# Patient Record
Sex: FEMALE | Race: WHITE | NOT HISPANIC OR LATINO | Employment: OTHER | URBAN - NONMETROPOLITAN AREA
[De-identification: names, ages, dates, MRNs, and addresses within clinical notes are randomized per-mention and may not be internally consistent; named-entity substitution may affect disease eponyms.]

---

## 2017-02-07 PROCEDURE — 87070 CULTURE OTHR SPECIMN AEROBIC: CPT | Performed by: FAMILY MEDICINE

## 2017-12-22 ENCOUNTER — APPOINTMENT (OUTPATIENT)
Dept: GENERAL RADIOLOGY | Facility: HOSPITAL | Age: 32
End: 2017-12-22

## 2017-12-22 PROCEDURE — 71020 HC CHEST PA AND LATERAL: CPT

## 2018-01-18 ENCOUNTER — APPOINTMENT (OUTPATIENT)
Dept: GENERAL RADIOLOGY | Facility: HOSPITAL | Age: 33
End: 2018-01-18

## 2018-03-12 ENCOUNTER — APPOINTMENT (OUTPATIENT)
Dept: LAB | Facility: HOSPITAL | Age: 33
End: 2018-03-12
Attending: OBSTETRICS & GYNECOLOGY

## 2018-03-12 ENCOUNTER — TRANSCRIBE ORDERS (OUTPATIENT)
Dept: ADMINISTRATIVE | Facility: HOSPITAL | Age: 33
End: 2018-03-12

## 2018-03-12 DIAGNOSIS — E03.9 ACQUIRED HYPOTHYROIDISM: ICD-10-CM

## 2018-03-12 DIAGNOSIS — Z13.1 SCREENING FOR DIABETES MELLITUS: ICD-10-CM

## 2018-03-12 DIAGNOSIS — Z13.220 SCREENING FOR LIPOID DISORDERS: ICD-10-CM

## 2018-03-12 DIAGNOSIS — Z13.0 SCREENING FOR IRON DEFICIENCY ANEMIA: Primary | ICD-10-CM

## 2018-03-12 LAB
ALBUMIN SERPL-MCNC: 4.4 G/DL (ref 3.5–5)
ALBUMIN/GLOB SERPL: 1.3 G/DL (ref 1.1–2.5)
ALP SERPL-CCNC: 56 U/L (ref 24–120)
ALT SERPL W P-5'-P-CCNC: 26 U/L (ref 0–54)
ANION GAP SERPL CALCULATED.3IONS-SCNC: 12 MMOL/L (ref 4–13)
ARTICHOKE IGE QN: 88 MG/DL (ref 0–99)
AST SERPL-CCNC: 24 U/L (ref 7–45)
BILIRUB SERPL-MCNC: 0.3 MG/DL (ref 0.1–1)
BUN BLD-MCNC: 8 MG/DL (ref 5–21)
BUN/CREAT SERPL: 11 (ref 7–25)
CALCIUM SPEC-SCNC: 9.6 MG/DL (ref 8.4–10.4)
CHLORIDE SERPL-SCNC: 100 MMOL/L (ref 98–110)
CHOLEST SERPL-MCNC: 188 MG/DL (ref 130–200)
CO2 SERPL-SCNC: 31 MMOL/L (ref 24–31)
CREAT BLD-MCNC: 0.73 MG/DL (ref 0.5–1.4)
GFR SERPL CREATININE-BSD FRML MDRD: 91 ML/MIN/1.73
GLOBULIN UR ELPH-MCNC: 3.4 GM/DL
GLUCOSE BLD-MCNC: 99 MG/DL (ref 70–100)
HDLC SERPL-MCNC: 73 MG/DL
HGB BLD-MCNC: 13.7 G/DL (ref 12–16)
LDLC/HDLC SERPL: 1.33 {RATIO}
POTASSIUM BLD-SCNC: 4.3 MMOL/L (ref 3.5–5.3)
PROT SERPL-MCNC: 7.8 G/DL (ref 6.3–8.7)
SODIUM BLD-SCNC: 143 MMOL/L (ref 135–145)
T4 FREE SERPL-MCNC: 1.07 NG/DL (ref 0.78–2.19)
TRIGL SERPL-MCNC: 90 MG/DL (ref 0–149)
TSH SERPL DL<=0.05 MIU/L-ACNC: 2.11 MIU/ML (ref 0.47–4.68)

## 2018-03-12 PROCEDURE — 85018 HEMOGLOBIN: CPT | Performed by: OBSTETRICS & GYNECOLOGY

## 2018-03-12 PROCEDURE — 80061 LIPID PANEL: CPT | Performed by: OBSTETRICS & GYNECOLOGY

## 2018-03-12 PROCEDURE — 36415 COLL VENOUS BLD VENIPUNCTURE: CPT

## 2018-03-12 PROCEDURE — 84439 ASSAY OF FREE THYROXINE: CPT | Performed by: OBSTETRICS & GYNECOLOGY

## 2018-03-12 PROCEDURE — 80053 COMPREHEN METABOLIC PANEL: CPT | Performed by: OBSTETRICS & GYNECOLOGY

## 2018-03-12 PROCEDURE — 84443 ASSAY THYROID STIM HORMONE: CPT | Performed by: OBSTETRICS & GYNECOLOGY

## 2018-04-06 ENCOUNTER — OFFICE VISIT (OUTPATIENT)
Dept: INTERNAL MEDICINE | Facility: CLINIC | Age: 33
End: 2018-04-06

## 2018-04-06 VITALS
HEIGHT: 60 IN | BODY MASS INDEX: 30.9 KG/M2 | DIASTOLIC BLOOD PRESSURE: 72 MMHG | OXYGEN SATURATION: 98 % | HEART RATE: 67 BPM | SYSTOLIC BLOOD PRESSURE: 111 MMHG | RESPIRATION RATE: 16 BRPM | WEIGHT: 157.4 LBS

## 2018-04-06 DIAGNOSIS — J45.20 MILD INTERMITTENT ASTHMA WITHOUT COMPLICATION: ICD-10-CM

## 2018-04-06 DIAGNOSIS — J45.909 ASTHMA DUE TO SEASONAL ALLERGIES: ICD-10-CM

## 2018-04-06 DIAGNOSIS — Z00.00 ENCOUNTER FOR PREVENTIVE HEALTH EXAMINATION: Primary | ICD-10-CM

## 2018-04-06 DIAGNOSIS — E66.09 CLASS 1 OBESITY DUE TO EXCESS CALORIES WITHOUT SERIOUS COMORBIDITY WITH BODY MASS INDEX (BMI) OF 30.0 TO 30.9 IN ADULT: ICD-10-CM

## 2018-04-06 PROBLEM — E66.811 CLASS 1 OBESITY DUE TO EXCESS CALORIES WITHOUT SERIOUS COMORBIDITY WITH BODY MASS INDEX (BMI) OF 30.0 TO 30.9 IN ADULT: Status: ACTIVE | Noted: 2018-04-06

## 2018-04-06 PROCEDURE — 99385 PREV VISIT NEW AGE 18-39: CPT | Performed by: INTERNAL MEDICINE

## 2018-04-06 RX ORDER — FLUTICASONE PROPIONATE 50 MCG
2 SPRAY, SUSPENSION (ML) NASAL DAILY
Qty: 1 BOTTLE | Refills: 3 | Status: SHIPPED | OUTPATIENT
Start: 2018-04-06 | End: 2019-07-13

## 2018-04-06 NOTE — PROGRESS NOTES
CC: Establish care for preventive health    History:  Kristen Christie is a 33 y.o. female who presents today for evaluation of the above problems.  She notes she has been doing well without any acute illness.  She has had increased allergies since returning from living in a [facility].  She has tried to watch her weight and is making lifestyle modifications to this end.  She uses albuterol as needed for asthma.    ROS:  Review of Systems   Constitutional: Negative for chills and fever.   HENT: Positive for congestion. Negative for sore throat.    Eyes: Negative for visual disturbance.   Respiratory: Negative for cough and shortness of breath.    Cardiovascular: Negative for chest pain and palpitations.   Gastrointestinal: Negative for abdominal pain, constipation and nausea.   Endocrine: Negative for cold intolerance and heat intolerance.   Genitourinary: Negative for difficulty urinating and frequency.   Musculoskeletal: Negative for arthralgias and back pain.   Skin: Negative for rash.   Neurological: Negative for dizziness and headaches.   Psychiatric/Behavioral: Negative for dysphoric mood. The patient is not nervous/anxious.        No Known Allergies  Past Medical History:   Diagnosis Date   • Arthritis    • Asthma      Past Surgical History:   Procedure Laterality Date   • LEEP  2015   • TONSILLECTOMY  2004     Family History   Problem Relation Age of Onset   • Diabetes Father    • Hypertension Father    • Diabetes Paternal Grandmother    • Hypertension Paternal Grandmother    • Stroke Paternal Grandmother    • Stroke Mother    • Hypertension Sister    • Cancer Maternal Grandmother    • Stroke Maternal Grandmother    • Stroke Maternal Grandfather       reports that she quit smoking about 6 years ago. She quit after 10.00 years of use. She has never used smokeless tobacco. She reports that she drinks alcohol. She reports that she does not use drugs.      Current Outpatient Prescriptions:   •  albuterol (PROVENTIL  "HFA;VENTOLIN HFA) 108 (90 Base) MCG/ACT inhaler, Inhale 2 puffs Every 4 (Four) Hours As Needed for Wheezing., Disp: 1 inhaler, Rfl: 0  •  fluticasone (FLONASE) 50 MCG/ACT nasal spray, 2 sprays into each nostril Daily., Disp: 1 bottle, Rfl: 3    OBJECTIVE:  /72 (BP Location: Left arm, Patient Position: Sitting, Cuff Size: Adult)   Pulse 67   Resp 16   Ht 152.4 cm (60\")   Wt 71.4 kg (157 lb 6.4 oz)   SpO2 98%   BMI 30.74 kg/m²    Physical Exam   Constitutional: She is oriented to person, place, and time. She appears well-developed and well-nourished. No distress.   HENT:   Head: Normocephalic and atraumatic.   Right Ear: External ear normal.   Left Ear: External ear normal.   Nose: Nose normal.   Mouth/Throat: Oropharynx is clear and moist. No oropharyngeal exudate.   Eyes: EOM are normal. No scleral icterus.   Neck: Normal range of motion. Neck supple.   Cardiovascular: Normal rate, regular rhythm and normal heart sounds.    No murmur heard.  Pulmonary/Chest: Effort normal and breath sounds normal. No accessory muscle usage. No respiratory distress. She has no wheezes.   Abdominal: Soft. Bowel sounds are normal. She exhibits no distension. There is no tenderness.   Musculoskeletal: Normal range of motion.   Lymphadenopathy:     She has no cervical adenopathy.   Neurological: She is alert and oriented to person, place, and time. Coordination and gait normal.   Skin: Skin is warm and dry. No cyanosis. Nails show no clubbing.   No jaundice   Psychiatric: She has a normal mood and affect. Her mood appears not anxious. She does not exhibit a depressed mood.       Assessment/Plan    Diagnoses and all orders for this visit:    Encounter for preventive health examination  Immunizations:      - Tetanus: Unknown or >10 years ago. Recommend to have at pharmacy or on injury.      - Influenza: Due, but refused.      - Pneumovax: Once after age 65      - Prevnar: Once after age 65      - Zostavax: Once after age " 60  Colonoscopy: Due at 48  Mammogram: Due at 48.  PAP: was done on approximately 2018 and the result was: normal PAP. Seek records.  DEXA: DEXA scan at 63  Blood pressure well controlled.  Weight as below.    Mild intermittent asthma without complication  Asthma due to seasonal allergies  -     fluticasone (FLONASE) 50 MCG/ACT nasal spray; 2 sprays into each nostril Daily.  Stable on albuterol for her mild intermittent disease.    Class 1 obesity due to excess calories without serious comorbidity with BMI of 30.0-30.9  Recommended attention to portion control and being careful about the types and timing of meals for the purpose of weight management.    An After Visit Summary was printed and given to the patient at discharge.  Return in about 1 year (around 4/6/2019) for Annual physical; cancel 4/20.         Dany Jones D.O. 4/6/2018

## 2018-12-25 ENCOUNTER — NURSE TRIAGE (OUTPATIENT)
Dept: CALL CENTER | Facility: HOSPITAL | Age: 33
End: 2018-12-25

## 2018-12-25 NOTE — TELEPHONE ENCOUNTER
Mother is caller. Daughter has been on antibiotics for sinus infection since 12/18 Clarithromycin 500 mg every 12 hours.  She was evaluated at the  clinic and was given a steriod shot while there. Caller is wanting to know if there is anything that can be done at home for the sinus pain.  Any nasal spray or anything like that. Mother said right cheek is swollen, just on her cheek, no redness, pain is worse when cheek is pressed. Greenish nasal discharge.  She does not seem any better, in fact she is worse. Care advice per guideline, caller says she will not go to the ER, she will wait until the am and return to the  clinic.  Dr. Jones is listed as PCP but caller says she goes to the  clinic. She has not seen Dr. Jones in quite some time.  Advised compresses to cheek and OTC pain medications.  She has been taking Tylenol every 4 hours for the pain.    Reason for Disposition  • [1] Redness or swelling on the cheek, forehead or around the eye AND [2] new since starting antibiotics    Additional Information  • Negative: Severe difficulty breathing (e.g., struggling for each breath, speaks in single words)  • Negative: Sounds like a life-threatening emergency to the triager  • [1] Sinus infection AND [2] taking an antibiotic AND [3] symptoms continue  • Negative: Severe difficulty breathing (e.g., struggling for each breath, speaks in single words)  • Negative: Sounds like a life-threatening emergency to the triager  • Negative: [1] Difficulty breathing AND [2] not from stuffy nose (e.g., not relieved by cleaning out the nose)  • Negative: [1] SEVERE headache AND [2] fever  • Negative: [1] Taking antibiotic > 24 hours AND [2] fever > 103 F (39.4 C)  • Negative: [1] Redness or swelling on the cheek, forehead or around the eye AND [2] fever  • Negative: Patient sounds very sick or weak to the triager  • Negative: [1] SEVERE sinus pain AND [2] not improved 2 hours after pain medicine    Answer Assessment - Initial  "Assessment Questions  1. LOCATION: \"Where does it hurt?\"       Under eyes on cheek bone right side  2. ONSET: \"When did the sinus pain start?\"  (e.g., hours, days)       1 week    Worse yesterday on right side  3. SEVERITY: \"How bad is the pain?\"   (Scale 1-10; mild, moderate or severe)    - MILD (1-3): doesn't interfere with normal activities     - MODERATE (4-7): interferes with normal activities (e.g., work or school) or awakens from sleep    - SEVERE (8-10): excruciating pain and patient unable to do any normal activities         Moderate   4. RECURRENT SYMPTOM: \"Have you ever had sinus problems before?\" If so, ask: \"When was the last time?\" and \"What happened that time?\"       On antibiotics  5. NASAL CONGESTION: \"Is the nose blocked?\" If so, ask, \"Can you open it or must you breathe through the mouth?\"      yes  6. NASAL DISCHARGE: \"Do you have discharge from your nose?\" If so ask, \"What color?\"      Greenish drainage  7. FEVER: \"Do you have a fever?\" If so, ask: \"What is it, how was it measured, and when did it start?\"       No fever Taking Tylenol  8. OTHER SYMPTOMS: \"Do you have any other symptoms?\" (e.g., sore throat, cough, earache, difficulty breathing)      Pain back to right ear, cough  9. PREGNANCY: \"Is there any chance you are pregnant?\" \"When was your last menstrual period?\"      no    Answer Assessment - Initial Assessment Questions  1. ANTIBIOTIC: \"What antibiotic are you receiving?\" \"How many times per day?\"      Doesn't know  2. ONSET: \"When was the antibiotic started?\"      About 1 week ago  3. PAIN: \"How bad is the sinus pain?\"   (Scale 1-10; mild, moderate or severe)    - MILD (1-3): doesn't interfere with normal activities     - MODERATE (4-7): interferes with normal activities (e.g., work or school) or awakens from sleep    - SEVERE (8-10): excruciating pain and patient unable to do any normal activities         *No Answer*  4. FEVER: \"Do you have a fever?\" If so, ask: \"What is it, how was " "it measured, and when did it start?\"       No fever but taking Tylenol  5. SYMPTOMS: \"Are there any other symptoms you're concerned about?\" If so, ask: \"When did it start?\"      Coughing and right ear pain  6. PREGNANCY: \"Is there any chance you are pregnant?\" \"When was your last menstrual period?\"      no    Protocols used: SINUS INFECTION ON ANTIBIOTIC FOLLOW-UP CALL-ADULT-, SINUS PAIN OR CONGESTION-ADULT-      "

## 2019-02-22 ENCOUNTER — TELEPHONE (OUTPATIENT)
Dept: URGENT CARE | Facility: CLINIC | Age: 34
End: 2019-02-22

## 2019-02-22 DIAGNOSIS — Z20.828 EXPOSURE TO THE FLU: Primary | ICD-10-CM

## 2019-02-22 RX ORDER — OSELTAMIVIR PHOSPHATE 75 MG/1
75 CAPSULE ORAL DAILY
Qty: 10 CAPSULE | Refills: 0 | Status: SHIPPED | OUTPATIENT
Start: 2019-02-22 | End: 2019-07-13

## 2019-07-13 PROCEDURE — 87147 CULTURE TYPE IMMUNOLOGIC: CPT | Performed by: FAMILY MEDICINE

## 2019-07-13 PROCEDURE — 87185 SC STD ENZYME DETCJ PER NZM: CPT | Performed by: FAMILY MEDICINE

## 2019-07-13 PROCEDURE — 87186 SC STD MICRODIL/AGAR DIL: CPT | Performed by: FAMILY MEDICINE

## 2019-07-13 PROCEDURE — 87205 SMEAR GRAM STAIN: CPT | Performed by: FAMILY MEDICINE

## 2019-07-13 PROCEDURE — 87070 CULTURE OTHR SPECIMN AEROBIC: CPT | Performed by: FAMILY MEDICINE

## 2019-09-17 ENCOUNTER — TELEPHONE (OUTPATIENT)
Dept: OBSTETRICS AND GYNECOLOGY | Facility: CLINIC | Age: 34
End: 2019-09-17

## 2019-09-17 NOTE — TELEPHONE ENCOUNTER
Pt calls stating she had pap done 8/21 at [facility]. She will have it faxed to office.  Has office appt 9/30

## 2019-09-30 ENCOUNTER — OFFICE VISIT (OUTPATIENT)
Dept: OBSTETRICS AND GYNECOLOGY | Facility: CLINIC | Age: 34
End: 2019-09-30

## 2019-09-30 ENCOUNTER — TELEPHONE (OUTPATIENT)
Dept: OBSTETRICS AND GYNECOLOGY | Facility: CLINIC | Age: 34
End: 2019-09-30

## 2019-09-30 ENCOUNTER — PROCEDURE VISIT (OUTPATIENT)
Dept: OBSTETRICS AND GYNECOLOGY | Facility: CLINIC | Age: 34
End: 2019-09-30

## 2019-09-30 VITALS
SYSTOLIC BLOOD PRESSURE: 116 MMHG | BODY MASS INDEX: 31.41 KG/M2 | DIASTOLIC BLOOD PRESSURE: 68 MMHG | WEIGHT: 160 LBS | HEIGHT: 60 IN

## 2019-09-30 DIAGNOSIS — Z80.3 FAMILY HISTORY OF BREAST CANCER: ICD-10-CM

## 2019-09-30 DIAGNOSIS — Z12.31 ENCOUNTER FOR SCREENING MAMMOGRAM FOR BREAST CANCER: ICD-10-CM

## 2019-09-30 DIAGNOSIS — Z13.21 ENCOUNTER FOR VITAMIN DEFICIENCY SCREENING: ICD-10-CM

## 2019-09-30 DIAGNOSIS — N92.1 MENORRHAGIA WITH IRREGULAR CYCLE: ICD-10-CM

## 2019-09-30 DIAGNOSIS — R87.612 LGSIL ON PAP SMEAR OF CERVIX: ICD-10-CM

## 2019-09-30 DIAGNOSIS — R10.2 PELVIC PAIN: Primary | ICD-10-CM

## 2019-09-30 DIAGNOSIS — E28.2 PCOS (POLYCYSTIC OVARIAN SYNDROME): ICD-10-CM

## 2019-09-30 PROCEDURE — 76830 TRANSVAGINAL US NON-OB: CPT | Performed by: OBSTETRICS & GYNECOLOGY

## 2019-09-30 PROCEDURE — 99213 OFFICE O/P EST LOW 20 MIN: CPT | Performed by: NURSE PRACTITIONER

## 2019-09-30 NOTE — PATIENT INSTRUCTIONS

## 2019-09-30 NOTE — PROGRESS NOTES
"Subjective     Kristen Christie is a 34 y.o. female    Patient is here today as a new patient.  She has previous history of abnormal Pap smears and has had a LEEP.  She had a Pap smear at Dr. Ayers's office in August that was low-grade RAUL.      Menstrual Problem   This is a new problem. The current episode started more than 1 month ago. The problem occurs intermittently. The problem has been waxing and waning. Pertinent negatives include no abdominal pain, anorexia, arthralgias, change in bowel habit, chest pain, chills, congestion, coughing, diaphoresis, fatigue, fever, headaches, joint swelling, myalgias, nausea, neck pain, numbness, rash, sore throat, swollen glands, urinary symptoms, vertigo, visual change, vomiting or weakness. Nothing aggravates the symptoms. She has tried nothing for the symptoms.         /68   Ht 152.4 cm (60\")   Wt 72.6 kg (160 lb)   LMP 09/29/2019 (Exact Date)   Breastfeeding? No   BMI 31.25 kg/m²     Outpatient Encounter Medications as of 9/30/2019   Medication Sig Dispense Refill   • phentermine 15 MG capsule Take 15 mg by mouth Every Morning.       No facility-administered encounter medications on file as of 9/30/2019.        Surgical History  Past Surgical History:   Procedure Laterality Date   • LEEP  2015   • TONSILLECTOMY  2004       Family History  Family History   Problem Relation Age of Onset   • Diabetes Father    • Hypertension Father    • Diabetes Paternal Grandmother    • Hypertension Paternal Grandmother    • Stroke Paternal Grandmother    • Stroke Mother    • Hypertension Sister    • Stroke Maternal Grandmother    • Breast cancer Maternal Grandmother 60   • Stroke Maternal Grandfather    • Hypertension Brother    • Ovarian cancer Maternal Great-Grandmother         age unknown   • Uterine cancer Neg Hx    • Colon cancer Neg Hx    • Melanoma Neg Hx    • Prostate cancer Neg Hx        The following portions of the patient's history were reviewed and updated as " appropriate: allergies, current medications, past family history, past medical history, past social history, past surgical history and problem list.    Review of Systems   Constitutional: Negative for activity change, appetite change, chills, diaphoresis, fatigue, fever, unexpected weight gain and unexpected weight loss.   HENT: Negative for congestion, dental problem, drooling, ear discharge, ear pain, facial swelling, hearing loss, mouth sores, nosebleeds, postnasal drip, rhinorrhea, sinus pressure, sneezing, sore throat, swollen glands, tinnitus, trouble swallowing and voice change.    Eyes: Negative for blurred vision, double vision, photophobia, pain, discharge, redness, itching and visual disturbance.   Respiratory: Negative for apnea, cough, choking, chest tightness, shortness of breath, wheezing and stridor.    Cardiovascular: Negative for chest pain, palpitations and leg swelling.   Gastrointestinal: Negative for abdominal distention, abdominal pain, anal bleeding, anorexia, blood in stool, change in bowel habit, constipation, diarrhea, nausea, rectal pain, vomiting, GERD and indigestion.   Endocrine: Negative for cold intolerance, heat intolerance, polydipsia, polyphagia and polyuria.   Genitourinary: Positive for menstrual problem. Negative for amenorrhea, breast discharge, breast lump, breast pain, decreased libido, decreased urine volume, difficulty urinating, dyspareunia, dysuria, flank pain, frequency, genital sores, hematuria, pelvic pain, pelvic pressure, urgency, urinary incontinence, vaginal bleeding, vaginal discharge and vaginal pain.   Musculoskeletal: Negative for arthralgias, back pain, gait problem, joint swelling, myalgias, neck pain, neck stiffness and bursitis.   Skin: Negative for color change, dry skin and rash.   Allergic/Immunologic: Negative for environmental allergies, food allergies and immunocompromised state.   Neurological: Negative for dizziness, vertigo, tremors, seizures,  syncope, facial asymmetry, speech difficulty, weakness, light-headedness, numbness, headache, memory problem and confusion.   Hematological: Negative for adenopathy. Does not bruise/bleed easily.   Psychiatric/Behavioral: Negative for agitation, behavioral problems, decreased concentration, dysphoric mood, hallucinations, self-injury, sleep disturbance, suicidal ideas, negative for hyperactivity, depressed mood and stress. The patient is not nervous/anxious.        Objective   Physical Exam   Constitutional: She is oriented to person, place, and time. She appears well-developed and well-nourished.   HENT:   Head: Normocephalic and atraumatic.   Eyes: Conjunctivae are normal. Right eye exhibits no discharge. Left eye exhibits no discharge.   Neck: Normal range of motion. Neck supple. No thyromegaly present.   Cardiovascular: Normal rate, regular rhythm and normal heart sounds.   Pulmonary/Chest: Effort normal and breath sounds normal.   Neurological: She is alert and oriented to person, place, and time.   Skin: Skin is warm and dry.   Psychiatric: She has a normal mood and affect. Her behavior is normal. Judgment and thought content normal.   Nursing note and vitals reviewed.      Assessment/Plan   Kristen was seen today for pelvic pain.    Diagnoses and all orders for this visit:    LGSIL on Pap smear of cervix  Comments:  Patient had a Pap with low-grade RAUL at Dr. Colon's office in August.  She has had previous LEEP.  She will return for a colpo with MD.    Encounter for screening mammogram for breast cancer  Comments:  Will have baseline mammogram at [facility].    Family history of breast cancer  Pt has a family history of breast cancer. We discussed Genetic screening that can be done to see if she carries the Gene for Breast, Ovarian, Colon, Melanoma, Uterine and Pancreatic Cancers. We discussed if positive she will have free Genetic counseling through [facility]. We also discussed if she does have  the positive gene she can have more testing yearly, and even surgery if desired.]    Menorrhagia with irregular cycle  Comments:  Patient is having heavy periods with clotting and cramping.  Pelvic ultrasound done today was essentially normal.  We will draw lab work.  Orders:  -     CBC & Differential  -     Comprehensive Metabolic Panel  -     Lipid Panel With LDL / HDL Ratio  -     TSH  -     T3, Uptake  -     T4, Free  -     Hemoglobin A1c  -     UA / M With / Rflx Culture([facility] ONLY) - Urine, Clean Catch    Encounter for vitamin deficiency screening  Comments:  Will have lab work drawn to check for vitamin deficiency.  Orders:  -     Vitamin D 25 Hydroxy    PCOS (polycystic ovarian syndrome)  Comments:  Patient was told in the past she had PCOS and would like to have those labs drawn again.  Orders:  -     Insulin, Total  -     Follicle Stimulating Hormone  -     Luteinizing Hormone  -     Estradiol  -     Progesterone  -     Testosterone  -     DHEA-Sulfate  -     Cortisol         Patient's Body mass index is 31.25 kg/m². BMI is above normal parameters. Recommendations include: educational material, exercise counseling and nutrition counseling.      Gloria Freeman, APRN  9/30/2019

## 2019-09-30 NOTE — TELEPHONE ENCOUNTER
Pt calls stating she has appt today however she just started her period and needs to reschedule.  Transferred to scheduling.

## 2019-10-02 LAB
25(OH)D3+25(OH)D2 SERPL-MCNC: 37.7 NG/ML (ref 30–100)
ALBUMIN SERPL-MCNC: 4.3 G/DL (ref 3.5–5.2)
ALBUMIN/GLOB SERPL: 1.7 G/DL
ALP SERPL-CCNC: 47 U/L (ref 39–117)
ALT SERPL-CCNC: 11 U/L (ref 1–33)
AST SERPL-CCNC: 16 U/L (ref 1–32)
BASOPHILS # BLD AUTO: 0.04 10*3/MM3 (ref 0–0.2)
BASOPHILS NFR BLD AUTO: 0.9 % (ref 0–1.5)
BILIRUB SERPL-MCNC: 0.4 MG/DL (ref 0.2–1.2)
BUN SERPL-MCNC: 9 MG/DL (ref 6–20)
BUN/CREAT SERPL: 11.4 (ref 7–25)
CALCIUM SERPL-MCNC: 9 MG/DL (ref 8.6–10.5)
CHLORIDE SERPL-SCNC: 102 MMOL/L (ref 98–107)
CHOLEST SERPL-MCNC: 176 MG/DL (ref 0–200)
CO2 SERPL-SCNC: 24.1 MMOL/L (ref 22–29)
CORTIS SERPL-MCNC: 9 UG/DL
CREAT SERPL-MCNC: 0.79 MG/DL (ref 0.57–1)
DHEA-S SERPL-MCNC: 40 UG/DL (ref 57.3–279.2)
EOSINOPHIL # BLD AUTO: 0.09 10*3/MM3 (ref 0–0.4)
EOSINOPHIL NFR BLD AUTO: 2 % (ref 0.3–6.2)
ERYTHROCYTE [DISTWIDTH] IN BLOOD BY AUTOMATED COUNT: 12.8 % (ref 12.3–15.4)
ESTRADIOL SERPL-MCNC: 67.3 PG/ML
FSH SERPL-ACNC: 7.7 MIU/ML
GLOBULIN SER CALC-MCNC: 2.5 GM/DL
GLUCOSE SERPL-MCNC: 91 MG/DL (ref 65–99)
HBA1C MFR BLD: 5 % (ref 4.8–5.6)
HCT VFR BLD AUTO: 38.2 % (ref 34–46.6)
HDLC SERPL-MCNC: 75 MG/DL (ref 40–60)
HGB BLD-MCNC: 12.6 G/DL (ref 12–15.9)
IMM GRANULOCYTES # BLD AUTO: 0.02 10*3/MM3 (ref 0–0.05)
IMM GRANULOCYTES NFR BLD AUTO: 0.4 % (ref 0–0.5)
INSULIN SERPL-ACNC: 9.3 UIU/ML (ref 2.6–24.9)
LDLC SERPL CALC-MCNC: 81 MG/DL (ref 0–100)
LDLC/HDLC SERPL: 1.07 {RATIO}
LH SERPL-ACNC: 8.6 MIU/ML
LYMPHOCYTES # BLD AUTO: 1.14 10*3/MM3 (ref 0.7–3.1)
LYMPHOCYTES NFR BLD AUTO: 24.7 % (ref 19.6–45.3)
MCH RBC QN AUTO: 30 PG (ref 26.6–33)
MCHC RBC AUTO-ENTMCNC: 33 G/DL (ref 31.5–35.7)
MCV RBC AUTO: 91 FL (ref 79–97)
MONOCYTES # BLD AUTO: 0.43 10*3/MM3 (ref 0.1–0.9)
MONOCYTES NFR BLD AUTO: 9.3 % (ref 5–12)
NEUTROPHILS # BLD AUTO: 2.89 10*3/MM3 (ref 1.7–7)
NEUTROPHILS NFR BLD AUTO: 62.7 % (ref 42.7–76)
NRBC BLD AUTO-RTO: 0 /100 WBC (ref 0–0.2)
PLATELET # BLD AUTO: 322 10*3/MM3 (ref 140–450)
POTASSIUM SERPL-SCNC: 4 MMOL/L (ref 3.5–5.2)
PROGEST SERPL-MCNC: 0.3 NG/ML
PROT SERPL-MCNC: 6.8 G/DL (ref 6–8.5)
RBC # BLD AUTO: 4.2 10*6/MM3 (ref 3.77–5.28)
SODIUM SERPL-SCNC: 140 MMOL/L (ref 136–145)
T3RU NFR SERPL: 26 % (ref 24–39)
T4 FREE SERPL-MCNC: 1.18 NG/DL (ref 0.93–1.7)
TESTOST SERPL-MCNC: 7 NG/DL (ref 8–48)
TRIGL SERPL-MCNC: 102 MG/DL (ref 0–150)
TSH SERPL DL<=0.005 MIU/L-ACNC: 2.55 UIU/ML (ref 0.27–4.2)
VLDLC SERPL CALC-MCNC: 20.4 MG/DL
WBC # BLD AUTO: 4.61 10*3/MM3 (ref 3.4–10.8)

## 2019-10-16 ENCOUNTER — OFFICE VISIT (OUTPATIENT)
Dept: OBSTETRICS AND GYNECOLOGY | Facility: CLINIC | Age: 34
End: 2019-10-16

## 2019-10-16 VITALS
DIASTOLIC BLOOD PRESSURE: 70 MMHG | BODY MASS INDEX: 31.02 KG/M2 | SYSTOLIC BLOOD PRESSURE: 118 MMHG | WEIGHT: 158 LBS | HEIGHT: 60 IN

## 2019-10-16 DIAGNOSIS — R87.612 LGSIL ON PAP SMEAR OF CERVIX: Primary | ICD-10-CM

## 2019-10-16 LAB
B-HCG UR QL: NEGATIVE
INTERNAL NEGATIVE CONTROL: NEGATIVE
INTERNAL POSITIVE CONTROL: POSITIVE
Lab: NORMAL

## 2019-10-16 PROCEDURE — 57455 BIOPSY OF CERVIX W/SCOPE: CPT | Performed by: NURSE PRACTITIONER

## 2019-10-16 PROCEDURE — 81025 URINE PREGNANCY TEST: CPT | Performed by: NURSE PRACTITIONER

## 2019-10-16 PROCEDURE — 88305 TISSUE EXAM BY PATHOLOGIST: CPT | Performed by: NURSE PRACTITIONER

## 2019-10-18 LAB
CYTO UR: NORMAL
LAB AP CASE REPORT: NORMAL
LAB AP CLINICAL INFORMATION: NORMAL
PATH REPORT.FINAL DX SPEC: NORMAL
PATH REPORT.GROSS SPEC: NORMAL

## 2019-10-27 NOTE — PROGRESS NOTES
Colposcopy Procedure Note    Indications: Pap smear 2 months ago showed: low-grade squamous intraepithelial neoplasia (LGSIL - encompassing HPV,mild dysplasia,OVIDIO I). The prior pap showed unknown.  Prior cervical/vaginal disease: unknown r/t patient transfer with no prior records of yet. Prior cervical treatment: unknown.    Procedure Details   The risks and benefits of the procedure and Verbal informed consent obtained.    Speculum placed in vagina and excellent visualization of cervix achieved, cervix swabbed x 3 with acetic acid solution.    Findings:  Cervix: visible lesion(s) at 2 o'clock; SCJ visualized - lesion at 2 o'clock.  Vaginal inspection: vaginal colposcopy not performed.  Vulvar colposcopy: vulvar colposcopy not performed.    Specimens: 1    Complications: none.  Patient tolerated the procedure well without complications.    Plan:  Specimens labelled and sent to Pathology.  Will base further treatment on Pathology findings.

## 2019-11-10 PROCEDURE — 87186 SC STD MICRODIL/AGAR DIL: CPT | Performed by: FAMILY MEDICINE

## 2019-11-10 PROCEDURE — 87205 SMEAR GRAM STAIN: CPT | Performed by: FAMILY MEDICINE

## 2019-11-10 PROCEDURE — 87147 CULTURE TYPE IMMUNOLOGIC: CPT | Performed by: FAMILY MEDICINE

## 2019-11-10 PROCEDURE — 87070 CULTURE OTHR SPECIMN AEROBIC: CPT | Performed by: FAMILY MEDICINE

## 2020-04-13 PROCEDURE — U0003 INFECTIOUS AGENT DETECTION BY NUCLEIC ACID (DNA OR RNA); SEVERE ACUTE RESPIRATORY SYNDROME CORONAVIRUS 2 (SARS-COV-2) (CORONAVIRUS DISEASE [COVID-19]), AMPLIFIED PROBE TECHNIQUE, MAKING USE OF HIGH THROUGHPUT TECHNOLOGIES AS DESCRIBED BY CMS-2020-01-R: HCPCS | Performed by: NURSE PRACTITIONER

## 2020-04-13 PROCEDURE — 87635 SARS-COV-2 COVID-19 AMP PRB: CPT | Performed by: NURSE PRACTITIONER

## 2020-04-16 ENCOUNTER — TELEPHONE (OUTPATIENT)
Dept: URGENT CARE | Facility: CLINIC | Age: 35
End: 2020-04-16

## 2020-04-16 NOTE — TELEPHONE ENCOUNTER
Contacted patient to inform her that the Covid-19 results were negative. She reports no fever at this time, and that her symptoms have been improving with antibiotics.       COVID-19 Test Result   Telephone Encounter    Patient Name: Kristen Christie   : 1985   MRN: [id number]     SARS-CoV-2, LUIS F   Date Value Ref Range Status   2020 Not Detected Not Detected Final     Comment:     This test was developed and its performance characteristics determined  by [facility]. This test has not been FDA cleared or  approved. This test has been authorized by FDA under an Emergency Use  Authorization (EUA). This test has been validated in accordance with  the FDA's Guidance Document (Policy for Diagnostics Testing in  Laboratories Certified to Perform High Complexity Testing under CLIA  prior to Emergency Use Authorization for Coronavirus Disease-2019  during the Public Health Emergency) issued on 2020.  FDA independent review of this validation is pending. This test is  only authorized for the duration of time the declaration that  circumstances exist justifying the authorization of the emergency use  of in vitro diagnostic tests for detection of SARS-CoV-2 virus and/or  diagnosis of COVID-19 infection under section 564(b)(1) of the Act, 21  U.S.C. 360bbb-3(b)(1), unless the authorization is terminated or  revoked sooner.        Patient was counseled as follows:  • (-) negative COVID-19 test result with or without symptoms   • The test is not perfect, so there is a chance it could be falsely negative or the virus level is too low for detection due to being very early in the infectious process.   • The optimal duration of home isolation is uncertain. The [place] Centers for Disease Control and Prevention (CDC) has issued recommendations on discontinuation of home isolation.   • For this reason, Kristen is strongly encouraged to practice the safest standards in protecting their health and  others given the current pandemic concerns. She is advised to:   o Practice social distancing in the community by staying at least 6 feet away from people   o Encouraged to use face mask while out in public   o Continue to wash their hands frequently with soap and hot water, and cover their mouth while coughing.   • If Kristen is asymptomatic, she should self isolate for a total of 14 days from time of potential contact with Covid-19.   • If Kristen is symptomatic then she may discontinue home isolation when the following criteria are met:   o At least seven days have passed since symptoms first appeared AND   o At least three days (72 hours) have passed since recovery of symptoms (defined as resolution of fever without the use of fever-reducing medications and improvement in respiratory symptoms [e.g., cough, shortness of breath])   • If Kristen has been asymptomatic but then develops non-emergent symptoms such as mild increased shortness of breath, fever, cough, or for other questions, she  was asked to please call their primary care physician’s office or the [place] [employer] at [phone].   · Questions were engaged and answered to the best of my ability. She         expressed verbal understanding of their test results and my advice.    Primary Care Physician verified as being: Dany Jones DO      Electronically signed by REMI Griffith, 04/16/20, 11:55 AM.

## 2020-04-29 ENCOUNTER — OFFICE VISIT (OUTPATIENT)
Dept: INTERNAL MEDICINE | Facility: CLINIC | Age: 35
End: 2020-04-29

## 2020-04-29 VITALS
WEIGHT: 164 LBS | OXYGEN SATURATION: 100 % | TEMPERATURE: 98.3 F | RESPIRATION RATE: 16 BRPM | HEART RATE: 77 BPM | HEIGHT: 61 IN | SYSTOLIC BLOOD PRESSURE: 114 MMHG | DIASTOLIC BLOOD PRESSURE: 82 MMHG | BODY MASS INDEX: 30.96 KG/M2

## 2020-04-29 DIAGNOSIS — R53.82 CHRONIC FATIGUE: ICD-10-CM

## 2020-04-29 DIAGNOSIS — R01.1 NEWLY RECOGNIZED HEART MURMUR: ICD-10-CM

## 2020-04-29 DIAGNOSIS — J45.40 MODERATE PERSISTENT ASTHMA, UNSPECIFIED WHETHER COMPLICATED: Primary | ICD-10-CM

## 2020-04-29 DIAGNOSIS — F41.9 ANXIETY: ICD-10-CM

## 2020-04-29 PROCEDURE — 99214 OFFICE O/P EST MOD 30 MIN: CPT | Performed by: NURSE PRACTITIONER

## 2020-04-29 RX ORDER — BUPROPION HYDROCHLORIDE 150 MG/1
150 TABLET ORAL DAILY
Qty: 30 TABLET | Refills: 3 | Status: SHIPPED | OUTPATIENT
Start: 2020-04-29 | End: 2020-04-29 | Stop reason: SDUPTHER

## 2020-04-29 RX ORDER — PHENTERMINE HYDROCHLORIDE 37.5 MG/1
37.5 TABLET ORAL
COMMUNITY
End: 2020-10-28

## 2020-04-29 RX ORDER — BUPROPION HYDROCHLORIDE 150 MG/1
150 TABLET ORAL DAILY
Qty: 30 TABLET | Refills: 3 | Status: SHIPPED | OUTPATIENT
Start: 2020-04-29 | End: 2020-06-23

## 2020-04-29 NOTE — PROGRESS NOTES
CC: possible murmur, urgent care follow up, anxiety    History:  Kristen Christie is a 35 y.o. female who presents today for follow-up for evaluation of the above:  Patient presents today for f/u of possible murmur heard by urgent care while being seen for bronchitis. She reports her respiratory symptoms have improved though she reports that she continues to feel SOB easily with activity compared to her normal. She reports she was noticing this some before being sick with most recent URI. She denies any chest pain, cough, LOC, or unusual swelling.   She has noticed increased GERD over the past couple months.   She does have history of asthma as a child and uses albuterol rescue inhaler PRN with goo results but has not been on daily inhaled therapy in the past.   Additionally patient reports increase in anxiety and reports she is easy to irritate. 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   She reports she was started on synthroid approx 6 weeks ago via Dr. Ayers.   She does report fatigue but admits she does not get adequate sleep. Going to bed at 10pm and waking at 3 am to work.       ROS:  Review of Systems   Constitutional: Positive for fatigue. Negative for unexpected weight change.   HENT: Negative.    Eyes: Negative.    Respiratory: Positive for shortness of breath.    Cardiovascular: Negative.    Gastrointestinal: Negative for abdominal pain, constipation and diarrhea.   Endocrine: Negative.    Genitourinary: Negative for difficulty urinating, dyspareunia, genital sores, menstrual problem, pelvic pain, vaginal bleeding, vaginal discharge and vaginal pain.   Musculoskeletal: Negative.    Skin: Negative.    Neurological: Negative.    Psychiatric/Behavioral: Positive for agitation. The patient is nervous/anxious.        Ms. Christie  reports that she quit smoking about 8 years ago. She quit after 10.00 years of use. She has never used smokeless tobacco. She reports that she drinks alcohol. She  "reports that she does not use drugs.      Current Outpatient Medications:   •  albuterol sulfate  (90 Base) MCG/ACT inhaler, Inhale 2 puffs Every 6 (Six) Hours As Needed for Wheezing or Shortness of Air (cough)., Disp: 1 inhaler, Rfl: 0  •  fluticasone (FLONASE) 50 MCG/ACT nasal spray, 1 spray by Each Nare route Daily., Disp: 1 bottle, Rfl: 0  •  phentermine (ADIPEX-P) 37.5 MG tablet, Take 37.5 mg by mouth Every Morning Before Breakfast., Disp: , Rfl:   •  buPROPion XL (Wellbutrin XL) 150 MG 24 hr tablet, Take 1 tablet by mouth Daily., Disp: 30 tablet, Rfl: 3      OBJECTIVE:  /82 (BP Location: Left arm, Patient Position: Sitting, Cuff Size: Adult)   Pulse 77   Temp 98.3 °F (36.8 °C) (Oral)   Resp 16   Ht 154.9 cm (61\")   Wt 74.4 kg (164 lb)   SpO2 100%   BMI 30.99 kg/m²    Physical Exam   Constitutional: She is oriented to person, place, and time. She appears well-developed and well-nourished.   HENT:   Head: Normocephalic and atraumatic.   Eyes: Pupils are equal, round, and reactive to light. EOM are normal.   Neck: Normal range of motion. Neck supple.   Cardiovascular: Normal rate and regular rhythm.   Murmur heard.   Systolic murmur is present with a grade of 2/6.  Pulmonary/Chest: Effort normal and breath sounds normal.   Abdominal: Soft. Bowel sounds are normal.   Musculoskeletal: Normal range of motion.   Neurological: She is alert and oriented to person, place, and time.   Skin: Skin is warm and dry.   Psychiatric: She has a normal mood and affect.   Vitals reviewed.      Assessment/Plan    Kristen was seen today for follow-up.    Diagnoses and all orders for this visit:    Moderate persistent asthma, unspecified whether complicated  -     Full Pulmonary Function Test With Bronchodilator; Future  Continue Flonase and albuterol. PFT to determine if need for daily therapy.     Anxiety  -     TSH; Future  -     Discontinue: buPROPion XL (Wellbutrin XL) 150 MG 24 hr tablet; Take 1 tablet by " mouth Daily.  -     buPROPion XL (Wellbutrin XL) 150 MG 24 hr tablet; Take 1 tablet by mouth Daily.  To stop synthroid and recheck TSH. Previous TSH in October 2019 was normal. Will try wellbutrin for anxiety.  I advised that it will take 3-4 weeks to see some effect and 6-8 weeks to see full effect.  If the dose is ineffective or suboptimal, the patient should notify the clinic for a consideration of a dose increase.  The patient was advised that starting this medication could worsen symptoms of SI/HI. We discussed this as an emergency and reason to seek care immediately. The patient expressed understanding.     Newly recognized heart murmur  Could be related to synthroid. Recommend to stop and recheck TSH. Not symptomatic at this time.     Chronic fatigue  Discussed that averaging 5 hours per night of sleep is contributing to her fatigue. Recommend 8 hours though I understand this can be difficult with being a single mother and working two jobs.   Reassurance given.        An After Visit Summary was printed and given to the patient at discharge.  Return in about 3 months (around 7/29/2020). Sooner if problems arise.          Ara KHALIL. 4/29/2020   Electronically Signed

## 2020-05-29 ENCOUNTER — TELEPHONE (OUTPATIENT)
Dept: INTERNAL MEDICINE | Facility: CLINIC | Age: 35
End: 2020-05-29

## 2020-05-29 NOTE — TELEPHONE ENCOUNTER
Patient called in regards to an Echocardiogram that was supposed to be ordered for her.  I don't see where one was ordered.  I only see the PFT.      Please advise.

## 2020-06-03 ENCOUNTER — APPOINTMENT (OUTPATIENT)
Dept: PULMONOLOGY | Facility: HOSPITAL | Age: 35
End: 2020-06-03

## 2020-06-23 ENCOUNTER — OFFICE VISIT (OUTPATIENT)
Dept: INTERNAL MEDICINE | Facility: CLINIC | Age: 35
End: 2020-06-23

## 2020-06-23 ENCOUNTER — LAB (OUTPATIENT)
Dept: LAB | Facility: HOSPITAL | Age: 35
End: 2020-06-23

## 2020-06-23 VITALS
OXYGEN SATURATION: 98 % | DIASTOLIC BLOOD PRESSURE: 88 MMHG | HEIGHT: 61 IN | SYSTOLIC BLOOD PRESSURE: 112 MMHG | WEIGHT: 168 LBS | RESPIRATION RATE: 16 BRPM | BODY MASS INDEX: 31.72 KG/M2 | HEART RATE: 72 BPM

## 2020-06-23 DIAGNOSIS — F32.A ANXIETY AND DEPRESSION: ICD-10-CM

## 2020-06-23 DIAGNOSIS — R07.9 CHEST PAIN ON EXERTION: ICD-10-CM

## 2020-06-23 DIAGNOSIS — F41.9 ANXIETY: ICD-10-CM

## 2020-06-23 DIAGNOSIS — Z00.01 ENCOUNTER FOR ANNUAL GENERAL MEDICAL EXAMINATION WITH ABNORMAL FINDINGS IN ADULT: ICD-10-CM

## 2020-06-23 DIAGNOSIS — Z79.899 LONG-TERM CURRENT USE OF STIMULANT: ICD-10-CM

## 2020-06-23 DIAGNOSIS — Z00.01 ENCOUNTER FOR ANNUAL GENERAL MEDICAL EXAMINATION WITH ABNORMAL FINDINGS IN ADULT: Primary | ICD-10-CM

## 2020-06-23 DIAGNOSIS — F41.9 ANXIETY AND DEPRESSION: ICD-10-CM

## 2020-06-23 DIAGNOSIS — R01.1 NEWLY RECOGNIZED HEART MURMUR: ICD-10-CM

## 2020-06-23 DIAGNOSIS — R06.02 SHORTNESS OF BREATH: ICD-10-CM

## 2020-06-23 DIAGNOSIS — I45.10 INCOMPLETE RIGHT BUNDLE BRANCH BLOCK (RBBB): ICD-10-CM

## 2020-06-23 LAB
HCV AB SER DONR QL: NORMAL
TSH SERPL DL<=0.05 MIU/L-ACNC: 2.62 UIU/ML (ref 0.27–4.2)

## 2020-06-23 PROCEDURE — 93000 ELECTROCARDIOGRAM COMPLETE: CPT | Performed by: NURSE PRACTITIONER

## 2020-06-23 PROCEDURE — 99214 OFFICE O/P EST MOD 30 MIN: CPT | Performed by: NURSE PRACTITIONER

## 2020-06-23 PROCEDURE — 36415 COLL VENOUS BLD VENIPUNCTURE: CPT

## 2020-06-23 PROCEDURE — 84443 ASSAY THYROID STIM HORMONE: CPT | Performed by: NURSE PRACTITIONER

## 2020-06-23 PROCEDURE — 86803 HEPATITIS C AB TEST: CPT | Performed by: NURSE PRACTITIONER

## 2020-06-23 PROCEDURE — 99395 PREV VISIT EST AGE 18-39: CPT | Performed by: NURSE PRACTITIONER

## 2020-06-23 RX ORDER — FLUOXETINE 10 MG/1
10 CAPSULE ORAL DAILY
Qty: 30 CAPSULE | Refills: 3 | Status: SHIPPED | OUTPATIENT
Start: 2020-06-23 | End: 2020-06-30 | Stop reason: SDUPTHER

## 2020-06-23 NOTE — PROGRESS NOTES
Procedure     ECG 12 Lead  Date/Time: 6/23/2020 1:54 PM  Performed by: Ara Gutierrez APRN  Authorized by: Ara Gutierrez APRN   Comparison: not compared with previous ECG   Rhythm: sinus rhythm  Rate: normal  Conduction: incomplete right bundle branch block    Clinical impression: abnormal EKG

## 2020-06-23 NOTE — PATIENT INSTRUCTIONS
Fluoxetine capsules or tablets (Depression/Mood Disorders)  What is this medicine?  FLUOXETINE (floo OX e teen) belongs to a class of drugs known as selective serotonin reuptake inhibitors (SSRIs). It helps to treat mood problems such as depression, obsessive compulsive disorder, and panic attacks. It can also treat certain eating disorders.  This medicine may be used for other purposes; ask your health care provider or pharmacist if you have questions.  COMMON BRAND NAME(S): Prozac  What should I tell my health care provider before I take this medicine?  They need to know if you have any of these conditions:  · bipolar disorder or a family history of bipolar disorder  · bleeding disorders  · glaucoma  · heart disease  · liver disease  · low levels of sodium in the blood  · seizures  · suicidal thoughts, plans, or attempt; a previous suicide attempt by you or a family member  · take MAOIs like Carbex, Eldepryl, Marplan, Nardil, and Parnate  · take medicines that treat or prevent blood clots  · thyroid disease  · an unusual or allergic reaction to fluoxetine, other medicines, foods, dyes, or preservatives  · pregnant or trying to get pregnant  · breast-feeding  How should I use this medicine?  Take this medicine by mouth with a glass of water. Follow the directions on the prescription label. You can take this medicine with or without food. Take your medicine at regular intervals. Do not take it more often than directed. Do not stop taking this medicine suddenly except upon the advice of your doctor. Stopping this medicine too quickly may cause serious side effects or your condition may worsen.  A special MedGuide will be given to you by the pharmacist with each prescription and refill. Be sure to read this information carefully each time.  Talk to your pediatrician regarding the use of this medicine in children. While this drug may be prescribed for children as young as 7 years for selected conditions, precautions do  apply.  Overdosage: If you think you have taken too much of this medicine contact a poison control center or emergency room at once.  NOTE: This medicine is only for you. Do not share this medicine with others.  What if I miss a dose?  If you miss a dose, skip the missed dose and go back to your regular dosing schedule. Do not take double or extra doses.  What may interact with this medicine?  Do not take this medicine with any of the following medications:  · other medicines containing fluoxetine, like Sarafem or Symbyax  · cisapride  · dronedarone  · linezolid  · MAOIs like Carbex, Eldepryl, Marplan, Nardil, and Parnate  · methylene blue (injected into a vein)  · pimozide  · thioridazine  This medicine may also interact with the following medications:  · alcohol  · amphetamines  · aspirin and aspirin-like medicines  · carbamazepine  · certain medicines for depression, anxiety, or psychotic disturbances  · certain medicines for migraine headaches like almotriptan, eletriptan, frovatriptan, naratriptan, rizatriptan, sumatriptan, zolmitriptan  · digoxin  · diuretics  · fentanyl  · flecainide  · furazolidone  · isoniazid  · lithium  · medicines for sleep  · medicines that treat or prevent blood clots like warfarin, enoxaparin, and dalteparin  · NSAIDs, medicines for pain and inflammation, like ibuprofen or naproxen  · other medicines that prolong the QT interval (an abnormal heart rhythm)  · phenytoin  · procarbazine  · propafenone  · rasagiline  · ritonavir  · supplements like Everardo's wort, kava kava, valerian  · tramadol  · tryptophan  · vinblastine  This list may not describe all possible interactions. Give your health care provider a list of all the medicines, herbs, non-prescription drugs, or dietary supplements you use. Also tell them if you smoke, drink alcohol, or use illegal drugs. Some items may interact with your medicine.  What should I watch for while using this medicine?  Tell your doctor if your  symptoms do not get better or if they get worse. Visit your doctor or health care professional for regular checks on your progress. Because it may take several weeks to see the full effects of this medicine, it is important to continue your treatment as prescribed by your doctor.  Patients and their families should watch out for new or worsening thoughts of suicide or depression. Also watch out for sudden changes in feelings such as feeling anxious, agitated, panicky, irritable, hostile, aggressive, impulsive, severely restless, overly excited and hyperactive, or not being able to sleep. If this happens, especially at the beginning of treatment or after a change in dose, call your health care professional.  You may get drowsy or dizzy. Do not drive, use machinery, or do anything that needs mental alertness until you know how this medicine affects you. Do not stand or sit up quickly, especially if you are an older patient. This reduces the risk of dizzy or fainting spells. Alcohol may interfere with the effect of this medicine. Avoid alcoholic drinks.  Your mouth may get dry. Chewing sugarless gum or sucking hard candy, and drinking plenty of water may help. Contact your doctor if the problem does not go away or is severe.  This medicine may affect blood sugar levels. If you have diabetes, check with your doctor or health care professional before you change your diet or the dose of your diabetic medicine.  What side effects may I notice from receiving this medicine?  Side effects that you should report to your doctor or health care professional as soon as possible:  · allergic reactions like skin rash, itching or hives, swelling of the face, lips, or tongue  · anxious  · black, tarry stools  · breathing problems  · changes in vision  · confusion  · elevated mood, decreased need for sleep, racing thoughts, impulsive behavior  · eye pain  · fast, irregular heartbeat  · feeling faint or lightheaded, falls  · feeling  agitated, angry, or irritable  · hallucination, loss of contact with reality  · loss of balance or coordination  · loss of memory  · painful or prolonged erections  · restlessness, pacing, inability to keep still  · seizures  · stiff muscles  · suicidal thoughts or other mood changes  · trouble sleeping  · unusual bleeding or bruising  · unusually weak or tired  · vomiting  Side effects that usually do not require medical attention (report to your doctor or health care professional if they continue or are bothersome):  · change in appetite or weight  · change in sex drive or performance  · diarrhea  · dry mouth  · headache  · increased sweating  · nausea  · tremors  This list may not describe all possible side effects. Call your doctor for medical advice about side effects. You may report side effects to FDA at 2-902-HUO-2849.  Where should I keep my medicine?  Keep out of the reach of children.  Store at room temperature between 15 and 30 degrees C (59 and 86 degrees F). Throw away any unused medicine after the expiration date.  NOTE: This sheet is a summary. It may not cover all possible information. If you have questions about this medicine, talk to your doctor, pharmacist, or health care provider.  © 2020 [employer]/Gold Standard (2019-08-08 11:56:53)

## 2020-06-23 NOTE — PROGRESS NOTES
CC: f/u anxiety and depression    History:  Kristen Christie is a 35 y.o. female who presents today for follow-up for evaluation of the above:  Answers for HPI/ROS submitted by the patient on 6/19/2020   What is the primary reason for your visit?: Other  Please describe your symptoms.: Consult regarding medication and previous requested tests.  Have you had these symptoms before?: Yes  How long have you been having these symptoms?: Greater than 2 weeks  Please list any medications you are currently taking for this condition.: Bupropion  Please describe any probable cause for these symptoms. : concerned Patient presents today for f/u on medication. She reports she feels medication is making symptoms worse with anxiety and depression.   Short tempered on Wellbutrin. She admits she is experiencing high stress due to COVID and work. Reports she is experiencing SOB after intercourse and chest tightness.   Possible panic attacks. Lasting 5-10 minutes.  Chest will hurt for a couple hours after intercourse.   Is taking Phentermine from [facility] but not everyday consistently as this makes her feel anxious.   Has not lost weight while on phentermine. Has not had TSH checked from last OV.            ROS:  Review of Systems   Constitutional: Negative for fatigue and unexpected weight change.   HENT: Negative.    Eyes: Negative.    Respiratory: Positive for shortness of breath.    Cardiovascular: Positive for chest pain.   Gastrointestinal: Negative for abdominal pain, constipation and diarrhea.   Endocrine: Negative.    Genitourinary: Negative for difficulty urinating, dyspareunia, genital sores, menstrual problem, pelvic pain, vaginal bleeding, vaginal discharge and vaginal pain.   Musculoskeletal: Negative.    Skin: Negative.    Neurological: Negative.    Psychiatric/Behavioral: The patient is nervous/anxious.        Ms. Christie  reports that she quit smoking about 8 years ago. She quit after 10.00 years of use.  "She has never used smokeless tobacco. She reports that she drinks alcohol. She reports that she does not use drugs.      Current Outpatient Medications:   •  albuterol sulfate  (90 Base) MCG/ACT inhaler, Inhale 2 puffs Every 6 (Six) Hours As Needed for Wheezing or Shortness of Air (cough)., Disp: 1 inhaler, Rfl: 0  •  phentermine (ADIPEX-P) 37.5 MG tablet, Take 37.5 mg by mouth Every Morning Before Breakfast., Disp: , Rfl:   •  FLUoxetine (PROzac) 10 MG capsule, Take 1 capsule by mouth Daily., Disp: 30 capsule, Rfl: 3      OBJECTIVE:  /88 (BP Location: Left arm, Patient Position: Sitting, Cuff Size: Adult)   Pulse 72   Resp 16   Ht 154.9 cm (61\")   Wt 76.2 kg (168 lb)   SpO2 98%   BMI 31.74 kg/m²    Physical Exam   Constitutional: She is oriented to person, place, and time. She appears well-developed and well-nourished. No distress.   HENT:   Head: Normocephalic and atraumatic.   Eyes: Pupils are equal, round, and reactive to light. EOM are normal.   Neck: Normal range of motion. Neck supple.   Pulmonary/Chest: No respiratory distress.   Abdominal: She exhibits no distension.   Musculoskeletal: Normal range of motion.   Neurological: She is alert and oriented to person, place, and time.   Skin: Skin is warm and dry.   Psychiatric: She has a normal mood and affect.   Vitals reviewed.      Assessment/Plan    Kristen was seen today for annual exam and medication problem.    Diagnoses and all orders for this visit:    Encounter for annual general medical examination with abnormal findings in adult  -     Hepatitis C antibody; Future  Immunizations:      - Tetanus: Unknown or >10 years ago. Recommend to have at pharmacy or on injury.      - Influenza: recommended annually      - Pneumovax:once after age 65      - Prevnar: Once after age 65      - Zostavax: Once after age 60  Colonoscopy: Due at 50  Mammogram: Due at 40.  PAP: due on September  DEXA: DEXA scan at 63    Anxiety and depression  -     " FLUoxetine (PROzac) 10 MG capsule; Take 1 capsule by mouth Daily.  -     ECG 12 Lead  I wean off Wellbutrin taking every other day for a week and start Prozac.  I advised that it will take 3-4 weeks to see some effect and 6-8 weeks to see full effect.  If the dose is ineffective or suboptimal, the patient should notify the clinic for a consideration of a dose increase.  The patient was advised that starting this medication could worsen symptoms of SI/HI. We discussed this as an emergency and reason to seek care immediately. The patient expressed understanding.     Shortness of breath  -     ECG 12 Lead- see procedure note.   -     Adult Transthoracic Echo Complete W/ Cont if Necessary Per Protocol; Future    Newly recognized heart murmur  -     Adult Transthoracic Echo Complete W/ Cont if Necessary Per Protocol; Future    Long-term current use of stimulant  Patient to hold phentermine for 2-3 months while we proceed with additional testing and work up.     Incomplete right bundle branch block (RBBB)  Chest pain on exertion  -     Adult Transthoracic Echo Complete W/ Cont if Necessary Per Protocol; Future         An After Visit Summary was printed and given to the patient at discharge.  Return in about 3 months (around 9/23/2020) for f/u anxiety, chest pain. Sooner if problems arise.          Ara KHALIL. 6/23/2020   Electronically Signed

## 2020-06-30 DIAGNOSIS — F41.9 ANXIETY AND DEPRESSION: ICD-10-CM

## 2020-06-30 DIAGNOSIS — F32.A ANXIETY AND DEPRESSION: Primary | ICD-10-CM

## 2020-06-30 DIAGNOSIS — F32.A ANXIETY AND DEPRESSION: ICD-10-CM

## 2020-06-30 DIAGNOSIS — F41.9 ANXIETY AND DEPRESSION: Primary | ICD-10-CM

## 2020-06-30 RX ORDER — FLUOXETINE 10 MG/1
10 CAPSULE ORAL DAILY
Qty: 30 CAPSULE | Refills: 3 | Status: SHIPPED | OUTPATIENT
Start: 2020-06-30 | End: 2020-10-28 | Stop reason: SDUPTHER

## 2020-06-30 NOTE — TELEPHONE ENCOUNTER
Referral has been placed.   Message prior states prescription send to wrong pharmacy but no medication linked.

## 2020-06-30 NOTE — TELEPHONE ENCOUNTER
Patient informed referral has been placed.  Patient requested the script for Prozac be sent to [facility] by the [facility].  Refill request resent with pharmacy change.

## 2020-07-24 ENCOUNTER — HOSPITAL ENCOUNTER (OUTPATIENT)
Dept: CARDIOLOGY | Facility: HOSPITAL | Age: 35
Discharge: HOME | End: 2020-07-24
Admitting: NURSE PRACTITIONER

## 2020-07-24 VITALS
HEIGHT: 61 IN | SYSTOLIC BLOOD PRESSURE: 112 MMHG | DIASTOLIC BLOOD PRESSURE: 88 MMHG | WEIGHT: 168 LBS | BODY MASS INDEX: 31.72 KG/M2

## 2020-07-24 DIAGNOSIS — R07.9 CHEST PAIN ON EXERTION: ICD-10-CM

## 2020-07-24 DIAGNOSIS — R06.02 SHORTNESS OF BREATH: ICD-10-CM

## 2020-07-24 DIAGNOSIS — R01.1 NEWLY RECOGNIZED HEART MURMUR: ICD-10-CM

## 2020-07-24 PROCEDURE — 93356 MYOCRD STRAIN IMG SPCKL TRCK: CPT | Performed by: INTERNAL MEDICINE

## 2020-07-24 PROCEDURE — 93306 TTE W/DOPPLER COMPLETE: CPT

## 2020-07-24 PROCEDURE — 93356 MYOCRD STRAIN IMG SPCKL TRCK: CPT

## 2020-07-24 PROCEDURE — 93306 TTE W/DOPPLER COMPLETE: CPT | Performed by: INTERNAL MEDICINE

## 2020-07-26 LAB
BH CV ECHO MEAS - AO MAX PG (FULL): 13.3 MMHG
BH CV ECHO MEAS - AO MAX PG: 19.4 MMHG
BH CV ECHO MEAS - AO MEAN PG (FULL): 6 MMHG
BH CV ECHO MEAS - AO MEAN PG: 9 MMHG
BH CV ECHO MEAS - AO ROOT AREA (BSA CORRECTED): 1.7
BH CV ECHO MEAS - AO ROOT AREA: 7.1 CM^2
BH CV ECHO MEAS - AO ROOT DIAM: 3 CM
BH CV ECHO MEAS - AO V2 MAX: 220 CM/SEC
BH CV ECHO MEAS - AO V2 MEAN: 136 CM/SEC
BH CV ECHO MEAS - AO V2 VTI: 43.3 CM
BH CV ECHO MEAS - AVA(I,A): 1.8 CM^2
BH CV ECHO MEAS - AVA(I,D): 1.8 CM^2
BH CV ECHO MEAS - AVA(V,A): 1.6 CM^2
BH CV ECHO MEAS - AVA(V,D): 1.6 CM^2
BH CV ECHO MEAS - BSA(HAYCOCK): 1.8 M^2
BH CV ECHO MEAS - BSA: 1.8 M^2
BH CV ECHO MEAS - BZI_BMI: 31.7 KILOGRAMS/M^2
BH CV ECHO MEAS - BZI_METRIC_HEIGHT: 154.9 CM
BH CV ECHO MEAS - BZI_METRIC_WEIGHT: 76.2 KG
BH CV ECHO MEAS - EDV(CUBED): 104.5 ML
BH CV ECHO MEAS - EDV(MOD-SP4): 73.9 ML
BH CV ECHO MEAS - EDV(TEICH): 102.9 ML
BH CV ECHO MEAS - EF(CUBED): 75.4 %
BH CV ECHO MEAS - EF(MOD-SP4): 67.4 %
BH CV ECHO MEAS - EF(TEICH): 67.3 %
BH CV ECHO MEAS - ESV(CUBED): 25.7 ML
BH CV ECHO MEAS - ESV(MOD-SP4): 24.1 ML
BH CV ECHO MEAS - ESV(TEICH): 33.6 ML
BH CV ECHO MEAS - FS: 37.4 %
BH CV ECHO MEAS - IVS/LVPW: 0.93
BH CV ECHO MEAS - IVSD: 0.63 CM
BH CV ECHO MEAS - LA DIMENSION: 3.1 CM
BH CV ECHO MEAS - LA/AO: 1
BH CV ECHO MEAS - LAT PEAK E' VEL: 5.2 CM/SEC
BH CV ECHO MEAS - LV DIASTOLIC VOL/BSA (35-75): 42.1 ML/M^2
BH CV ECHO MEAS - LV MASS(C)D: 95.8 GRAMS
BH CV ECHO MEAS - LV MASS(C)DI: 54.6 GRAMS/M^2
BH CV ECHO MEAS - LV MAX PG: 6.1 MMHG
BH CV ECHO MEAS - LV MEAN PG: 3 MMHG
BH CV ECHO MEAS - LV SYSTOLIC VOL/BSA (12-30): 13.7 ML/M^2
BH CV ECHO MEAS - LV V1 MAX: 123 CM/SEC
BH CV ECHO MEAS - LV V1 MEAN: 79.2 CM/SEC
BH CV ECHO MEAS - LV V1 VTI: 27 CM
BH CV ECHO MEAS - LVIDD: 4.7 CM
BH CV ECHO MEAS - LVIDS: 3 CM
BH CV ECHO MEAS - LVLD AP4: 7 CM
BH CV ECHO MEAS - LVLS AP4: 5.2 CM
BH CV ECHO MEAS - LVOT AREA (M): 2.8 CM^2
BH CV ECHO MEAS - LVOT AREA: 2.8 CM^2
BH CV ECHO MEAS - LVOT DIAM: 1.9 CM
BH CV ECHO MEAS - LVPWD: 0.68 CM
BH CV ECHO MEAS - MED PEAK E' VEL: 9.8 CM/SEC
BH CV ECHO MEAS - MR MAX PG: 59 MMHG
BH CV ECHO MEAS - MR MAX VEL: 384 CM/SEC
BH CV ECHO MEAS - MV A MAX VEL: 77.1 CM/SEC
BH CV ECHO MEAS - MV DEC SLOPE: 885.5 CM/SEC^2
BH CV ECHO MEAS - MV DEC TIME: 0.11 SEC
BH CV ECHO MEAS - MV E MAX VEL: 129 CM/SEC
BH CV ECHO MEAS - MV E/A: 1.7
BH CV ECHO MEAS - MV P1/2T MAX VEL: 137 CM/SEC
BH CV ECHO MEAS - MV P1/2T: 45.3 MSEC
BH CV ECHO MEAS - MVA P1/2T LCG: 1.6 CM^2
BH CV ECHO MEAS - MVA(P1/2T): 4.9 CM^2
BH CV ECHO MEAS - PA MAX PG: 6.3 MMHG
BH CV ECHO MEAS - PA V2 MAX: 125 CM/SEC
BH CV ECHO MEAS - PI END-D VEL: 132 CM/SEC
BH CV ECHO MEAS - RAP SYSTOLE: 5 MMHG
BH CV ECHO MEAS - RVSP: 30 MMHG
BH CV ECHO MEAS - SI(AO): 174.5 ML/M^2
BH CV ECHO MEAS - SI(CUBED): 44.9 ML/M^2
BH CV ECHO MEAS - SI(LVOT): 43.6 ML/M^2
BH CV ECHO MEAS - SI(MOD-SP4): 28.4 ML/M^2
BH CV ECHO MEAS - SI(TEICH): 39.5 ML/M^2
BH CV ECHO MEAS - SV(AO): 306.1 ML
BH CV ECHO MEAS - SV(CUBED): 78.8 ML
BH CV ECHO MEAS - SV(LVOT): 76.6 ML
BH CV ECHO MEAS - SV(MOD-SP4): 49.8 ML
BH CV ECHO MEAS - SV(TEICH): 69.3 ML
BH CV ECHO MEAS - TR MAX VEL: 250 CM/SEC
BH CV ECHO MEASUREMENTS AVERAGE E/E' RATIO: 17.2
LEFT ATRIUM VOLUME INDEX: 19.2 ML/M2
LV EF 2D ECHO EST: 65 %
MAXIMAL PREDICTED HEART RATE: 183 BPM
STRESS TARGET HR: 156 BPM

## 2020-07-28 PROBLEM — R01.0 INNOCENT HEART MURMUR: Status: ACTIVE | Noted: 2020-07-28

## 2020-10-28 ENCOUNTER — OFFICE VISIT (OUTPATIENT)
Dept: INTERNAL MEDICINE | Facility: CLINIC | Age: 35
End: 2020-10-28

## 2020-10-28 VITALS
HEART RATE: 76 BPM | BODY MASS INDEX: 32.36 KG/M2 | SYSTOLIC BLOOD PRESSURE: 130 MMHG | WEIGHT: 171.4 LBS | RESPIRATION RATE: 16 BRPM | HEIGHT: 61 IN | DIASTOLIC BLOOD PRESSURE: 70 MMHG | OXYGEN SATURATION: 98 % | TEMPERATURE: 97.8 F

## 2020-10-28 DIAGNOSIS — E66.09 CLASS 1 OBESITY DUE TO EXCESS CALORIES WITHOUT SERIOUS COMORBIDITY WITH BODY MASS INDEX (BMI) OF 32.0 TO 32.9 IN ADULT: ICD-10-CM

## 2020-10-28 DIAGNOSIS — J45.909 ASTHMA DUE TO SEASONAL ALLERGIES: ICD-10-CM

## 2020-10-28 DIAGNOSIS — J45.20 MILD INTERMITTENT ASTHMA WITHOUT COMPLICATION: ICD-10-CM

## 2020-10-28 DIAGNOSIS — F41.9 ANXIETY AND DEPRESSION: Primary | ICD-10-CM

## 2020-10-28 DIAGNOSIS — J01.90 ACUTE RHINOSINUSITIS: ICD-10-CM

## 2020-10-28 DIAGNOSIS — F32.A ANXIETY AND DEPRESSION: Primary | ICD-10-CM

## 2020-10-28 PROCEDURE — 99214 OFFICE O/P EST MOD 30 MIN: CPT | Performed by: INTERNAL MEDICINE

## 2020-10-28 RX ORDER — BUSPIRONE HYDROCHLORIDE 10 MG/1
5 TABLET ORAL DAILY
COMMUNITY

## 2020-10-28 RX ORDER — LORATADINE 10 MG/1
10 TABLET ORAL DAILY
Qty: 30 TABLET | Refills: 11 | Status: SHIPPED | OUTPATIENT
Start: 2020-10-28 | End: 2021-12-14

## 2020-10-28 RX ORDER — FLUTICASONE PROPIONATE 50 MCG
2 SPRAY, SUSPENSION (ML) NASAL DAILY
Qty: 9.9 ML | Refills: 2 | Status: SHIPPED | OUTPATIENT
Start: 2020-10-28 | End: 2022-08-24

## 2020-10-28 RX ORDER — FLUOXETINE 10 MG/1
10 CAPSULE ORAL DAILY
Qty: 30 CAPSULE | Refills: 5 | Status: SHIPPED | OUTPATIENT
Start: 2020-10-28 | End: 2021-03-19 | Stop reason: SDUPTHER

## 2020-10-28 NOTE — PROGRESS NOTES
CC: f/u anxiety    History:  Kristen Christie is a 35 y.o. female   She notes she has been doing reasonably well and has had significant improvement in her anxiety with the use of BuSpar in the morning and Prozac daily.  She feels it is adequately controlled at this time and requires no further pharmacologic assistance.    Her allergies have been flaring and she has been using Flonase to some effect, but she has had more congestion and pressure in her ears recently.  She has not used any antihistamine therapy, but has used a nasal lavage system that has been helpful.    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       ROS:  Review of Systems   Constitutional: Negative for chills and fever.   HENT: Positive for congestion, ear pain, sinus pressure and sinus pain.    Respiratory: Negative for cough and shortness of breath.    Cardiovascular: Negative for chest pain and palpitations.        reports that she quit smoking about 8 years ago. She quit after 10.00 years of use. She has never used smokeless tobacco. She reports previous alcohol use. She reports that she does not use drugs.      Current Outpatient Medications:   •  albuterol sulfate  (90 Base) MCG/ACT inhaler, Inhale 2 puffs Every 6 (Six) Hours As Needed for Wheezing or Shortness of Air (cough)., Disp: 1 inhaler, Rfl: 0  •  busPIRone (BUSPAR) 10 MG tablet, Take 5 mg by mouth Daily., Disp: , Rfl:   •  FLUoxetine (PROzac) 10 MG capsule, Take 1 capsule by mouth Daily., Disp: 30 capsule, Rfl: 5  •  fluticasone (FLONASE) 50 MCG/ACT nasal spray, 2 sprays into the nostril(s) as directed by provider Daily., Disp: 9.9 mL, Rfl: 2  •  loratadine (Claritin) 10 MG tablet, Take 1 tablet by mouth Daily., Disp: 30 tablet, Rfl: 11    OBJECTIVE:  /70 (BP Location: Left arm, Patient Position: Sitting, Cuff Size: Adult)   Pulse 76   Temp 97.8 °F (36.6 °C)   " Resp 16   Ht 154.9 cm (61\")   Wt 77.7 kg (171 lb 6.4 oz)   SpO2 98%   Breastfeeding No   BMI 32.39 kg/m²    Physical Exam  Constitutional:       General: She is not in acute distress.  HENT:      Right Ear: Tympanic membrane is retracted. Tympanic membrane is not scarred, erythematous or bulging.      Left Ear: Tympanic membrane is retracted. Tympanic membrane is not scarred, erythematous or bulging.   Cardiovascular:      Rate and Rhythm: Normal rate and regular rhythm.      Heart sounds: Normal heart sounds. No murmur.   Pulmonary:      Effort: Pulmonary effort is normal. No respiratory distress.      Breath sounds: Normal breath sounds. No wheezing.   Neurological:      Mental Status: She is alert and oriented to person, place, and time.      Gait: Gait normal.   Psychiatric:         Mood and Affect: Mood normal.         Behavior: Behavior normal.         Assessment/Plan     Diagnoses and all orders for this visit:    1. Anxiety and depression (Primary)  -     FLUoxetine (PROzac) 10 MG capsule; Take 1 capsule by mouth Daily.  Dispense: 30 capsule; Refill: 5  Well-controlled on Prozac and BuSpar without side effects.    2. Acute rhinosinusitis  3. Asthma due to seasonal allergies  -     fluticasone (FLONASE) 50 MCG/ACT nasal spray; 2 sprays into the nostril(s) as directed by provider Daily.  Dispense: 9.9 mL; Refill: 2  -     loratadine (Claritin) 10 MG tablet; Take 1 tablet by mouth Daily.  Dispense: 30 tablet; Refill: 11  Continue Flonase and we will add Claritin.  If symptoms not improved in the next 1 to 2 weeks, we would consider addition of Astelin.  She may continue with nasal lavage.    4. Mild intermittent asthma without complication  She may use albuterol as needed and she is without exacerbation today.    5. Class 1 obesity due to excess calories without serious comorbidity with body mass index (BMI) of 32.0 to 32.9 in adult  Recommended attention to portion control and being careful about the " types and timing of meals for the purpose of weight management.        An After Visit Summary was printed and given to the patient at discharge.  Return in about 6 months (around 4/28/2021) for Recheck.          Dany Jones D.O. 10/28/2020   Electronically signed.

## 2020-12-26 PROCEDURE — U0004 COV-19 TEST NON-CDC HGH THRU: HCPCS | Performed by: NURSE PRACTITIONER

## 2021-03-10 ENCOUNTER — HOSPITAL ENCOUNTER (OUTPATIENT)
Dept: GENERAL RADIOLOGY | Facility: HOSPITAL | Age: 36
Discharge: HOME | End: 2021-03-10

## 2021-03-10 PROCEDURE — 73030 X-RAY EXAM OF SHOULDER: CPT

## 2021-03-10 PROCEDURE — 73080 X-RAY EXAM OF ELBOW: CPT

## 2021-03-19 ENCOUNTER — OFFICE VISIT (OUTPATIENT)
Dept: INTERNAL MEDICINE | Facility: CLINIC | Age: 36
End: 2021-03-19

## 2021-03-19 VITALS
HEIGHT: 61 IN | SYSTOLIC BLOOD PRESSURE: 128 MMHG | HEART RATE: 84 BPM | WEIGHT: 172.1 LBS | BODY MASS INDEX: 32.49 KG/M2 | RESPIRATION RATE: 16 BRPM | TEMPERATURE: 97.5 F | DIASTOLIC BLOOD PRESSURE: 78 MMHG | OXYGEN SATURATION: 98 %
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
## 2021-03-19 DIAGNOSIS — S50.02XD CONTUSION OF LEFT ELBOW, SUBSEQUENT ENCOUNTER: ICD-10-CM

## 2021-03-19 DIAGNOSIS — F32.A ANXIETY AND DEPRESSION: ICD-10-CM

## 2021-03-19 DIAGNOSIS — F41.9 ANXIETY AND DEPRESSION: ICD-10-CM

## 2021-03-19 PROCEDURE — 99213 OFFICE O/P EST LOW 20 MIN: CPT | Performed by: INTERNAL MEDICINE

## 2021-03-19 RX ORDER — FLUOXETINE 10 MG/1
10 CAPSULE ORAL DAILY
Qty: 30 CAPSULE | Refills: 5 | Status: SHIPPED | OUTPATIENT
Start: 2021-03-19 | End: 2022-01-28 | Stop reason: SDUPTHER

## 2021-03-19 NOTE — PATIENT INSTRUCTIONS
RICE Therapy for Routine Care of Injuries  Many injuries can be cared for with rest, ice, compression, and elevation (RICE therapy). This includes:  · Resting the injured part.  · Putting ice on the injury.  · Putting pressure (compression) on the injury.  · Raising the injured part (elevation).  Using RICE therapy can help to lessen pain and swelling.  Supplies needed:  · Ice.  · Plastic bag.  · Towel.  · Elastic bandage.  · Pillow or pillows to raise (elevate) your injured body part.  How to care for your injury with RICE therapy  Rest  Limit your normal activities, and try not to use the injured part of your body. You can go back to your normal activities when your doctor says it is okay to do them and you feel okay. Ask your doctor if you should do exercises to help your injury get better.  Ice  Put ice on the injured area. Do not put ice on your bare skin.  · Put ice in a plastic bag.  · Place a towel between your skin and the bag.  · Leave the ice on for 20 minutes, 2-3 times a day. Use ice on as many days as told by your doctor.    Compression  Compression means putting pressure on the injured area. This can be done with an elastic bandage. If an elastic bandage has been put on your injury:  · Do not wrap the bandage too tight. Wrap the bandage more loosely if part of your body away from the bandage is blue, swollen, cold, painful, or loses feeling (gets numb).  · Take off the bandage and put it on again. Do this every 3-4 hours or as told by your doctor.  · See your doctor if the bandage seems to make your problems worse.    Elevation  Elevation means keeping the injured area raised. If you can, raise the injured area above your heart or the center of your chest.  Contact a doctor if:  · You keep having pain and swelling.  · Your symptoms get worse.  Get help right away if:  · You have sudden bad pain at your injury or lower than your injury.  · You have redness or more swelling around your injury.  · You  have tingling or numbness at your injury or lower than your injury, and it does not go away when you take off the bandage.  Summary  · Many injuries can be cared for using rest, ice, compression, and elevation (RICE therapy).  · You can go back to your normal activities when you feel okay and your doctor says it is okay.  · Put ice on the injured area as told by your doctor.  · Get help if your symptoms get worse or if you keep having pain and swelling.  This information is not intended to replace advice given to you by your health care provider. Make sure you discuss any questions you have with your health care provider.  Document Revised: 09/07/2018 Document Reviewed: 09/07/2018  Elsevier Patient Education © 2021 [employer]

## 2021-03-20 NOTE — PROGRESS NOTES
"CC: f/u elbow pain    History:  Kristen Christie is a 36 y.o. female   She has done reasonably well , but has ongoing pain in her left elbow after a recent fall from a skateboard. It has swollen and she has ongoing pain with manipulation from outside forces and with reaching on pressure on the arm, but it has slowly improved. She denies loss of ROM.        ROS:  Review of Systems   Musculoskeletal: Positive for arthralgias and joint swelling.   Neurological: Negative for weakness.        reports that she quit smoking about 9 years ago. She quit after 10.00 years of use. She has never used smokeless tobacco. She reports previous alcohol use. She reports that she does not use drugs.      Current Outpatient Medications:   •  albuterol sulfate  (90 Base) MCG/ACT inhaler, Inhale 2 puffs Every 6 (Six) Hours As Needed for Wheezing or Shortness of Air (cough)., Disp: 1 inhaler, Rfl: 0  •  busPIRone (BUSPAR) 10 MG tablet, Take 5 mg by mouth Daily., Disp: , Rfl:   •  FLUoxetine (PROzac) 10 MG capsule, Take 1 capsule by mouth Daily., Disp: 30 capsule, Rfl: 5  •  fluticasone (FLONASE) 50 MCG/ACT nasal spray, 2 sprays into the nostril(s) as directed by provider Daily., Disp: 9.9 mL, Rfl: 2  •  loratadine (Claritin) 10 MG tablet, Take 1 tablet by mouth Daily., Disp: 30 tablet, Rfl: 11    OBJECTIVE:  /78 (BP Location: Left arm, Patient Position: Sitting, Cuff Size: Adult)   Pulse 84   Temp 97.5 °F (36.4 °C)   Resp 16   Ht 154.9 cm (61\")   Wt 78.1 kg (172 lb 1.6 oz)   LMP 03/02/2021   SpO2 98%   Breastfeeding No   BMI 32.52 kg/m²    Physical Exam  Cardiovascular:      Pulses:           Radial pulses are 2+ on the right side and 2+ on the left side.   Musculoskeletal:      Comments: Swelling/subcutaneous edema of proximal forearm posteriorly just distal to the olecranon.    Skin:     General: Skin is warm and dry.      Capillary Refill: Capillary refill takes less than 2 seconds.   Neurological:      Comments:  " strength 5/5.         Assessment/Plan     Diagnoses and all orders for this visit:    1. Fall from skateboard, sequela (Primary)  2. Contusion of left elbow, subsequent encounter  She has had improvement. Recommend ongoing RICE therapy. If not improving over next 1-2 weeks, consider repeat imaging to eval for occult fracture. Otherwise, neurovascularly intact.     3. Anxiety and depression  -     FLUoxetine (PROzac) 10 MG capsule; Take 1 capsule by mouth Daily.  Dispense: 30 capsule; Refill: 5  Good control.       An After Visit Summary was printed and given to the patient at discharge.  Return in about 3 months (around 6/28/2021) for Annual physical; cancel April.          Dany Jones D.O. 3/19/2021   Electronically signed.

## 2021-07-26 ENCOUNTER — OFFICE VISIT (OUTPATIENT)
Dept: INTERNAL MEDICINE | Facility: CLINIC | Age: 36
End: 2021-07-26

## 2021-07-26 ENCOUNTER — LAB (OUTPATIENT)
Dept: LAB | Facility: HOSPITAL | Age: 36
End: 2021-07-26

## 2021-07-26 VITALS
HEART RATE: 88 BPM | OXYGEN SATURATION: 99 % | WEIGHT: 179.6 LBS | SYSTOLIC BLOOD PRESSURE: 140 MMHG | BODY MASS INDEX: 33.91 KG/M2 | DIASTOLIC BLOOD PRESSURE: 88 MMHG | TEMPERATURE: 100.8 F | HEIGHT: 61 IN

## 2021-07-26 DIAGNOSIS — J45.909 ASTHMA, UNSPECIFIED ASTHMA SEVERITY, UNSPECIFIED WHETHER COMPLICATED, UNSPECIFIED WHETHER PERSISTENT: ICD-10-CM

## 2021-07-26 DIAGNOSIS — J40 BRONCHITIS: Primary | ICD-10-CM

## 2021-07-26 DIAGNOSIS — J40 BRONCHITIS: ICD-10-CM

## 2021-07-26 LAB — SARS-COV-2 RNA PNL SPEC NAA+PROBE: DETECTED

## 2021-07-26 PROCEDURE — 87635 SARS-COV-2 COVID-19 AMP PRB: CPT

## 2021-07-26 PROCEDURE — 99213 OFFICE O/P EST LOW 20 MIN: CPT | Performed by: NURSE PRACTITIONER

## 2021-07-26 PROCEDURE — C9803 HOPD COVID-19 SPEC COLLECT: HCPCS

## 2021-07-26 RX ORDER — AZITHROMYCIN 250 MG/1
TABLET, FILM COATED ORAL
Qty: 6 TABLET | Refills: 0 | Status: SHIPPED | OUTPATIENT
Start: 2021-07-26 | End: 2021-08-12

## 2021-07-26 RX ORDER — ALBUTEROL SULFATE 90 UG/1
2 AEROSOL, METERED RESPIRATORY (INHALATION) EVERY 6 HOURS PRN
Qty: 18 G | Refills: 0 | Status: SHIPPED | OUTPATIENT
Start: 2021-07-26 | End: 2022-10-25 | Stop reason: SDUPTHER

## 2021-07-26 NOTE — PATIENT INSTRUCTIONS
Acute Bronchitis, Adult    Acute bronchitis is sudden or acute swelling of the air tubes (bronchi) in the lungs. Acute bronchitis causes these tubes to fill with mucus, which can make it hard to breathe. It can also cause coughing or wheezing.  In adults, acute bronchitis usually goes away within 2 weeks. A cough caused by bronchitis may last up to 3 weeks. Smoking, allergies, and asthma can make the condition worse.  What are the causes?  This condition can be caused by germs and by substances that irritate the lungs, including:  · Cold and flu viruses. The most common cause of this condition is the virus that causes the common cold.  · Bacteria.  · Substances that irritate the lungs, including:  ? Smoke from cigarettes and other forms of tobacco.  ? Dust and pollen.  ? Fumes from chemical products, gases, or burned fuel.  ? Other materials that pollute indoor or outdoor air.  · Close contact with someone who has acute bronchitis.  What increases the risk?  The following factors may make you more likely to develop this condition:  · A weak body's defense system, also called the immune system.  · A condition that affects your lungs and breathing, such as asthma.  What are the signs or symptoms?  Common symptoms of this condition include:  · Lung and breathing problems, such as:  ? Coughing. This may bring up clear, yellow, or green mucus from your lungs (sputum).  ? Wheezing.  ? Having too much mucus in your lungs (chest congestion).  ? Having shortness of breath.  · A fever.  · Chills.  · Aches and pains, including:  ? Tightness in your chest and other body aches.  ? A sore throat.  How is this diagnosed?  This condition is usually diagnosed based on:  · Your symptoms and medical history.  · A physical exam.  You may also have other tests, including tests to rule out other conditions, such pneumonia. These tests include:  · A test of lung function.  · Test of a mucus sample to look for the presence of  bacteria.  · Tests to check the oxygen level in your blood.  · Blood tests.  · Chest X-ray.  How is this treated?  Most cases of acute bronchitis clear up over time without treatment. Your health care provider may recommend:  · Drinking more fluids. This can thin your mucus, which may improve your breathing.  · Taking a medicine for a fever or cough.  · Using a device that gets medicine into your lungs (inhaler) to help improve breathing and control coughing.  · Using a vaporizer or a humidifier. These are machines that add water to the air to help you breathe better.  Follow these instructions at home:  Activity  · Get plenty of rest.  · Return to your normal activities as told by your health care provider. Ask your health care provider what activities are safe for you.  Lifestyle  · Drink enough fluid to keep your urine pale yellow.  · Do not drink alcohol.  · Do not use any products that contain nicotine or tobacco, such as cigarettes, e-cigarettes, and chewing tobacco. If you need help quitting, ask your health care provider. Be aware that:  ? Your bronchitis will get worse if you smoke or breathe in other people's smoke (secondhand smoke).  ? Your lungs will heal faster if you quit smoking.  General instructions    · Take over-the-counter and prescription medicines only as told by your health care provider.  · Use an inhaler, vaporizer, or humidifier as told by your health care provider.  · If you have a sore throat, gargle with a salt-water mixture 3-4 times a day or as needed. To make a salt-water mixture, completely dissolve ½-1 tsp (3-6 g) of salt in 1 cup (237 mL) of warm water.  · Keep all follow-up visits as told by your health care provider. This is important.  How is this prevented?  To lower your risk of getting this condition again:  · Wash your hands often with soap and water. If soap and water are not available, use hand .  · Avoid contact with people who have cold symptoms.  · Try not to  touch your mouth, nose, or eyes with your hands.  · Avoid places where there are fumes from chemicals. Breathing these fumes will make your condition worse.  · Get the flu shot every year.  Contact a health care provider if:  · Your symptoms do not improve after 2 weeks of treatment.  · You vomit more than once or twice.  · You have symptoms of dehydration such as:  ? Dark urine.  ? Dry skin or eyes.  ? Increased thirst.  ? Headaches.  ? Confusion.  ? Muscle cramps.  Get help right away if you:  · Cough up blood.  · Feel pain in your chest.  · Have severe shortness of breath.  · Faint or keep feeling like you are going to faint.  · Have a severe headache.  · Have fever or chills that get worse.  These symptoms may represent a serious problem that is an emergency. Do not wait to see if the symptoms will go away. Get medical help right away. Call your local emergency services (911 in the [place] Do not drive yourself to the hospital.  Summary  · Acute bronchitis is sudden (acute) inflammation of the air tubes (bronchi) between the windpipe and the lungs. In adults, acute bronchitis usually goes away within 2 weeks, although coughing may last 3 weeks or longer  · Take over-the-counter and prescription medicines only as told by your health care provider.  · Drink enough fluid to keep your urine pale yellow.  · Contact a health care provider if your symptoms do not improve after 2 weeks of treatment.  · Get help right away if you cough up blood, faint, or have chest pain or shortness of breath.  This information is not intended to replace advice given to you by your health care provider. Make sure you discuss any questions you have with your health care provider.  Document Revised: 08/31/2020 Document Reviewed: 07/10/2020  [employer] Patient Education © 2021 [employer]

## 2021-07-26 NOTE — PROGRESS NOTES
Chief Complaint   Patient presents with   • Nasal Congestion   • Exposure To Known Illness     all last week   • Fatigue         History:  Kristen Christie is a 36 y.o. female who presents today for evaluation of the above problems.          Saturday, sleepy, cough, inside nose burning, temp 103. Ears, jaws, throat hurts. Fatigue. Still with temp and fatigue today. Denies SOB.    Exposure to Covid positive person all last week, he tested positive Saturday.      ROS:  Review of Systems  As above    No Known Allergies  Past Medical History:   Diagnosis Date   • Abnormal Pap smear of cervix    • Arthritis    • Asthma    • Cervical dysplasia      Past Surgical History:   Procedure Laterality Date   • LEEP  2015   • TONSILLECTOMY  2004     Family History   Problem Relation Age of Onset   • Diabetes Father    • Hypertension Father    • Diabetes Paternal Grandmother    • Hypertension Paternal Grandmother    • Stroke Paternal Grandmother    • Stroke Mother    • Hypertension Sister    • Stroke Maternal Grandmother    • Breast cancer Maternal Grandmother 60   • Stroke Maternal Grandfather    • Hypertension Brother    • Ovarian cancer Maternal Great-Grandmother         age unknown   • Uterine cancer Neg Hx    • Colon cancer Neg Hx    • Melanoma Neg Hx    • Prostate cancer Neg Hx      Kristen  reports that she quit smoking about 9 years ago. She quit after 10.00 years of use. She has never used smokeless tobacco. She reports previous alcohol use. She reports that she does not use drugs.    I have reviewed and updated the above documentation (if necessary) including but not limited to chief complaint, ROS, PFSH, allergies and medications        Current Outpatient Medications:   •  albuterol sulfate  (90 Base) MCG/ACT inhaler, Inhale 2 puffs Every 6 (Six) Hours As Needed for Wheezing or Shortness of Air (cough)., Disp: 18 g, Rfl: 0  •  busPIRone (BUSPAR) 10 MG tablet, Take 5 mg by mouth Daily., Disp: , Rfl:   •  FLUoxetine  "(PROzac) 10 MG capsule, Take 1 capsule by mouth Daily., Disp: 30 capsule, Rfl: 5  •  fluticasone (FLONASE) 50 MCG/ACT nasal spray, 2 sprays into the nostril(s) as directed by provider Daily., Disp: 9.9 mL, Rfl: 2  •  loratadine (Claritin) 10 MG tablet, Take 1 tablet by mouth Daily., Disp: 30 tablet, Rfl: 11  •  azithromycin (Zithromax Z-Lalo) 250 MG tablet, Take 2 tablets by mouth on day 1, then 1 tablet daily on days 2-5, Disp: 6 tablet, Rfl: 0    PHQ-9 Depression Screening  Little interest or pleasure in doing things?     Feeling down, depressed, or hopeless?     Trouble falling or staying asleep, or sleeping too much?     Feeling tired or having little energy?     Poor appetite or overeating?     Feeling bad about yourself - or that you are a failure or have let yourself or your family down?     Trouble concentrating on things, such as reading the newspaper or watching television?     Moving or speaking so slowly that other people could have noticed? Or the opposite - being so fidgety or restless that you have been moving around a lot more than usual?     Thoughts that you would be better off dead, or of hurting yourself in some way?     PHQ-9 Total Score     If you checked off any problems, how difficult have these problems made it for you to do your work, take care of things at home, or get along with other people?       PHQ-9 Total Score:      OBJECTIVE:  Visit Vitals  /88 (BP Location: Left arm, Patient Position: Sitting, Cuff Size: Adult)   Pulse 88   Temp (!) 100.8 °F (38.2 °C) (Oral)   Ht 154.9 cm (61\")   Wt 81.5 kg (179 lb 9.6 oz)   SpO2 99%   Breastfeeding No   BMI 33.94 kg/m²      Physical Exam  Vitals and nursing note reviewed.   Constitutional:       General: She is not in acute distress.     Appearance: Normal appearance. She is not ill-appearing, toxic-appearing or diaphoretic.   HENT:      Head: Normocephalic and atraumatic.      Right Ear: Tympanic membrane, ear canal and external ear normal. " There is no impacted cerumen.      Left Ear: Tympanic membrane, ear canal and external ear normal. There is no impacted cerumen.      Nose: Nose normal. No congestion or rhinorrhea.      Mouth/Throat:      Mouth: Mucous membranes are dry.      Pharynx: Oropharynx is clear. No oropharyngeal exudate or posterior oropharyngeal erythema.   Eyes:      General: No scleral icterus.        Right eye: No discharge.         Left eye: No discharge.      Extraocular Movements: Extraocular movements intact.      Conjunctiva/sclera: Conjunctivae normal.      Pupils: Pupils are equal, round, and reactive to light.   Cardiovascular:      Rate and Rhythm: Normal rate and regular rhythm.      Pulses: Normal pulses.      Heart sounds: Normal heart sounds. No murmur heard.   No friction rub. No gallop.    Pulmonary:      Effort: Pulmonary effort is normal. No respiratory distress.      Breath sounds: No stridor. Wheezing (occasional upper respiratory wheeze bilaterally) present. No rhonchi or rales.      Comments: She is coughing persistently throughout exam.  Abdominal:      General: There is no distension.      Palpations: Abdomen is soft.      Tenderness: There is no abdominal tenderness.   Musculoskeletal:         General: No swelling. Normal range of motion.      Cervical back: Normal range of motion and neck supple. No rigidity.      Right lower leg: No edema.      Left lower leg: No edema.   Lymphadenopathy:      Cervical: No cervical adenopathy.   Skin:     General: Skin is warm and dry.      Coloration: Skin is not jaundiced or pale.      Findings: No bruising, erythema, lesion or rash.   Neurological:      General: No focal deficit present.      Mental Status: She is alert and oriented to person, place, and time.   Psychiatric:         Mood and Affect: Mood normal.         Behavior: Behavior normal.         Thought Content: Thought content normal.         Judgment: Judgment normal.         MDM    Assessment/Plan    Diagnoses  and all orders for this visit:    1. Bronchitis (Primary)  -     COVID PRE-OP / PRE-PROCEDURE SCREENING ORDER (NO ISOLATION) - Swab, Nasal Cavity; Future  -     azithromycin (Zithromax Z-Lalo) 250 MG tablet; Take 2 tablets by mouth on day 1, then 1 tablet daily on days 2-5  Dispense: 6 tablet; Refill: 0  -     albuterol sulfate  (90 Base) MCG/ACT inhaler; Inhale 2 puffs Every 6 (Six) Hours As Needed for Wheezing or Shortness of Air (cough).  Dispense: 18 g; Refill: 0    2. Asthma, unspecified asthma severity, unspecified whether complicated, unspecified whether persistent  -     albuterol sulfate  (90 Base) MCG/ACT inhaler; Inhale 2 puffs Every 6 (Six) Hours As Needed for Wheezing or Shortness of Air (cough).  Dispense: 18 g; Refill: 0    She has symptoms of Covid following exposure.  I would recommend she quarantine at home for 10 days from onset of symptoms.  Will check Covid test today and follow up with results.  We discussed that she will hold onto the Z-pack for a couple of days and that if it is Covid or viral, the antibiotics will not be indicated; however, if she is worsening I would like her to start it.  She also has a history of asthma but has not been using the inhaler lately.  I recommended she use it at least 2-3 times a day right now until she is feeling better.      Education materials and an After Visit Summary were printed and given to the patient at discharge.  No follow-ups on file. She is going to call to schedule her yearly.         Mercedes Cai, APRN   15:28 CDT  7/26/2021

## 2021-07-30 ENCOUNTER — TELEPHONE (OUTPATIENT)
Dept: INTERNAL MEDICINE | Facility: CLINIC | Age: 36
End: 2021-07-30

## 2021-08-09 ENCOUNTER — TELEPHONE (OUTPATIENT)
Dept: INTERNAL MEDICINE | Facility: CLINIC | Age: 36
End: 2021-08-09

## 2021-08-09 NOTE — TELEPHONE ENCOUNTER
Caller: Kristen Chirstie    Relationship to patient: Self    Best call back number: [phone]     Patient is needing: PATIENT TESTED POSITIVE FOR COVID 2 WEEKS AGO. HAS BEEN OKAY FOR THE LAST COUPLE DAYS. TODAY SHE HAS A FEVER (100.4) AND SOME BACK PAIN, WITH FATIGUE.     PATIENT IS WANTING TO KNOW WHAT SHE NEEDS TO DO. PLEASE ADVISE.

## 2021-08-09 NOTE — TELEPHONE ENCOUNTER
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 She will let us know if she is still having symptoms in a few day.

## 2021-08-09 NOTE — TELEPHONE ENCOUNTER
Today is day 14. Some people have reported symptoms up to 14 days. Monitor at this time and remain in quarantine with fever. Can provide work note for the full 14 days if needed.

## 2021-08-12 ENCOUNTER — OFFICE VISIT (OUTPATIENT)
Dept: INTERNAL MEDICINE | Facility: CLINIC | Age: 36
End: 2021-08-12

## 2021-08-12 ENCOUNTER — HOSPITAL ENCOUNTER (OUTPATIENT)
Dept: GENERAL RADIOLOGY | Facility: HOSPITAL | Age: 36
Discharge: HOME | End: 2021-08-12
Admitting: NURSE PRACTITIONER

## 2021-08-12 VITALS
HEIGHT: 61 IN | OXYGEN SATURATION: 98 % | RESPIRATION RATE: 16 BRPM | SYSTOLIC BLOOD PRESSURE: 102 MMHG | HEART RATE: 64 BPM | BODY MASS INDEX: 34.55 KG/M2 | TEMPERATURE: 98.2 F | WEIGHT: 183 LBS | DIASTOLIC BLOOD PRESSURE: 70 MMHG

## 2021-08-12 DIAGNOSIS — M54.6 ACUTE LEFT-SIDED THORACIC BACK PAIN: ICD-10-CM

## 2021-08-12 DIAGNOSIS — J45.20 MILD INTERMITTENT ASTHMA WITHOUT COMPLICATION: ICD-10-CM

## 2021-08-12 DIAGNOSIS — E66.09 CLASS 1 OBESITY DUE TO EXCESS CALORIES WITHOUT SERIOUS COMORBIDITY WITH BODY MASS INDEX (BMI) OF 34.0 TO 34.9 IN ADULT: Primary | ICD-10-CM

## 2021-08-12 DIAGNOSIS — U07.1 COVID-19: ICD-10-CM

## 2021-08-12 DIAGNOSIS — R06.02 SOB (SHORTNESS OF BREATH): Primary | ICD-10-CM

## 2021-08-12 DIAGNOSIS — R06.02 SOB (SHORTNESS OF BREATH): ICD-10-CM

## 2021-08-12 PROCEDURE — 71046 X-RAY EXAM CHEST 2 VIEWS: CPT

## 2021-08-12 PROCEDURE — 99214 OFFICE O/P EST MOD 30 MIN: CPT | Performed by: NURSE PRACTITIONER

## 2021-08-12 NOTE — PROGRESS NOTES
Chief Complaint   Patient presents with   • Illness     still having cough, SOA (back pain on exhalation and chest feels heavy during inhalation), also having back pain/burning sensation in back and leg swelling, headache, sore throat       History:  Kristen Christie is a 36 y.o. female who presents today for follow-up for evaluation of the above:    HPI   Patient presents today for follow-up after having COVID-19 infection.  She is more than 2 weeks post onset of symptoms.  She states that she finished a course of azithromycin that was prescribed to her at the time she was found positive yesterday.  She is not a smoker.  Not taking birth control  She reports that she continues to have a frequent cough.  She feels short of breath with activity and feels that when she tries to take a deep breath in her chest is heavy.  She also reports a burning and pain sensation in her back with breathing.  She also reports mild swelling of her legs but this is bilateral.  No redness or warmth today.  hoarseness         ROS:  Review of Systems   Constitutional: Positive for fever.   HENT: Positive for sore throat.    Respiratory: Positive for cough and shortness of breath.    Cardiovascular: Positive for chest pain and leg swelling.   Musculoskeletal: Positive for back pain.   Neurological: Positive for headaches.       Ms. Christie  reports that she quit smoking about 9 years ago. She quit after 10.00 years of use. She has never used smokeless tobacco. She reports previous alcohol use. She reports that she does not use drugs.      Current Outpatient Medications:   •  albuterol sulfate  (90 Base) MCG/ACT inhaler, Inhale 2 puffs Every 6 (Six) Hours As Needed for Wheezing or Shortness of Air (cough)., Disp: 18 g, Rfl: 0  •  busPIRone (BUSPAR) 10 MG tablet, Take 5 mg by mouth Daily., Disp: , Rfl:   •  FLUoxetine (PROzac) 10 MG capsule, Take 1 capsule by mouth Daily., Disp: 30 capsule, Rfl: 5  •  fluticasone (FLONASE) 50 MCG/ACT nasal  "spray, 2 sprays into the nostril(s) as directed by provider Daily., Disp: 9.9 mL, Rfl: 2  •  loratadine (Claritin) 10 MG tablet, Take 1 tablet by mouth Daily., Disp: 30 tablet, Rfl: 11      OBJECTIVE:  /70 (BP Location: Left arm, Patient Position: Sitting, Cuff Size: Adult)   Pulse 64   Temp 98.2 °F (36.8 °C) (Temporal)   Resp 16   Ht 154.9 cm (61\")   Wt 83 kg (183 lb)   SpO2 98%   BMI 34.58 kg/m²    Physical Exam  Vitals reviewed.   Constitutional:       Appearance: She is well-developed.   HENT:      Head: Normocephalic and atraumatic.   Eyes:      Pupils: Pupils are equal, round, and reactive to light.   Cardiovascular:      Rate and Rhythm: Normal rate and regular rhythm.      Heart sounds: Normal heart sounds.   Pulmonary:      Effort: Pulmonary effort is normal.      Breath sounds: Normal breath sounds.   Abdominal:      General: Bowel sounds are normal.      Palpations: Abdomen is soft.   Musculoskeletal:         General: Normal range of motion.      Cervical back: Normal range of motion and neck supple.        Back:       Right lower leg: Edema (mild) present.      Left lower leg: Edema (mild) present.   Skin:     General: Skin is warm and dry.   Neurological:      Mental Status: She is alert and oriented to person, place, and time.         Assessment/Plan    Diagnoses and all orders for this visit:    1. Class 1 obesity due to excess calories without serious comorbidity with body mass index (BMI) of 34.0 to 34.9 in adult (Primary)    2. Mild intermittent asthma without complication    3. COVID-19  -     XR Chest PA & Lateral; Future    4. SOB (shortness of breath)    5. Acute left-sided thoracic back pain    Patient has completed a round of antibiotics with Covid infection.  Will order chest x-ray to rule out pneumonia with her symptoms that are continuing and back pain that is new.  If negative would consider CTA to rule out pulmonary embolism.  She reports she has stopped using her albuterol. " She is encouraged to restart this for SOB    She is provided with a lingering symptoms from COVID-19 form to seek long-term COVID-19 help.         An After Visit Summary was printed and given to the patient at discharge.  Return if symptoms worsen or fail to improve, for Next scheduled follow up. Sooner if problems arise.          Ara Gutierrez APRN. 8/12/2021   Electronically Signed

## 2021-08-13 ENCOUNTER — LAB (OUTPATIENT)
Dept: LAB | Facility: HOSPITAL | Age: 36
End: 2021-08-13

## 2021-08-13 DIAGNOSIS — R06.02 SOB (SHORTNESS OF BREATH): ICD-10-CM

## 2021-08-13 DIAGNOSIS — M54.6 ACUTE LEFT-SIDED THORACIC BACK PAIN: ICD-10-CM

## 2021-08-13 LAB — D DIMER PPP FEU-MCNC: 0.46 MG/L (FEU) (ref 0–0.5)

## 2021-08-13 PROCEDURE — 85379 FIBRIN DEGRADATION QUANT: CPT

## 2021-08-13 PROCEDURE — 36415 COLL VENOUS BLD VENIPUNCTURE: CPT

## 2021-08-16 ENCOUNTER — TELEPHONE (OUTPATIENT)
Dept: INTERNAL MEDICINE | Facility: CLINIC | Age: 36
End: 2021-08-16

## 2021-08-16 NOTE — TELEPHONE ENCOUNTER
Caller: Kristen Christie    Relationship: Self    Best call back number:[phone] ([phone])    Caller requesting test results: LAB RESULTS   What test was performed: LABS     When was the test performed AUGUST 12     Where was the test performed: IN OFFICE     Additional notes:  [facility] SENT HER A MESSAGE ON MY CHART BUT SHE WAS UNABLE TO SEND BACK A MESSAGE TO HER

## 2021-08-16 NOTE — TELEPHONE ENCOUNTER
PATIENT HAS  BEEN CALLED, SHE STATED THAT SHE IS STILL HAVING DIZZY SPELLS, SHE BREAKS OUT IN A COLD SWEAT, HER O2 SAT HAS DROPPED (ON SATURDAY) TO 87 AND ALSO AROUND 90.    SHE STATED THAT IT IS NORMALLY AROUND 97%.    PATIENT STATED THAT HER CHEST IS STILL HEAVY BUT NOT UNBEARABLE AND HER BACK HURTS.

## 2021-08-16 NOTE — TELEPHONE ENCOUNTER
Where is the progress on her CT?  If she is seeing that drastic of a drop with SOB she may need to got to the ER.

## 2021-08-17 NOTE — TELEPHONE ENCOUNTER
Fatigue is one of the symptoms associated with long term COVID recovery. This can sometimes be present for months. Has she contacted the long term covid symptom resource she was given in the office visit?

## 2021-08-17 NOTE — TELEPHONE ENCOUNTER
Ct is scheduled for Tuesday, 8/24/2021 9am. She states that her 02 is staying between 90-95 has not dropped low in a few days, BP is staying 95/52 on a average its normal in the range of 120-130/70-80. Was wondering if she should be concerned she is still feeling fatigued and no energy, was diagnosed with covid on 7/26/2021. She has not been vaccinated.

## 2021-08-17 NOTE — TELEPHONE ENCOUNTER
PATIENT HAS BEEN CALLED, GIVEN MESSAGE AND STATED THAT SHE HAS NOT CONTACTED THE LONG TERM COVID  SYSTEM RESOURCE.

## 2021-08-24 ENCOUNTER — HOSPITAL ENCOUNTER (OUTPATIENT)
Dept: CT IMAGING | Facility: HOSPITAL | Age: 36
Discharge: HOME | End: 2021-08-24
Admitting: NURSE PRACTITIONER

## 2021-08-24 DIAGNOSIS — R06.02 SOB (SHORTNESS OF BREATH): ICD-10-CM

## 2021-08-24 DIAGNOSIS — M54.6 ACUTE LEFT-SIDED THORACIC BACK PAIN: ICD-10-CM

## 2021-08-24 PROCEDURE — 71275 CT ANGIOGRAPHY CHEST: CPT

## 2021-08-24 PROCEDURE — 0 IOPAMIDOL PER 1 ML: Performed by: NURSE PRACTITIONER

## 2021-08-24 RX ADMIN — IOPAMIDOL 75 ML: 755 INJECTION, SOLUTION INTRAVENOUS at 09:46

## 2021-09-20 ENCOUNTER — TELEPHONE (OUTPATIENT)
Dept: INTERNAL MEDICINE | Facility: CLINIC | Age: 36
End: 2021-09-20

## 2021-09-20 NOTE — TELEPHONE ENCOUNTER
Did she reach out to the LONG COVID resource we discussed at her office visit?   [facility]

## 2021-09-20 NOTE — TELEPHONE ENCOUNTER
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 I mentioned to her that the symptoms could last up to 6 months or so, and she states that she was told that, but was wondering if she could do something else to help.

## 2021-10-18 ENCOUNTER — OFFICE VISIT (OUTPATIENT)
Dept: OBSTETRICS AND GYNECOLOGY | Facility: CLINIC | Age: 36
End: 2021-10-18

## 2021-10-18 VITALS
BODY MASS INDEX: 37 KG/M2 | HEIGHT: 61 IN | SYSTOLIC BLOOD PRESSURE: 118 MMHG | DIASTOLIC BLOOD PRESSURE: 68 MMHG | WEIGHT: 196 LBS

## 2021-10-18 DIAGNOSIS — Z12.4 CERVICAL CANCER SCREENING: ICD-10-CM

## 2021-10-18 DIAGNOSIS — N89.8 VAGINAL IRRITATION: ICD-10-CM

## 2021-10-18 DIAGNOSIS — Z01.419 WELL WOMAN EXAM WITH ROUTINE GYNECOLOGICAL EXAM: Primary | ICD-10-CM

## 2021-10-18 PROCEDURE — 99395 PREV VISIT EST AGE 18-39: CPT | Performed by: NURSE PRACTITIONER

## 2021-10-18 PROCEDURE — 3008F BODY MASS INDEX DOCD: CPT | Performed by: NURSE PRACTITIONER

## 2021-10-18 PROCEDURE — 87563 M. GENITALIUM AMP PROBE: CPT | Performed by: NURSE PRACTITIONER

## 2021-10-18 PROCEDURE — 87661 TRICHOMONAS VAGINALIS AMPLIF: CPT | Performed by: NURSE PRACTITIONER

## 2021-10-18 PROCEDURE — 87481 CANDIDA DNA AMP PROBE: CPT | Performed by: NURSE PRACTITIONER

## 2021-10-18 PROCEDURE — 87798 DETECT AGENT NOS DNA AMP: CPT | Performed by: NURSE PRACTITIONER

## 2021-10-18 PROCEDURE — 87512 GARDNER VAG DNA QUANT: CPT | Performed by: NURSE PRACTITIONER

## 2021-10-18 PROCEDURE — G0123 SCREEN CERV/VAG THIN LAYER: HCPCS | Performed by: NURSE PRACTITIONER

## 2021-10-18 PROCEDURE — 99213 OFFICE O/P EST LOW 20 MIN: CPT | Performed by: NURSE PRACTITIONER

## 2021-10-18 PROCEDURE — 87624 HPV HI-RISK TYP POOLED RSLT: CPT | Performed by: NURSE PRACTITIONER

## 2021-10-18 NOTE — PROGRESS NOTES
Subjective   Kristen Christie is a 36 y.o. female  YOB: 1985        Chief Complaint   Patient presents with   • Gynecologic Exam     Patient here for yearly exam. Last exam was done 9/30/19 and was LGSIL, patient had colpo done 10/16/19  and was consistent with pap. Patient states that the week after her period after she has used tampons the entire week she has had vaginal irritation. Patient states her flow has not been as heavy and didn't know if the tampons could be causing the issues.        Gynecologic Exam  The patient's pertinent negatives include no pelvic pain. Pertinent negatives include no abdominal pain, back pain, constipation, diarrhea, dysuria, fever, frequency, hematuria, nausea, rash, sore throat, urgency or vomiting.       The following portions of the patient's history were reviewed and updated as appropriate: allergies, current medications, past family history, past medical history, past social history, past surgical history and problem list.    No Known Allergies    Past Medical History:   Diagnosis Date   • Abnormal Pap smear of cervix    • Anxiety    • Arthritis    • Asthma    • Cervical dysplasia    • COVID-19 virus infection 07/2021   • Depression    • Seasonal allergies        Family History   Problem Relation Age of Onset   • Diabetes Father    • Hypertension Father    • Diabetes Paternal Grandmother    • Hypertension Paternal Grandmother    • Stroke Paternal Grandmother    • Stroke Mother    • Hypertension Sister    • Stroke Maternal Grandmother    • Breast cancer Maternal Grandmother 60   • Stroke Maternal Grandfather    • Hypertension Brother    • Ovarian cancer Maternal Great-Grandmother         age unknown   • Uterine cancer Neg Hx    • Colon cancer Neg Hx    • Melanoma Neg Hx    • Prostate cancer Neg Hx        Social History     Socioeconomic History   • Marital status:    Tobacco Use   • Smoking status: Former Smoker     Years: 10.00     Quit date: 1/6/2012      Years since quittin.7   • Smokeless tobacco: Never Used   Vaping Use   • Vaping Use: Never used   Substance and Sexual Activity   • Alcohol use: Not Currently     Comment: occ   • Drug use: No   • Sexual activity: Not Currently     Partners: Male         Current Outpatient Medications:   •  albuterol sulfate  (90 Base) MCG/ACT inhaler, Inhale 2 puffs Every 6 (Six) Hours As Needed for Wheezing or Shortness of Air (cough)., Disp: 18 g, Rfl: 0  •  busPIRone (BUSPAR) 10 MG tablet, Take 5 mg by mouth Daily., Disp: , Rfl:   •  FLUoxetine (PROzac) 10 MG capsule, Take 1 capsule by mouth Daily., Disp: 30 capsule, Rfl: 5  •  fluticasone (FLONASE) 50 MCG/ACT nasal spray, 2 sprays into the nostril(s) as directed by provider Daily., Disp: 9.9 mL, Rfl: 2  •  loratadine (Claritin) 10 MG tablet, Take 1 tablet by mouth Daily., Disp: 30 tablet, Rfl: 11    No LMP recorded.    Sexual History:           Could not be calculated    Past Surgical History:   Procedure Laterality Date   • COLPOSCOPY     • LEEP  2015   • TONSILLECTOMY  2004       Review of Systems   Constitutional: Negative for activity change, appetite change, fatigue, fever, unexpected weight gain and unexpected weight loss.   HENT: Negative for congestion, ear pain, hearing loss, nosebleeds, rhinorrhea, sore throat, tinnitus and trouble swallowing.    Eyes: Negative for blurred vision, pain, discharge, itching and visual disturbance.   Respiratory: Negative for apnea, chest tightness, shortness of breath and wheezing.    Cardiovascular: Negative for chest pain and leg swelling.   Gastrointestinal: Negative for abdominal pain, blood in stool, constipation, diarrhea, nausea, vomiting and GERD.   Endocrine: Negative for heat intolerance, polydipsia and polyuria.   Genitourinary: Negative for breast lump, decreased libido, difficulty urinating, dyspareunia, dysuria, frequency, genital sores, hematuria, menstrual problem, pelvic pain, urgency, urinary incontinence  and vaginal pain.   Musculoskeletal: Negative for arthralgias, back pain, joint swelling and myalgias.   Skin: Negative for color change, rash and skin lesions.   Allergic/Immunologic: Negative for environmental allergies, food allergies and immunocompromised state.   Neurological: Negative for dizziness, tremors, seizures, syncope, facial asymmetry, numbness and headache.   Hematological: Negative for adenopathy. Does not bruise/bleed easily.   Psychiatric/Behavioral: Negative for agitation, hallucinations, sleep disturbance, suicidal ideas and depressed mood. The patient is not nervous/anxious.        Objective   Physical Exam  Vitals and nursing note reviewed. Exam conducted with a chaperone present.   Constitutional:       General: She is not in acute distress.     Appearance: She is well-developed. She is not diaphoretic.   HENT:      Head: Normocephalic.      Right Ear: External ear normal.      Left Ear: External ear normal.      Nose: Nose normal.   Eyes:      General: No scleral icterus.        Right eye: No discharge.         Left eye: No discharge.      Conjunctiva/sclera: Conjunctivae normal.      Pupils: Pupils are equal, round, and reactive to light.   Neck:      Thyroid: No thyromegaly.      Vascular: No carotid bruit.      Trachea: No tracheal deviation.   Cardiovascular:      Rate and Rhythm: Normal rate and regular rhythm.      Heart sounds: Normal heart sounds. No murmur heard.      Pulmonary:      Effort: Pulmonary effort is normal. No respiratory distress.      Breath sounds: Normal breath sounds. No wheezing.   Chest:   Breasts: Breasts are symmetrical.      Right: Normal. No swelling, bleeding, inverted nipple, mass, nipple discharge, skin change, tenderness, axillary adenopathy or supraclavicular adenopathy.      Left: Normal. No swelling, bleeding, inverted nipple, mass, nipple discharge, skin change, tenderness, axillary adenopathy or supraclavicular adenopathy.       Abdominal:       General: There is no distension.      Palpations: Abdomen is soft. There is no mass.      Tenderness: There is no abdominal tenderness. There is no right CVA tenderness, left CVA tenderness or guarding.      Hernia: No hernia is present. There is no hernia in the left inguinal area or right inguinal area.   Genitourinary:     General: Normal vulva.      Exam position: Lithotomy position.      Labia:         Right: No rash, tenderness, lesion or injury.         Left: No rash, tenderness, lesion or injury.       Vagina: Normal. No signs of injury and foreign body. No vaginal discharge, erythema, tenderness or bleeding.      Cervix: Normal.      Uterus: Normal. Not enlarged, not fixed and not tender.       Adnexa: Right adnexa normal and left adnexa normal.        Right: No mass, tenderness or fullness.          Left: No mass, tenderness or fullness.        Rectum: Normal. No mass.      Comments:   BSU normal  Urethral meatus  Normal  Perineum  Normal  Musculoskeletal:         General: No tenderness. Normal range of motion.      Cervical back: Normal range of motion and neck supple.   Lymphadenopathy:      Head:      Right side of head: No submental, submandibular, tonsillar, preauricular, posterior auricular or occipital adenopathy.      Left side of head: No submental, submandibular, tonsillar, preauricular, posterior auricular or occipital adenopathy.      Cervical: No cervical adenopathy.      Right cervical: No superficial, deep or posterior cervical adenopathy.     Left cervical: No superficial, deep or posterior cervical adenopathy.      Upper Body:      Right upper body: No supraclavicular, axillary or pectoral adenopathy.      Left upper body: No supraclavicular, axillary or pectoral adenopathy.      Lower Body: No right inguinal adenopathy. No left inguinal adenopathy.   Skin:     General: Skin is warm and dry.      Findings: No bruising, erythema or rash.   Neurological:      Mental Status: She is alert and  "oriented to person, place, and time.      Coordination: Coordination normal.   Psychiatric:         Mood and Affect: Mood normal.         Behavior: Behavior normal.         Thought Content: Thought content normal.         Judgment: Judgment normal.           Vitals:    10/18/21 1439   BP: 118/68   Weight: 88.9 kg (196 lb)   Height: 154.9 cm (61\")       Diagnoses and all orders for this visit:    1. Well woman exam with routine gynecological exam (Primary)  Comments:  Normal well woman exam.  ThinPrep Pap smear done.  Mammogram ordered.  Orders:  -     Liquid-based Pap Smear, Screening    2. Cervical cancer screening  Comments:  ThinPrep Pap smear done.  Orders:  -     Liquid-based Pap Smear, Screening    3. Vaginal irritation  Comments:  Patient reports vaginal irritation times the last several weeks.  BV panel done via Pap-follow-up pending results.        Normal GYN exam. Will have lab work here. Encouraged SBE.  Pt is aware how to do self breast exam and the importance of same. Discussed weight management and importance of maintaining a healthy weight. Discussed Vitamin D intake and the importance of adequate vitamin D for both bone health and a healthy immune system.  Discussed daily exercise and the importance of same in regards to a healthy heart as well as helping to maintain her weight and improving her mental health.  Body mass index is 37.03 kg/m². Colonoscopy is not age appropriate.  Mammogram will be scheduled at [facility]. Pap smear is done per ASCCP guidelines.    Patient's Body mass index is 37.03 kg/m². indicating that she is obese (BMI >30). Obesity-related health conditions include the following: asthma. Obesity is unchanged. BMI is is above average; BMI management plan is completed. We discussed low calorie, low carb based diet program, portion control and increasing exercise..             Non-Smoker    MyChart Instructions Given       "

## 2021-10-20 LAB — HPV I/H RISK 4 DNA CVX QL PROBE+SIG AMP: NOT DETECTED

## 2021-10-21 LAB
GEN CATEG CVX/VAG CYTO-IMP: NORMAL
LAB AP CASE REPORT: NORMAL
LAB AP GYN ADDITIONAL INFORMATION: NORMAL
PATH INTERP SPEC-IMP: NORMAL
STAT OF ADQ CVX/VAG CYTO-IMP: NORMAL
TRICHOMONAS VAGINALIS PCR: NOT DETECTED

## 2021-12-14 DIAGNOSIS — J45.909 ASTHMA DUE TO SEASONAL ALLERGIES: ICD-10-CM

## 2021-12-14 RX ORDER — LORATADINE 10 MG/1
10 TABLET ORAL DAILY
Qty: 30 TABLET | Refills: 10 | Status: SHIPPED | OUTPATIENT
Start: 2021-12-14 | End: 2022-08-24 | Stop reason: SDUPTHER

## 2022-01-18 ENCOUNTER — LAB (OUTPATIENT)
Dept: LAB | Facility: HOSPITAL | Age: 37
End: 2022-01-18

## 2022-01-18 ENCOUNTER — TELEPHONE (OUTPATIENT)
Dept: INTERNAL MEDICINE | Facility: CLINIC | Age: 37
End: 2022-01-18

## 2022-01-18 DIAGNOSIS — R50.9 FEVER, UNSPECIFIED FEVER CAUSE: ICD-10-CM

## 2022-01-18 DIAGNOSIS — R50.9 FEVER, UNSPECIFIED FEVER CAUSE: Primary | ICD-10-CM

## 2022-01-18 LAB — SARS-COV-2 ORF1AB RESP QL NAA+PROBE: DETECTED

## 2022-01-18 PROCEDURE — C9803 HOPD COVID-19 SPEC COLLECT: HCPCS

## 2022-01-18 PROCEDURE — U0004 COV-19 TEST NON-CDC HGH THRU: HCPCS

## 2022-01-18 NOTE — TELEPHONE ENCOUNTER
Caller: Kristen Christie    Relationship to patient: Self    Best call back number: [phone]    Patient is needing an order placed for a covid test at [facility]. She is having congestion, mild SOB and cough. Her son tested positive today.  Please advise.

## 2022-01-18 NOTE — TELEPHONE ENCOUNTER
PATIENT HAS BEEN CALLED, SHE STATED THAT HOARSENESS, CONGESTION, FEVER 101,  COUGH AND MILD SOB STARTED 2 DAYS AGO.  PATIENT STATED THAT HER SON TESTED + 01/18/2022.

## 2022-01-18 NOTE — TELEPHONE ENCOUNTER
We tried to do a video visit yesterday and she wanted to be seen to be able to listen to her lungs. I suggest she could go to , but did not.

## 2022-01-28 ENCOUNTER — OFFICE VISIT (OUTPATIENT)
Dept: INTERNAL MEDICINE | Facility: CLINIC | Age: 37
End: 2022-01-28

## 2022-01-28 VITALS
WEIGHT: 195.6 LBS | BODY MASS INDEX: 36.93 KG/M2 | SYSTOLIC BLOOD PRESSURE: 126 MMHG | HEART RATE: 81 BPM | HEIGHT: 61 IN | DIASTOLIC BLOOD PRESSURE: 84 MMHG | TEMPERATURE: 97.6 F | OXYGEN SATURATION: 98 % | RESPIRATION RATE: 16 BRPM

## 2022-01-28 DIAGNOSIS — Z00.01 ANNUAL VISIT FOR GENERAL ADULT MEDICAL EXAMINATION WITH ABNORMAL FINDINGS: Primary | ICD-10-CM

## 2022-01-28 DIAGNOSIS — F32.A ANXIETY AND DEPRESSION: ICD-10-CM

## 2022-01-28 DIAGNOSIS — F41.9 ANXIETY AND DEPRESSION: ICD-10-CM

## 2022-01-28 DIAGNOSIS — J45.20 MILD INTERMITTENT ASTHMA WITHOUT COMPLICATION: ICD-10-CM

## 2022-01-28 DIAGNOSIS — E66.09 CLASS 2 OBESITY DUE TO EXCESS CALORIES WITHOUT SERIOUS COMORBIDITY WITH BODY MASS INDEX (BMI) OF 37.0 TO 37.9 IN ADULT: ICD-10-CM

## 2022-01-28 PROBLEM — E66.812 CLASS 2 OBESITY DUE TO EXCESS CALORIES WITHOUT SERIOUS COMORBIDITY WITH BODY MASS INDEX (BMI) OF 37.0 TO 37.9 IN ADULT: Status: ACTIVE | Noted: 2018-04-06

## 2022-01-28 PROCEDURE — 2014F MENTAL STATUS ASSESS: CPT | Performed by: INTERNAL MEDICINE

## 2022-01-28 PROCEDURE — 99395 PREV VISIT EST AGE 18-39: CPT | Performed by: INTERNAL MEDICINE

## 2022-01-28 PROCEDURE — 3008F BODY MASS INDEX DOCD: CPT | Performed by: INTERNAL MEDICINE

## 2022-01-28 RX ORDER — FLUOXETINE 10 MG/1
10 CAPSULE ORAL DAILY
Qty: 30 CAPSULE | Refills: 5 | Status: SHIPPED | OUTPATIENT
Start: 2022-01-28 | End: 2022-08-24 | Stop reason: SDUPTHER

## 2022-01-28 RX ORDER — PHENTERMINE HYDROCHLORIDE 37.5 MG/1
1 TABLET ORAL DAILY
COMMUNITY
Start: 2021-12-14 | End: 2022-01-28

## 2022-01-28 RX ORDER — PHENTERMINE HYDROCHLORIDE 37.5 MG/1
37.5 CAPSULE ORAL EVERY MORNING
Qty: 30 CAPSULE | Refills: 2 | Status: SHIPPED | OUTPATIENT
Start: 2022-01-28 | End: 2022-11-09

## 2022-01-31 ENCOUNTER — TELEPHONE (OUTPATIENT)
Dept: INTERNAL MEDICINE | Facility: CLINIC | Age: 37
End: 2022-01-31

## 2022-01-31 DIAGNOSIS — E66.09 CLASS 2 OBESITY DUE TO EXCESS CALORIES WITHOUT SERIOUS COMORBIDITY WITH BODY MASS INDEX (BMI) OF 37.0 TO 37.9 IN ADULT: Primary | ICD-10-CM

## 2022-01-31 NOTE — TELEPHONE ENCOUNTER
I have added this. She should not get her labs done this morning. We need to have her labs checked as early in the morning as possible, ideally before 8am and no later than 9am.

## 2022-01-31 NOTE — TELEPHONE ENCOUNTER
PATIENT CALLED IN REQUESTING PROVIDER ADD A MAGNESIUM LEVEL TO HER LABS IF POSSIBLE  BEFORE SHE COMES TO GET THOSE DRAWN THIS MORNING     GOOD CALL BACK   [phone]

## 2022-02-01 ENCOUNTER — LAB (OUTPATIENT)
Dept: LAB | Facility: HOSPITAL | Age: 37
End: 2022-02-01

## 2022-02-01 DIAGNOSIS — Z00.01 ANNUAL VISIT FOR GENERAL ADULT MEDICAL EXAMINATION WITH ABNORMAL FINDINGS: ICD-10-CM

## 2022-02-01 DIAGNOSIS — E66.09 CLASS 2 OBESITY DUE TO EXCESS CALORIES WITHOUT SERIOUS COMORBIDITY WITH BODY MASS INDEX (BMI) OF 37.0 TO 37.9 IN ADULT: ICD-10-CM

## 2022-02-01 LAB
ALBUMIN SERPL-MCNC: 3.9 G/DL (ref 3.5–5.2)
ALBUMIN/GLOB SERPL: 1.5 G/DL
ALP SERPL-CCNC: 47 U/L (ref 39–117)
ALT SERPL W P-5'-P-CCNC: 24 U/L (ref 1–33)
ANION GAP SERPL CALCULATED.3IONS-SCNC: 9 MMOL/L (ref 5–15)
AST SERPL-CCNC: 22 U/L (ref 1–32)
BILIRUB SERPL-MCNC: 0.2 MG/DL (ref 0–1.2)
BUN SERPL-MCNC: 13 MG/DL (ref 6–20)
BUN/CREAT SERPL: 16.7 (ref 7–25)
CALCIUM SPEC-SCNC: 8.8 MG/DL (ref 8.6–10.5)
CHLORIDE SERPL-SCNC: 107 MMOL/L (ref 98–107)
CHOLEST SERPL-MCNC: 213 MG/DL (ref 0–200)
CO2 SERPL-SCNC: 26 MMOL/L (ref 22–29)
CORTIS SERPL-MCNC: 10.9 MCG/DL
CREAT SERPL-MCNC: 0.78 MG/DL (ref 0.57–1)
DEPRECATED RDW RBC AUTO: 43.2 FL (ref 37–54)
ERYTHROCYTE [DISTWIDTH] IN BLOOD BY AUTOMATED COUNT: 13.1 % (ref 12.3–15.4)
GFR SERPL CREATININE-BSD FRML MDRD: 82 ML/MIN/1.73
GLOBULIN UR ELPH-MCNC: 2.6 GM/DL
GLUCOSE SERPL-MCNC: 82 MG/DL (ref 65–99)
HBA1C MFR BLD: 5 % (ref 4.8–5.6)
HCT VFR BLD AUTO: 39.4 % (ref 34–46.6)
HDLC SERPL-MCNC: 66 MG/DL (ref 40–60)
HGB BLD-MCNC: 12.9 G/DL (ref 12–15.9)
LDLC SERPL CALC-MCNC: 135 MG/DL (ref 0–100)
LDLC/HDLC SERPL: 2.02 {RATIO}
MAGNESIUM SERPL-MCNC: 2.2 MG/DL (ref 1.6–2.6)
MCH RBC QN AUTO: 29.8 PG (ref 26.6–33)
MCHC RBC AUTO-ENTMCNC: 32.7 G/DL (ref 31.5–35.7)
MCV RBC AUTO: 91 FL (ref 79–97)
PLATELET # BLD AUTO: 361 10*3/MM3 (ref 140–450)
PMV BLD AUTO: 10.3 FL (ref 6–12)
POTASSIUM SERPL-SCNC: 4.1 MMOL/L (ref 3.5–5.2)
PROT SERPL-MCNC: 6.5 G/DL (ref 6–8.5)
RBC # BLD AUTO: 4.33 10*6/MM3 (ref 3.77–5.28)
SODIUM SERPL-SCNC: 142 MMOL/L (ref 136–145)
TRIGL SERPL-MCNC: 68 MG/DL (ref 0–150)
TSH SERPL DL<=0.05 MIU/L-ACNC: 3.34 UIU/ML (ref 0.27–4.2)
VLDLC SERPL-MCNC: 12 MG/DL (ref 5–40)
WBC NRBC COR # BLD: 5.23 10*3/MM3 (ref 3.4–10.8)

## 2022-02-01 PROCEDURE — 82533 TOTAL CORTISOL: CPT

## 2022-02-01 PROCEDURE — 36415 COLL VENOUS BLD VENIPUNCTURE: CPT

## 2022-02-01 PROCEDURE — 80061 LIPID PANEL: CPT

## 2022-02-01 PROCEDURE — 83036 HEMOGLOBIN GLYCOSYLATED A1C: CPT

## 2022-02-01 PROCEDURE — 80050 GENERAL HEALTH PANEL: CPT

## 2022-02-01 PROCEDURE — 83735 ASSAY OF MAGNESIUM: CPT

## 2022-05-27 DIAGNOSIS — E66.09 CLASS 2 OBESITY DUE TO EXCESS CALORIES WITHOUT SERIOUS COMORBIDITY WITH BODY MASS INDEX (BMI) OF 37.0 TO 37.9 IN ADULT: ICD-10-CM

## 2022-05-27 RX ORDER — PHENTERMINE HYDROCHLORIDE 37.5 MG/1
37.5 TABLET ORAL EVERY MORNING
Qty: 30 TABLET | Refills: 2 | OUTPATIENT
Start: 2022-05-27

## 2022-08-24 ENCOUNTER — HOSPITAL ENCOUNTER (OUTPATIENT)
Dept: GENERAL RADIOLOGY | Facility: HOSPITAL | Age: 37
Discharge: HOME | End: 2022-08-24
Admitting: NURSE PRACTITIONER

## 2022-08-24 ENCOUNTER — OFFICE VISIT (OUTPATIENT)
Dept: INTERNAL MEDICINE | Facility: CLINIC | Age: 37
End: 2022-08-24

## 2022-08-24 VITALS
OXYGEN SATURATION: 99 % | HEIGHT: 61 IN | RESPIRATION RATE: 16 BRPM | DIASTOLIC BLOOD PRESSURE: 88 MMHG | HEART RATE: 81 BPM | BODY MASS INDEX: 36.25 KG/M2 | SYSTOLIC BLOOD PRESSURE: 126 MMHG | WEIGHT: 192 LBS | TEMPERATURE: 97.5 F

## 2022-08-24 DIAGNOSIS — F32.A ANXIETY AND DEPRESSION: ICD-10-CM

## 2022-08-24 DIAGNOSIS — E66.09 CLASS 2 OBESITY DUE TO EXCESS CALORIES WITHOUT SERIOUS COMORBIDITY WITH BODY MASS INDEX (BMI) OF 36.0 TO 36.9 IN ADULT: ICD-10-CM

## 2022-08-24 DIAGNOSIS — J45.909 ASTHMA DUE TO SEASONAL ALLERGIES: ICD-10-CM

## 2022-08-24 DIAGNOSIS — F41.9 ANXIETY AND DEPRESSION: ICD-10-CM

## 2022-08-24 DIAGNOSIS — S69.92XA HAND INJURY, LEFT, INITIAL ENCOUNTER: ICD-10-CM

## 2022-08-24 DIAGNOSIS — S69.92XA HAND INJURY, LEFT, INITIAL ENCOUNTER: Primary | ICD-10-CM

## 2022-08-24 PROCEDURE — 99214 OFFICE O/P EST MOD 30 MIN: CPT | Performed by: NURSE PRACTITIONER

## 2022-08-24 PROCEDURE — 73130 X-RAY EXAM OF HAND: CPT

## 2022-08-24 RX ORDER — LORATADINE 10 MG/1
10 TABLET ORAL DAILY
Qty: 30 TABLET | Refills: 10 | Status: SHIPPED | OUTPATIENT
Start: 2022-08-24

## 2022-08-24 RX ORDER — FLUOXETINE 10 MG/1
10 CAPSULE ORAL DAILY
Qty: 30 CAPSULE | Refills: 5 | Status: SHIPPED | OUTPATIENT
Start: 2022-08-24

## 2022-08-24 RX ORDER — PHENTERMINE HYDROCHLORIDE 37.5 MG/1
37.5 CAPSULE ORAL EVERY MORNING
Qty: 30 CAPSULE | Refills: 2 | Status: CANCELLED | OUTPATIENT
Start: 2022-08-24

## 2022-08-24 NOTE — PROGRESS NOTES
"Chief Complaint   Patient presents with   • Hand Injury     Left hand        HPI     Kristen Christie is a 37 y.o. female presents for the above problem. Her son was pitching and she was catching for him when the ball hit her left hand on Saturday. Swelling and bruising has improved however she does have an area of edema proximal to MCP joint of left index finger.          ROS:  Review of Systems   Musculoskeletal: Positive for arthralgias (tenderness to inner aspect of index finger and thumb and near MCP joint of index finger) and joint swelling.          reports that she quit smoking about 10 years ago. She quit after 10.00 years of use. She has never used smokeless tobacco. She reports previous alcohol use. She reports that she does not use drugs.    Current Outpatient Medications:   •  albuterol sulfate  (90 Base) MCG/ACT inhaler, Inhale 2 puffs Every 6 (Six) Hours As Needed for Wheezing or Shortness of Air (cough)., Disp: 18 g, Rfl: 0  •  busPIRone (BUSPAR) 10 MG tablet, Take 5 mg by mouth Daily., Disp: , Rfl:   •  FLUoxetine (PROzac) 10 MG capsule, Take 1 capsule by mouth Daily., Disp: 30 capsule, Rfl: 5  •  loratadine (CLARITIN) 10 MG tablet, Take 1 tablet by mouth Daily., Disp: 30 tablet, Rfl: 10  •  phentermine 37.5 MG capsule, Take 1 capsule by mouth Every Morning., Disp: 30 capsule, Rfl: 2    OBJECTIVE:  /88 (BP Location: Left arm, Patient Position: Sitting, Cuff Size: Adult)   Pulse 81   Temp 97.5 °F (36.4 °C) (Temporal)   Resp 16   Ht 154.9 cm (61\")   Wt 87.1 kg (192 lb)   SpO2 99%   BMI 36.28 kg/m²    Physical Exam  Constitutional:       General: She is not in acute distress.  Cardiovascular:      Rate and Rhythm: Normal rate and regular rhythm.      Pulses: Normal pulses.      Heart sounds: Normal heart sounds.   Musculoskeletal:         General: Normal range of motion.      Left hand: Swelling and tenderness present.        Hands:          ASSESSMENT/PLAN:     Diagnoses and all orders " for this visit:    1. Hand injury, left, initial encounter (Primary)  -     XR Hand 3+ View Left; Future  Edema has improved since injury, however given appearance and location of edema will obtain x-ray. Advised to continue ice, rest, and NSAIDs as needed.     2. Class 2 obesity due to excess calories without serious comorbidity with body mass index (BMI) of 36.0 to 36.9 in adult  -     Ambulatory Referral to Bariatric Surgery  Requested refill of phentermine today. Discussed other options including meeting with a specialist and dietician for weight loss management. She is active at work and with family but does not have a lot of time to exercise regularly.   Class 2 Severe Obesity (BMI >=35 and <=39.9). Obesity-related health conditions include the following: hypertension and diabetes mellitus. Obesity is unchanged. BMI is is above average; BMI management plan is completed. We discussed portion control and increasing exercise.     3. Asthma due to seasonal allergies  -     loratadine (CLARITIN) 10 MG tablet; Take 1 tablet by mouth Daily.  Dispense: 30 tablet; Refill: 10    4. Anxiety and depression  -     FLUoxetine (PROzac) 10 MG capsule; Take 1 capsule by mouth Daily.  Dispense: 30 capsule; Refill: 5    An After Visit Summary was printed and given to the patient at discharge.  Return in 5 months (on 1/24/2023) for Annual physical with Dr. Jones .          REMI Early 8/24/2022   Electronically signed.

## 2022-10-19 ENCOUNTER — OFFICE VISIT (OUTPATIENT)
Dept: OBSTETRICS AND GYNECOLOGY | Facility: CLINIC | Age: 37
End: 2022-10-19

## 2022-10-19 VITALS
HEIGHT: 61 IN | SYSTOLIC BLOOD PRESSURE: 126 MMHG | DIASTOLIC BLOOD PRESSURE: 82 MMHG | WEIGHT: 185 LBS | BODY MASS INDEX: 34.93 KG/M2

## 2022-10-19 DIAGNOSIS — E66.9 CLASS 2 OBESITY WITHOUT SERIOUS COMORBIDITY WITH BODY MASS INDEX (BMI) OF 35.0 TO 35.9 IN ADULT, UNSPECIFIED OBESITY TYPE: ICD-10-CM

## 2022-10-19 DIAGNOSIS — Z01.419 WELL WOMAN EXAM WITH ROUTINE GYNECOLOGICAL EXAM: Primary | ICD-10-CM

## 2022-10-19 DIAGNOSIS — Z12.31 ENCOUNTER FOR SCREENING MAMMOGRAM FOR MALIGNANT NEOPLASM OF BREAST: ICD-10-CM

## 2022-10-19 DIAGNOSIS — Z12.4 CERVICAL CANCER SCREENING: ICD-10-CM

## 2022-10-19 PROCEDURE — 2014F MENTAL STATUS ASSESS: CPT | Performed by: NURSE PRACTITIONER

## 2022-10-19 PROCEDURE — G0123 SCREEN CERV/VAG THIN LAYER: HCPCS | Performed by: NURSE PRACTITIONER

## 2022-10-19 PROCEDURE — 87624 HPV HI-RISK TYP POOLED RSLT: CPT | Performed by: NURSE PRACTITIONER

## 2022-10-19 PROCEDURE — 99395 PREV VISIT EST AGE 18-39: CPT | Performed by: NURSE PRACTITIONER

## 2022-10-19 PROCEDURE — 99213 OFFICE O/P EST LOW 20 MIN: CPT | Performed by: NURSE PRACTITIONER

## 2022-10-19 PROCEDURE — 3008F BODY MASS INDEX DOCD: CPT | Performed by: NURSE PRACTITIONER

## 2022-10-19 NOTE — PROGRESS NOTES
Subjective   Kristen Christie is a 37 y.o. female  YOB: 1985        Chief Complaint   Patient presents with   • Gynecologic Exam     Pt here for annual, last pap 10/18/21 normal. Pt denies any problems or concerns.        Gynecologic Exam  The patient's pertinent negatives include no pelvic pain. Pertinent negatives include no abdominal pain, back pain, constipation, diarrhea, dysuria, fever, frequency, hematuria, nausea, rash, sore throat, urgency or vomiting.       The following portions of the patient's history were reviewed and updated as appropriate: allergies, current medications, past family history, past medical history, past social history, past surgical history, and problem list.    No Known Allergies    Past Medical History:   Diagnosis Date   • Abnormal Pap smear of cervix    • Anxiety    • Arthritis    • Asthma    • Cervical dysplasia    • COVID-19 virus infection 07/2021    again in 1/22   • Depression    • Seasonal allergies        Family History   Problem Relation Age of Onset   • Diabetes Father    • Hypertension Father    • Diabetes Paternal Grandmother    • Hypertension Paternal Grandmother    • Stroke Paternal Grandmother    • Stroke Mother    • Hypertension Sister    • Stroke Maternal Grandmother    • Breast cancer Maternal Grandmother    • Stroke Maternal Grandfather    • Hypertension Brother    • Ovarian cancer Maternal Great-Grandmother         age unknown   • Uterine cancer Neg Hx    • Colon cancer Neg Hx    • Melanoma Neg Hx    • Prostate cancer Neg Hx        Social History     Socioeconomic History   • Marital status:    Tobacco Use   • Smoking status: Former     Years: 10.00     Types: Cigarettes     Quit date: 1/6/2012     Years since quitting: 10.7   • Smokeless tobacco: Never   Vaping Use   • Vaping Use: Never used   Substance and Sexual Activity   • Alcohol use: Not Currently     Comment: occ   • Drug use: No   • Sexual activity: Not Currently     Partners: Male      Birth control/protection: None         Current Outpatient Medications:   •  albuterol sulfate  (90 Base) MCG/ACT inhaler, Inhale 2 puffs Every 6 (Six) Hours As Needed for Wheezing or Shortness of Air (cough)., Disp: 18 g, Rfl: 0  •  busPIRone (BUSPAR) 10 MG tablet, Take 5 mg by mouth Daily., Disp: , Rfl:   •  FLUoxetine (PROzac) 10 MG capsule, Take 1 capsule by mouth Daily., Disp: 30 capsule, Rfl: 5  •  loratadine (CLARITIN) 10 MG tablet, Take 1 tablet by mouth Daily., Disp: 30 tablet, Rfl: 10  •  phentermine 37.5 MG capsule, Take 1 capsule by mouth Every Morning., Disp: 30 capsule, Rfl: 2    Patient's last menstrual period was 10/03/2022.    Sexual History:           Could not be calculated    Past Surgical History:   Procedure Laterality Date   • COLPOSCOPY     • LEEP  2015   • TONSILLECTOMY  2004       Review of Systems   Constitutional: Negative for activity change, appetite change, fatigue, fever, unexpected weight gain and unexpected weight loss.   HENT: Negative for congestion, ear pain, hearing loss, nosebleeds, rhinorrhea, sore throat, tinnitus and trouble swallowing.    Eyes: Negative for blurred vision, pain, discharge, itching and visual disturbance.   Respiratory: Negative for apnea, chest tightness, shortness of breath and wheezing.    Cardiovascular: Negative for chest pain and leg swelling.   Gastrointestinal: Negative for abdominal pain, blood in stool, constipation, diarrhea, nausea, vomiting and GERD.   Endocrine: Negative for heat intolerance, polydipsia and polyuria.   Genitourinary: Negative.  Negative for breast lump, decreased libido, difficulty urinating, dyspareunia, dysuria, frequency, genital sores, hematuria, menstrual problem, pelvic pain, urgency, urinary incontinence and vaginal pain.   Musculoskeletal: Negative for arthralgias, back pain, joint swelling and myalgias.   Skin: Negative for color change, rash and skin lesions.   Allergic/Immunologic: Negative for  environmental allergies, food allergies and immunocompromised state.   Neurological: Negative for dizziness, tremors, seizures, syncope, facial asymmetry, numbness and headache.   Hematological: Negative for adenopathy. Does not bruise/bleed easily.   Psychiatric/Behavioral: Negative for agitation, hallucinations, sleep disturbance, suicidal ideas and depressed mood. The patient is not nervous/anxious.        Objective   Physical Exam  Vitals and nursing note reviewed. Exam conducted with a chaperone present.   Constitutional:       General: She is not in acute distress.     Appearance: She is well-developed. She is not diaphoretic.   HENT:      Head: Normocephalic.      Right Ear: External ear normal.      Left Ear: External ear normal.      Nose: Nose normal.   Eyes:      General: No scleral icterus.        Right eye: No discharge.         Left eye: No discharge.      Conjunctiva/sclera: Conjunctivae normal.      Pupils: Pupils are equal, round, and reactive to light.   Neck:      Thyroid: No thyromegaly.      Vascular: No carotid bruit.      Trachea: No tracheal deviation.   Cardiovascular:      Rate and Rhythm: Normal rate and regular rhythm.      Heart sounds: Normal heart sounds. No murmur heard.  Pulmonary:      Effort: Pulmonary effort is normal. No respiratory distress.      Breath sounds: Normal breath sounds. No wheezing.   Chest:   Breasts:     Breasts are symmetrical.      Right: Normal. No swelling, bleeding, inverted nipple, mass, nipple discharge, skin change or tenderness.      Left: Normal. No swelling, bleeding, inverted nipple, mass, nipple discharge, skin change or tenderness.   Abdominal:      General: There is no distension.      Palpations: Abdomen is soft. There is no mass.      Tenderness: There is no abdominal tenderness. There is no right CVA tenderness, left CVA tenderness or guarding.      Hernia: No hernia is present. There is no hernia in the left inguinal area or right inguinal area.    Genitourinary:     General: Normal vulva.      Exam position: Lithotomy position.      Labia:         Right: No rash, tenderness, lesion or injury.         Left: No rash, tenderness, lesion or injury.       Vagina: Normal. No signs of injury and foreign body. No vaginal discharge, erythema, tenderness or bleeding.      Cervix: Normal.      Uterus: Normal. Not enlarged, not fixed and not tender.       Adnexa: Right adnexa normal and left adnexa normal.        Right: No mass, tenderness or fullness.          Left: No mass, tenderness or fullness.        Rectum: Normal. No mass.      Comments:   BSU normal  Urethral meatus  Normal  Perineum  Normal  Musculoskeletal:         General: No tenderness. Normal range of motion.      Cervical back: Normal range of motion and neck supple.   Lymphadenopathy:      Head:      Right side of head: No submental, submandibular, tonsillar, preauricular, posterior auricular or occipital adenopathy.      Left side of head: No submental, submandibular, tonsillar, preauricular, posterior auricular or occipital adenopathy.      Cervical: No cervical adenopathy.      Right cervical: No superficial, deep or posterior cervical adenopathy.     Left cervical: No superficial, deep or posterior cervical adenopathy.      Upper Body:      Right upper body: No supraclavicular, axillary or pectoral adenopathy.      Left upper body: No supraclavicular, axillary or pectoral adenopathy.      Lower Body: No right inguinal adenopathy. No left inguinal adenopathy.   Skin:     General: Skin is warm and dry.      Findings: No bruising, erythema or rash.   Neurological:      Mental Status: She is alert and oriented to person, place, and time.      Coordination: Coordination normal.   Psychiatric:         Mood and Affect: Mood normal.         Behavior: Behavior normal.         Thought Content: Thought content normal.         Judgment: Judgment normal.           Vitals:    10/19/22 0947   BP: 126/82   BP  "Location: Left arm   Patient Position: Sitting   Cuff Size: Large Adult   Weight: 83.9 kg (185 lb)   Height: 154.9 cm (60.98\")       Diagnoses and all orders for this visit:    1. Well woman exam with routine gynecological exam (Primary)  Comments:  Normal well woman exam.  ThinPrep Pap smear done.  Baseline mammogram ordered.    2. Cervical cancer screening  Comments:  ThinPrep Pap smear done.    3. Encounter for screening mammogram for malignant neoplasm of breast  Comments:  Baseline mammogram ordered.  Orders:  -     Mammo Screening Digital Tomosynthesis Bilateral With CAD; Future    4. Class 2 obesity without serious comorbidity with body mass index (BMI) of 35.0 to 35.9 in adult, unspecified obesity type  Comments:  Patient is currently on phentermine that she got from medical weight loss but would like to switch here.  She has 2 weeks worth of phentermine left.  Appointment made in 2 weeks.        Normal GYN exam. Will have lab work here. Encouraged SBE.  Pt is aware how to do self breast exam and the importance of same. Discussed weight management and importance of maintaining a healthy weight. Discussed Vitamin D intake and the importance of adequate vitamin D for both bone health and a healthy immune system.  Discussed daily exercise and the importance of same in regards to a healthy heart as well as helping to maintain her weight and improving her mental health.  Body mass index is 34.97 kg/m². Colonoscopy is not age appropriate.  Mammogram will be scheduled at [facility]. Pap smear is done per ASCCP guidelines.                  Non-Smoker    MyChart Instructions Given       "

## 2022-10-21 DIAGNOSIS — E66.09 CLASS 2 OBESITY DUE TO EXCESS CALORIES WITHOUT SERIOUS COMORBIDITY WITH BODY MASS INDEX (BMI) OF 37.0 TO 37.9 IN ADULT: Primary | ICD-10-CM

## 2022-10-21 LAB
GEN CATEG CVX/VAG CYTO-IMP: NORMAL
HPV I/H RISK 4 DNA CVX QL PROBE+SIG AMP: NOT DETECTED
LAB AP CASE REPORT: NORMAL
LAB AP GYN ADDITIONAL INFORMATION: NORMAL
LAB AP GYN OTHER FINDINGS: NORMAL
Lab: NORMAL
PATH INTERP SPEC-IMP: NORMAL
STAT OF ADQ CVX/VAG CYTO-IMP: NORMAL

## 2022-10-24 ENCOUNTER — TELEPHONE (OUTPATIENT)
Dept: OBSTETRICS AND GYNECOLOGY | Facility: CLINIC | Age: 37
End: 2022-10-24

## 2022-10-24 NOTE — TELEPHONE ENCOUNTER
Pt seen 10/19/22.  Pt called today asking about a Zpack she thought was being sent to her pharmacy.  Pt is c/o sinus infection symptoms.  Do you want pt to call her PCP?

## 2022-10-25 ENCOUNTER — LAB (OUTPATIENT)
Dept: LAB | Facility: HOSPITAL | Age: 37
End: 2022-10-25

## 2022-10-25 ENCOUNTER — OFFICE VISIT (OUTPATIENT)
Dept: INTERNAL MEDICINE | Facility: CLINIC | Age: 37
End: 2022-10-25

## 2022-10-25 VITALS
HEART RATE: 75 BPM | HEIGHT: 61 IN | TEMPERATURE: 97.5 F | DIASTOLIC BLOOD PRESSURE: 76 MMHG | BODY MASS INDEX: 35.3 KG/M2 | WEIGHT: 187 LBS | OXYGEN SATURATION: 100 % | SYSTOLIC BLOOD PRESSURE: 104 MMHG | RESPIRATION RATE: 16 BRPM

## 2022-10-25 DIAGNOSIS — R10.9 FLANK PAIN: ICD-10-CM

## 2022-10-25 DIAGNOSIS — J45.909 ASTHMA, UNSPECIFIED ASTHMA SEVERITY, UNSPECIFIED WHETHER COMPLICATED, UNSPECIFIED WHETHER PERSISTENT: ICD-10-CM

## 2022-10-25 DIAGNOSIS — J01.10 ACUTE NON-RECURRENT FRONTAL SINUSITIS: Primary | ICD-10-CM

## 2022-10-25 DIAGNOSIS — R05.1 ACUTE COUGH: ICD-10-CM

## 2022-10-25 LAB — SARS-COV-2 RNA RESP QL NAA+PROBE: NOT DETECTED

## 2022-10-25 PROCEDURE — 81003 URINALYSIS AUTO W/O SCOPE: CPT

## 2022-10-25 PROCEDURE — 99213 OFFICE O/P EST LOW 20 MIN: CPT | Performed by: NURSE PRACTITIONER

## 2022-10-25 PROCEDURE — U0003 INFECTIOUS AGENT DETECTION BY NUCLEIC ACID (DNA OR RNA); SEVERE ACUTE RESPIRATORY SYNDROME CORONAVIRUS 2 (SARS-COV-2) (CORONAVIRUS DISEASE [COVID-19]), AMPLIFIED PROBE TECHNIQUE, MAKING USE OF HIGH THROUGHPUT TECHNOLOGIES AS DESCRIBED BY CMS-2020-01-R: HCPCS

## 2022-10-25 RX ORDER — METHYLPREDNISOLONE 4 MG/1
TABLET ORAL
Qty: 21 TABLET | Refills: 0 | Status: SHIPPED | OUTPATIENT
Start: 2022-10-25 | End: 2022-11-09

## 2022-10-25 RX ORDER — AMOXICILLIN AND CLAVULANATE POTASSIUM 875; 125 MG/1; MG/1
1 TABLET, FILM COATED ORAL 2 TIMES DAILY
Qty: 14 TABLET | Refills: 0 | Status: SHIPPED | OUTPATIENT
Start: 2022-10-25 | End: 2022-11-01

## 2022-10-25 RX ORDER — ALBUTEROL SULFATE 90 UG/1
2 AEROSOL, METERED RESPIRATORY (INHALATION) EVERY 6 HOURS PRN
Qty: 18 G | Refills: 0 | Status: SHIPPED | OUTPATIENT
Start: 2022-10-25

## 2022-10-25 NOTE — PROGRESS NOTES
Chief Complaint   Patient presents with   • Sinus Problem   • Cough     Non-productive   • URI     Chest congestion        HPI     Kristen Christie is a 37 y.o. female presents for the above problem. Symptoms have been present for over a week.  She has sinus pressure and chills. She describes a dry cough and shortness of breath. She does have asthma and uses the albuterol inhaler as needed. She also mentions pain to right flank.          ROS:  Review of Systems   Constitutional: Positive for chills and fatigue. Negative for fever.   HENT: Positive for congestion, ear pain, sinus pressure, sinus pain and voice change. Negative for trouble swallowing.    Respiratory: Positive for cough and shortness of breath.           reports that she quit smoking about 10 years ago. Her smoking use included cigarettes. She has never used smokeless tobacco. She reports that she does not currently use alcohol. She reports that she does not use drugs.    Current Outpatient Medications:   •  albuterol sulfate  (90 Base) MCG/ACT inhaler, Inhale 2 puffs Every 6 (Six) Hours As Needed for Wheezing or Shortness of Air (cough)., Disp: 18 g, Rfl: 0  •  busPIRone (BUSPAR) 10 MG tablet, Take 5 mg by mouth Daily., Disp: , Rfl:   •  FLUoxetine (PROzac) 10 MG capsule, Take 1 capsule by mouth Daily., Disp: 30 capsule, Rfl: 5  •  loratadine (CLARITIN) 10 MG tablet, Take 1 tablet by mouth Daily., Disp: 30 tablet, Rfl: 10  •  phentermine 37.5 MG capsule, Take 1 capsule by mouth Every Morning., Disp: 30 capsule, Rfl: 2  •  amoxicillin-clavulanate (Augmentin) 875-125 MG per tablet, Take 1 tablet by mouth 2 (Two) Times a Day for 7 days., Disp: 14 tablet, Rfl: 0  •  methylPREDNISolone (MEDROL) 4 MG dose pack, Take as directed on package instructions., Disp: 21 tablet, Rfl: 0    OBJECTIVE:  /76 (BP Location: Left arm, Patient Position: Sitting, Cuff Size: Large Adult)   Pulse 75   Temp 97.5 °F (36.4 °C) (Temporal)   Resp 16   Ht 154.9 cm  "(60.98\")   Wt 84.8 kg (187 lb)   LMP 10/03/2022   SpO2 100%   BMI 35.36 kg/m²    Physical Exam  Constitutional:       General: She is not in acute distress.  HENT:      Right Ear: Tympanic membrane normal.      Left Ear: Tympanic membrane normal.      Nose:      Right Sinus: Frontal sinus tenderness present.      Left Sinus: Frontal sinus tenderness present.      Mouth/Throat:      Mouth: Mucous membranes are moist.      Pharynx: Posterior oropharyngeal erythema present.   Cardiovascular:      Rate and Rhythm: Normal rate and regular rhythm.      Heart sounds: Normal heart sounds.   Pulmonary:      Effort: Pulmonary effort is normal.      Breath sounds: Wheezing present.   Abdominal:      Tenderness: There is right CVA tenderness.         ASSESSMENT/PLAN:     Diagnoses and all orders for this visit:    1. Acute non-recurrent frontal sinusitis (Primary)  -     amoxicillin-clavulanate (Augmentin) 875-125 MG per tablet; Take 1 tablet by mouth 2 (Two) Times a Day for 7 days.  Dispense: 14 tablet; Refill: 0  Frontal sinuses very tender with palpation. Augmentin to treat both upper and lower respiratory pathogens.     2. Asthma, unspecified asthma severity, unspecified whether complicated, unspecified whether persistent  -     albuterol sulfate  (90 Base) MCG/ACT inhaler; Inhale 2 puffs Every 6 (Six) Hours As Needed for Wheezing or Shortness of Air (cough).  Dispense: 18 g; Refill: 0  -     methylPREDNISolone (MEDROL) 4 MG dose pack; Take as directed on package instructions.  Dispense: 21 tablet; Refill: 0    3. Acute cough  -     COVID-19,CEPHEID/RHIANNA,COR/JUMA/PAD/CHENTE/MAD IN-HOUSE(OR EMERGENT/ADD-ON),NP SWAB IN TRANSPORT MEDIA 3-4 HR TAT, RT-PCR - Swab, Nasopharynx; Future    4. Flank pain  -     Urinalysis With Culture If Indicated - Urine, Clean Catch; Future  Pain with palpation to right flank. Possibly muscle strain related to cough, will obtain urine specimen to rule out infection.           An After Visit " Summary was printed and given to the patient at discharge.  Return if symptoms worsen or fail to improve.          REMI Early 10/25/2022   Electronically signed.

## 2022-10-26 LAB
BILIRUB UR QL STRIP: NEGATIVE
CLARITY UR: CLEAR
COLOR UR: YELLOW
GLUCOSE UR STRIP-MCNC: NEGATIVE MG/DL
HGB UR QL STRIP.AUTO: NEGATIVE
KETONES UR QL STRIP: NEGATIVE
LEUKOCYTE ESTERASE UR QL STRIP.AUTO: NEGATIVE
NITRITE UR QL STRIP: NEGATIVE
PH UR STRIP.AUTO: 6.5 [PH] (ref 5–8)
PROT UR QL STRIP: NEGATIVE
SP GR UR STRIP: 1.01 (ref 1–1.03)
UROBILINOGEN UR QL STRIP: NORMAL

## 2022-10-31 ENCOUNTER — TELEPHONE (OUTPATIENT)
Dept: INTERNAL MEDICINE | Facility: CLINIC | Age: 37
End: 2022-10-31

## 2022-10-31 NOTE — TELEPHONE ENCOUNTER
Caller: Kristen Christie    Relationship to patient: Self    Best call back number: [phone] (home)       Patient is needing: pt said that since her visit her sinus symptoms have improved. She said at the time of her visit she was experiencing back pain, but now it has gotten much worse - she said that it is a constant pain between her shoulders and worsens if taking a deep breath or coughing.     Pt is asking how to proceed and would like a call back.

## 2022-10-31 NOTE — TELEPHONE ENCOUNTER
She very likely has muscle strain there related to her recent coughing. Recommend she stretch her neck and shoulders to promote movement of those muscles. If not improving, we can consider next steps. Ibuprofen can assist as well and if not able to rest well, we can consider a muscle relaxer. Overall, I will expect this to improve on its own, but if it is not, we have further options.

## 2022-11-01 NOTE — TELEPHONE ENCOUNTER
Patient informed of Dr. Jones's message.  She said she does feel a little better today and she has not been coughing much.  She is already taking ibuprofen and does not want a muscle relaxer to be prescribed.  She said she will call the office if things do not continue to improve.

## 2022-11-02 DIAGNOSIS — Z80.3 FAMILY HISTORY OF BREAST CANCER: Primary | ICD-10-CM

## 2022-11-03 ENCOUNTER — HOSPITAL ENCOUNTER (OUTPATIENT)
Dept: MAMMOGRAPHY | Facility: HOSPITAL | Age: 37
Discharge: HOME | End: 2022-11-03
Admitting: NURSE PRACTITIONER

## 2022-11-03 DIAGNOSIS — Z12.31 ENCOUNTER FOR SCREENING MAMMOGRAM FOR MALIGNANT NEOPLASM OF BREAST: ICD-10-CM

## 2022-11-03 PROCEDURE — 77063 BREAST TOMOSYNTHESIS BI: CPT

## 2022-11-03 PROCEDURE — 77067 SCR MAMMO BI INCL CAD: CPT

## 2022-11-09 ENCOUNTER — OFFICE VISIT (OUTPATIENT)
Dept: OBSTETRICS AND GYNECOLOGY | Facility: CLINIC | Age: 37
End: 2022-11-09

## 2022-11-09 ENCOUNTER — LAB (OUTPATIENT)
Dept: LAB | Facility: HOSPITAL | Age: 37
End: 2022-11-09

## 2022-11-09 VITALS
DIASTOLIC BLOOD PRESSURE: 72 MMHG | HEIGHT: 61 IN | BODY MASS INDEX: 35.87 KG/M2 | SYSTOLIC BLOOD PRESSURE: 108 MMHG | WEIGHT: 190 LBS

## 2022-11-09 DIAGNOSIS — Z79.899 MEDICATION MANAGEMENT: ICD-10-CM

## 2022-11-09 DIAGNOSIS — E66.9 OBESITY (BMI 30-39.9): Primary | ICD-10-CM

## 2022-11-09 DIAGNOSIS — Z80.3 FAMILY HISTORY OF BREAST CANCER: ICD-10-CM

## 2022-11-09 PROCEDURE — 99213 OFFICE O/P EST LOW 20 MIN: CPT | Performed by: NURSE PRACTITIONER

## 2022-11-09 RX ORDER — PHENTERMINE HYDROCHLORIDE 37.5 MG/1
37.5 TABLET ORAL
Qty: 30 TABLET | Refills: 0 | Status: SHIPPED | OUTPATIENT
Start: 2022-11-09 | End: 2022-12-15 | Stop reason: SDUPTHER

## 2022-11-09 NOTE — PROGRESS NOTES
"Chief Complaint  Med Management (Pt is here for a f/u on her Phentermine.  Pt had been getting this medication from [facility] and Rosario told patient she could start getting it here. )    Subjective          Kristen Christie presents to [facility]  History of Present Illness  Pt desires to restart phentermine for wt loss      Review of Systems   Constitutional: Negative for activity change, appetite change, fatigue and fever.        Pt reports difficulty achieving wt loss   Respiratory: Negative for apnea and shortness of breath.    Cardiovascular: Negative for chest pain and palpitations.   Gastrointestinal: Negative for abdominal distention, abdominal pain, constipation, diarrhea, nausea and vomiting.   Endocrine: Negative for cold intolerance and heat intolerance.   Genitourinary: Negative for difficulty urinating, frequency, menstrual problem, pelvic pain, vaginal discharge and vaginal pain.   Neurological: Negative for headaches.   Psychiatric/Behavioral: Negative for agitation and sleep disturbance.         Objective   Vital Signs:   /72   Ht 154.9 cm (61\")   Wt 86.2 kg (190 lb)   BMI 35.90 kg/m²     Physical Exam  Vitals reviewed.   Constitutional:       Appearance: She is well-developed.   Eyes:      General:         Right eye: No discharge.         Left eye: No discharge.   Cardiovascular:      Rate and Rhythm: Normal rate and regular rhythm.   Pulmonary:      Effort: Pulmonary effort is normal.      Breath sounds: Normal breath sounds.   Skin:     General: Skin is warm.   Neurological:      Mental Status: She is alert and oriented to person, place, and time.   Psychiatric:         Behavior: Behavior normal.         Thought Content: Thought content normal.         Judgment: Judgment normal.         Result Review :                       Assessment and Plan     Discussed options for wt management.   Discussed pt diet and activity.   Pt opts to restart phentermine.   BORIS " obtained and reviewed.   Pt advised to call with any questions or concerns.   Discussed S&S to report. Pt voiced understanding.     Diagnoses and all orders for this visit:    1. Obesity (BMI 30-39.9) (Primary)  -     phentermine (ADIPEX-P) 37.5 MG tablet; Take 1 tablet by mouth Every Morning Before Breakfast.  Dispense: 30 tablet; Refill: 0    2. Medication management          Class 2 Severe Obesity (BMI >=35 and <=39.9). Obesity-related health conditions include the following: none. Obesity is worsening. BMI is is above average; no BMI management plan is appropriate. We discussed portion control and increasing exercise.       Follow Up   Return for med check.    Patient was given instructions and counseling regarding her condition or for health maintenance advice. Please see specific information pulled into the AVS if appropriate.

## 2022-11-30 NOTE — PATIENT INSTRUCTIONS

## 2022-12-13 ENCOUNTER — TELEPHONE (OUTPATIENT)
Dept: OBSTETRICS AND GYNECOLOGY | Facility: CLINIC | Age: 37
End: 2022-12-13

## 2022-12-13 NOTE — TELEPHONE ENCOUNTER
Caller: Kristen Christie    Relationship: Self    Best call back number: [phone]    What is the best time to reach you: ANYTIME    What was the call regarding: PT WOULD LIKE TO KNOW IF HER APPT THIS MORNING COULD BE CHANGED TO A VIRTUAL VISIT.     Do you require a callback: YES

## 2022-12-13 NOTE — TELEPHONE ENCOUNTER
Pt called wanting to change appt to telehealth.  I asked MA and was told to try to move to Thursday for Yani's telehelath day.  I called pt and LM that we could fit her in on Thursday if needed.

## 2022-12-15 ENCOUNTER — APPOINTMENT (OUTPATIENT)
Dept: NUTRITION | Facility: HOSPITAL | Age: 37
End: 2022-12-15

## 2022-12-15 ENCOUNTER — TELEMEDICINE (OUTPATIENT)
Dept: OBSTETRICS AND GYNECOLOGY | Facility: CLINIC | Age: 37
End: 2022-12-15
Payer: COMMERCIAL

## 2022-12-15 DIAGNOSIS — E66.9 OBESITY (BMI 30-39.9): ICD-10-CM

## 2022-12-15 PROCEDURE — 1159F MED LIST DOCD IN RCRD: CPT | Performed by: NURSE PRACTITIONER

## 2022-12-15 PROCEDURE — 99213 OFFICE O/P EST LOW 20 MIN: CPT | Performed by: NURSE PRACTITIONER

## 2022-12-15 PROCEDURE — 1160F RVW MEDS BY RX/DR IN RCRD: CPT | Performed by: NURSE PRACTITIONER

## 2022-12-15 RX ORDER — PHENTERMINE HYDROCHLORIDE 37.5 MG/1
37.5 TABLET ORAL
Qty: 30 TABLET | Refills: 0 | Status: SHIPPED | OUTPATIENT
Start: 2022-12-15 | End: 2023-01-18 | Stop reason: SDUPTHER

## 2022-12-15 NOTE — PROGRESS NOTES
Mode of Visit: Video  Location of patient: home  You have chosen to receive care through a telehealth visit.  The patient has signed the video visit consent form.  The visit included audio and video interaction. Difficulty maintaining connection occurred.   The visit was continued with audio alone.      Chief Complaint  Follow-up (Medication management/)    Subjective          Kristen Christie presents to [facility]  History of Present Illness  Review of Systems   Constitutional: Negative for activity change, appetite change, fatigue and fever.        Increasing health foods in diet     Respiratory: Negative for apnea and shortness of breath.    Cardiovascular: Negative for chest pain and palpitations.   Gastrointestinal: Negative for abdominal distention, abdominal pain, constipation, diarrhea, nausea and vomiting.   Endocrine: Negative for cold intolerance and heat intolerance.   Genitourinary: Negative for difficulty urinating, frequency, menstrual problem, pelvic pain, vaginal discharge and vaginal pain.   Neurological: Negative for headaches.   Psychiatric/Behavioral: Negative for agitation, self-injury, sleep disturbance and suicidal ideas.        Sleeping good      Objective   Vital Signs:   There were no vitals taken for this visit.    Physical Exam   Constitutional: She appears well-developed.   Pulmonary/Chest: Effort normal.   Neurological: She is alert.   Psychiatric: She has a normal mood and affect.            Assessment and Plan      Pt reports following VS:  Wt 186.8  /78  Pulse 76    Pt reports PCP referral to dietician/bariatrics.   Pt opts to continue phentermine at this time.   Will refill.     Diagnoses and all orders for this visit:    1. Obesity (BMI 30-39.9)  -     phentermine (ADIPEX-P) 37.5 MG tablet; Take 1 tablet by mouth Every Morning Before Breakfast.  Dispense: 30 tablet; Refill: 0        Follow Up   Return in about 1 month (around 1/15/2023) for med  check.  Patient was given instructions and counseling regarding her condition or for health maintenance advice. Please see specific information pulled into the AVS if appropriate.

## 2023-01-18 ENCOUNTER — TELEPHONE (OUTPATIENT)
Dept: OBSTETRICS AND GYNECOLOGY | Facility: CLINIC | Age: 38
End: 2023-01-18
Payer: COMMERCIAL

## 2023-01-18 ENCOUNTER — OFFICE VISIT (OUTPATIENT)
Dept: OBSTETRICS AND GYNECOLOGY | Facility: CLINIC | Age: 38
End: 2023-01-18
Payer: COMMERCIAL

## 2023-01-18 VITALS
HEIGHT: 61 IN | DIASTOLIC BLOOD PRESSURE: 70 MMHG | BODY MASS INDEX: 36.06 KG/M2 | SYSTOLIC BLOOD PRESSURE: 108 MMHG | WEIGHT: 191 LBS

## 2023-01-18 DIAGNOSIS — E66.9 OBESITY (BMI 30-39.9): ICD-10-CM

## 2023-01-18 DIAGNOSIS — Z79.899 MEDICATION MANAGEMENT: Primary | ICD-10-CM

## 2023-01-18 PROCEDURE — 99213 OFFICE O/P EST LOW 20 MIN: CPT | Performed by: NURSE PRACTITIONER

## 2023-01-18 RX ORDER — SEMAGLUTIDE 0.25 MG/.5ML
0.25 INJECTION, SOLUTION SUBCUTANEOUS WEEKLY
Qty: 2 ML | Refills: 0 | Status: SHIPPED | OUTPATIENT
Start: 2023-01-18 | End: 2023-02-17

## 2023-01-18 RX ORDER — PHENTERMINE HYDROCHLORIDE 37.5 MG/1
37.5 TABLET ORAL
Qty: 30 TABLET | Refills: 0 | Status: SHIPPED | OUTPATIENT
Start: 2023-01-18 | End: 2023-02-17 | Stop reason: SDUPTHER

## 2023-01-18 NOTE — PROGRESS NOTES
"Chief Complaint  Med Management (Pt is here for a f/u on her Phentermine medication.   Pt has no complaints. )    Subjective          Kristen Christie presents to [facility]  History of Present Illness    Review of Systems   Constitutional: Negative for activity change, appetite change, fatigue and fever.        Change in diet r/t problems with kitchen and had to eat out a lot   Respiratory: Negative for apnea and shortness of breath.    Cardiovascular: Negative for chest pain and palpitations.   Gastrointestinal: Negative for abdominal distention, abdominal pain, constipation, diarrhea, nausea and vomiting.   Endocrine: Negative for cold intolerance and heat intolerance.   Genitourinary: Negative for difficulty urinating, frequency, menstrual problem, pelvic pain, vaginal discharge and vaginal pain.   Neurological: Negative for headaches.   Psychiatric/Behavioral: Negative for agitation and sleep disturbance.         Objective   Vital Signs:   /70   Ht 154.9 cm (61\")   Wt 86.6 kg (191 lb)   BMI 36.09 kg/m²     Physical Exam    Result Review :                       Assessment and Plan      Discussed diet and exercise.   Advised pt if we continue phentermine then she will need to start a step down with it next month.   Advised working on diet and habits.   Discussed long term medications.   Pt opts to try wegovy.   Advised pt there will be a delay r/t need for PA and will send in phentermine to use until Wegovy can be approved.   Pt voiced understanding.   Pt advised to call with any questions or concerns.   Discussed S&S to report. Pt voiced understanding.     Christiano checked.     Diagnoses and all orders for this visit:    1. Medication management (Primary)    2. Obesity (BMI 30-39.9)  -     phentermine (ADIPEX-P) 37.5 MG tablet; Take 1 tablet by mouth Every Morning Before Breakfast.  Dispense: 30 tablet; Refill: 0    Other orders  -     Semaglutide-Weight Management (Wegovy) 0.25 " MG/0.5ML solution auto-injector; Inject 0.25 mg under the skin into the appropriate area as directed 1 (One) Time Per Week.  Dispense: 2 mL; Refill: 0  -     Insulin Pen Needle 31G X 5 MM misc; 1 needle daily with administration of Wegovy  Dispense: 30 each; Refill: 0          Class 2 Severe Obesity (BMI >=35 and <=39.9). Obesity-related health conditions include the following: none. Obesity is worsening. BMI is is above average; no BMI management plan is appropriate. We discussed portion control, increasing exercise and pharmacologic options including phentermine or wegovy.       Follow Up   Return in about 1 month (around 2/18/2023).    Patient was given instructions and counseling regarding her condition or for health maintenance advice. Please see specific information pulled into the AVS if appropriate.

## 2023-01-20 NOTE — PATIENT INSTRUCTIONS

## 2023-02-09 NOTE — PROGRESS NOTES
CC: f/u preventive health AND obesity    History:  Kristen Christie is a 37 y.o. female who presents today for evaluation of the above problems.  She notes she has been doing well, but continues battling with her weight. She has only been using a half pill of her phentermine, though she has not seen weight loss with this dose.       ROS:  Review of Systems   Constitutional: Negative for chills and fever.   HENT: Negative for congestion and sore throat.    Eyes: Negative for visual disturbance.   Respiratory: Negative for cough and shortness of breath.    Cardiovascular: Negative for chest pain and palpitations.   Gastrointestinal: Negative for abdominal pain, constipation and nausea.   Endocrine: Negative for cold intolerance and heat intolerance.   Genitourinary: Negative for difficulty urinating and frequency.   Musculoskeletal: Negative for arthralgias and back pain.   Skin: Negative for rash.   Neurological: Negative for dizziness and headaches.   Psychiatric/Behavioral: Negative for dysphoric mood. The patient is not nervous/anxious.        No Known Allergies  Past Medical History:   Diagnosis Date   • Abnormal Pap smear of cervix    • Anxiety    • Arthritis    • Asthma    • Cervical dysplasia    • COVID-19 virus infection 07/2021    again in 1/22   • Depression    • Seasonal allergies      Past Surgical History:   Procedure Laterality Date   • COLPOSCOPY     • LEEP  2015   • TONSILLECTOMY  2004     Family History   Problem Relation Age of Onset   • Diabetes Father    • Hypertension Father    • Diabetes Paternal Grandmother    • Hypertension Paternal Grandmother    • Stroke Paternal Grandmother    • Stroke Mother    • Hypertension Sister    • Stroke Maternal Grandmother    • Breast cancer Maternal Grandmother 60   • Stroke Maternal Grandfather    • Hypertension Brother    • Ovarian cancer Maternal Great-Grandmother         age unknown   • Uterine cancer Neg Hx    • Colon cancer Neg Hx    • Melanoma Neg Hx    •  Plastic Surgery Progress Note    Subjective: Postoperative day #2.  The patient is awake and alert in his hospital bed this morning.    Objective:      Vitals  Vitals:    02/08/23 2312 02/09/23 0318 02/09/23 0426 02/09/23 0731   Temp:  97.2 °F (36.2 °C)  97.3 °F (36.3 °C)   Pulse:  91  84   Resp: 16  16 18   SpO2:  90%  94%   BP:  137/84  138/78    02/09/23 1133   Temp: 98.1 °F (36.7 °C)   Pulse: 92   Resp: 18   SpO2: 95%   BP: (!) 142/81     I/O's    Intake/Output Summary (Last 24 hours) at 2/9/2023 1220  Last data filed at 2/9/2023 0957  Gross per 24 hour   Intake --   Output 1050 ml   Net -1050 ml     I/O last 3 completed shifts:  In: -   Out: 1200 [Urine:1200]  I/O this shift:  In: -   Out: 450 [Urine:450]    Physical Exam: Maxillomandibular fixation was inspected.  The dental occlusion is good.  3 of the wires had been loosened by the patient yesterday and were retightened using a heavy needle  at bedside today.  Additional bone wax was placed over all exposed wire edges.    Labs   Recent Labs   Lab 02/09/23  0623 02/08/23  0517 02/07/23  0750 02/06/23  0322 02/05/23  0906 02/05/23  0429 02/04/23  1802   WBC 13.6* 16.8* 20.1* 18.5*  --  23.0* 26.8*   HGB 8.6* 8.6* 9.1* 9.9*   < > 11.1* 13.5   HCT 26.8* 26.7* 28.4* 30.6*   < > 34.2* 41.3   MCV 94.0 93.7 92.5 93.9  --  93.2 92.6    361 359 389  --  425 577*   SODIUM  --  140 140 139  --  138 140   POTASSIUM 3.7 3.8 4.1 4.3  --  4.6 3.8   CHLORIDE  --  104 104 106  --  105 105   CO2  --  31 29 29  --  24 29   BUN  --  15 16 14  --  13 12   CREATININE  --  0.62* 0.47* 0.74  --  0.79 0.88   GLUCOSE  --  98 137* 133*  --  164* 158*   CALCIUM  --  8.3* 8.6 8.3*  --  8.3* 9.3   AST  --   --   --  74*  --  91* 110*   GPT  --   --   --  45  --  63 82*    < > = values in this interval not displayed.     Assessment/Plan: Status post placement of maxillomandibular fixation for right mandibular body and left mandibular neck fracture.  The patient is doing well  "Prostate cancer Neg Hx       reports that she quit smoking about 10 years ago. She quit after 10.00 years of use. She has never used smokeless tobacco. She reports previous alcohol use. She reports that she does not use drugs.      Current Outpatient Medications:   •  albuterol sulfate  (90 Base) MCG/ACT inhaler, Inhale 2 puffs Every 6 (Six) Hours As Needed for Wheezing or Shortness of Air (cough)., Disp: 18 g, Rfl: 0  •  busPIRone (BUSPAR) 10 MG tablet, Take 5 mg by mouth Daily., Disp: , Rfl:   •  FLUoxetine (PROzac) 10 MG capsule, Take 1 capsule by mouth Daily., Disp: 30 capsule, Rfl: 5  •  fluticasone (FLONASE) 50 MCG/ACT nasal spray, 2 sprays into the nostril(s) as directed by provider Daily., Disp: 9.9 mL, Rfl: 2  •  loratadine (CLARITIN) 10 MG tablet, Take 1 tablet by mouth Daily., Disp: 30 tablet, Rfl: 10  •  phentermine 37.5 MG capsule, Take 1 capsule by mouth Every Morning., Disp: 30 capsule, Rfl: 2    OBJECTIVE:  /84 (BP Location: Left arm, Patient Position: Sitting, Cuff Size: Adult)   Pulse 81   Temp 97.6 °F (36.4 °C)   Resp 16   Ht 154.9 cm (61\")   Wt 88.7 kg (195 lb 9.6 oz)   SpO2 98%   Breastfeeding No   BMI 36.96 kg/m²    Physical Exam  Constitutional:       General: She is not in acute distress.     Appearance: She is well-developed.   HENT:      Head: Normocephalic and atraumatic.      Right Ear: External ear normal.      Left Ear: External ear normal.   Eyes:      General: No scleral icterus.     Extraocular Movements: Extraocular movements intact.   Neck:      Trachea: No tracheal deviation.   Cardiovascular:      Rate and Rhythm: Normal rate and regular rhythm.      Heart sounds: Normal heart sounds. No murmur heard.      Pulmonary:      Effort: Pulmonary effort is normal. No accessory muscle usage or respiratory distress.      Breath sounds: Normal breath sounds. No wheezing.   Abdominal:      General: There is no distension.      Palpations: Abdomen is soft.      " from my perspective.    1.  Continue wired jaw blenderized diet.  2.  Due to the patient's baseline mental status, swish and spit with Peridex mouth rinse is difficult for the patient.  I have asked the nursing staff to perform syringe irrigation with Peridex mouth rinse instead.  I would avoid a water pik at this time because I am concerned it might disrupt the rubber band fixation.      Todd Edouard MD     Tenderness: There is no abdominal tenderness.   Musculoskeletal:         General: Normal range of motion.      Cervical back: Normal range of motion and neck supple.      Right lower leg: No edema.      Left lower leg: No edema.   Skin:     General: Skin is warm and dry.      Nails: There is no clubbing.   Neurological:      Mental Status: She is alert and oriented to person, place, and time.      Coordination: Coordination normal.      Gait: Gait normal.   Psychiatric:         Mood and Affect: Mood normal. Mood is not anxious or depressed.         Behavior: Behavior normal.         Assessment/Plan     Diagnoses and all orders for this visit:    1. Annual visit for general adult medical examination with abnormal findings (Primary)  -     Comprehensive Metabolic Panel; Future  -     Hemoglobin A1c; Future  -     Lipid Panel; Future  -     CBC (No Diff); Future  Immunizations:      - Tetanus: Received in 2018      - Influenza: Due, but refused.      - Pneumovax: Due related to asthma, but refused.       - Prevnar: Once after age 65      - Shingrix: Series after 50      - COVID: Recommended, but declined at this time.   CRC screening: Due at 45  Mammogram: Due at 50  PAP: was done on approximately 10/2021 and the result was: normal PAP with negative HPV. Repeat 5 years.   DEXA: DEXA scan at 63     2. Anxiety and depression  -     FLUoxetine (PROzac) 10 MG capsule; Take 1 capsule by mouth Daily.  Dispense: 30 capsule; Refill: 5  Stable on SSRI without desire for dose adjustment.     3. Mild intermittent asthma without complication  Stable without exacerbation.     4. Class 2 obesity due to excess calories without serious comorbidity with body mass index (BMI) of 37.0 to 37.9 in adult  -     Hemoglobin A1c; Future  -     Cortisol - AM; Future  -     TSH; Future  -     phentermine 37.5 MG capsule; Take 1 capsule by mouth Every Morning.  Dispense: 30 capsule; Refill: 2  Patient's Body mass index is 36.96 kg/m². indicating  that she is obese (BMI >30). Obesity-related health conditions include the following: none. Obesity is worsening. BMI is is above average; BMI management plan is completed. We discussed portion control, increasing exercise and pharmacologic options including phentermine...PDMP data reviewed.      {Review (Popup)  Instructions  Quality  Charge Capture  LOS  Follow-up  Communications :23}   An After Visit Summary was printed and given to the patient at discharge.  Return in about 3 months (around 4/28/2022) for Recheck.         Dnay Jones D.O. 1/29/2022   Electronically signed.

## 2023-02-17 ENCOUNTER — OFFICE VISIT (OUTPATIENT)
Dept: OBSTETRICS AND GYNECOLOGY | Facility: CLINIC | Age: 38
End: 2023-02-17
Payer: COMMERCIAL

## 2023-02-17 VITALS
SYSTOLIC BLOOD PRESSURE: 116 MMHG | HEIGHT: 61 IN | DIASTOLIC BLOOD PRESSURE: 72 MMHG | BODY MASS INDEX: 35.68 KG/M2 | WEIGHT: 189 LBS

## 2023-02-17 DIAGNOSIS — E66.9 OBESITY (BMI 30-39.9): ICD-10-CM

## 2023-02-17 PROCEDURE — 99213 OFFICE O/P EST LOW 20 MIN: CPT | Performed by: NURSE PRACTITIONER

## 2023-02-17 RX ORDER — PHENTERMINE HYDROCHLORIDE 37.5 MG/1
37.5 TABLET ORAL
Qty: 30 TABLET | Refills: 0 | Status: SHIPPED | OUTPATIENT
Start: 2023-02-17

## 2023-02-17 NOTE — PROGRESS NOTES
"Chief Complaint  Med Management (Pt is here for a f/u on her Phentermine.  Pts insurance did not cover wegovy.  )    Subjective          Kristenmiguelina Christie presents to [facility]  History of Present Illness    Review of Systems   Constitutional: Negative for activity change, appetite change, fatigue and fever.        Difficulty losing wt   Respiratory: Negative for apnea and shortness of breath.    Cardiovascular: Negative for chest pain and palpitations.   Gastrointestinal: Negative for abdominal distention, abdominal pain, constipation, diarrhea, nausea and vomiting.   Endocrine: Negative for cold intolerance and heat intolerance.   Genitourinary: Negative for difficulty urinating, frequency, menstrual problem, pelvic pain, vaginal discharge and vaginal pain.   Neurological: Negative for headaches.   Psychiatric/Behavioral: Negative for agitation and sleep disturbance.         Objective   Vital Signs:   /72   Ht 154.9 cm (61\")   Wt 85.7 kg (189 lb)   BMI 35.71 kg/m²     Physical Exam  Vitals reviewed.   Constitutional:       Appearance: She is well-developed.   Eyes:      General:         Right eye: No discharge.         Left eye: No discharge.   Cardiovascular:      Rate and Rhythm: Normal rate and regular rhythm.   Pulmonary:      Effort: Pulmonary effort is normal.      Breath sounds: Normal breath sounds.   Skin:     General: Skin is warm.   Neurological:      Mental Status: She is alert and oriented to person, place, and time.   Psychiatric:         Behavior: Behavior normal.         Thought Content: Thought content normal.         Judgment: Judgment normal.         Result Review :                       Assessment and Plan      Wegovy was denied by insurance.     Discussed referral options for pt.   Pt opts to work on changing eating habits.   Will refill Phentermine.   Pt advised to call with any questions or concerns.   Discussed S&S to report. Pt voiced understanding. "     Diagnoses and all orders for this visit:    1. Obesity (BMI 30-39.9)  -     phentermine (ADIPEX-P) 37.5 MG tablet; Take 1 tablet by mouth Every Morning Before Breakfast.  Dispense: 30 tablet; Refill: 0          Class 2 Severe Obesity (BMI >=35 and <=39.9). Obesity-related health conditions include the following: none. Obesity is improving with treatment. BMI is is above average; no BMI management plan is appropriate. We discussed low calorie, low carb based diet program, portion control and increasing exercise.       Follow Up   Return in about 1 month (around 3/17/2023).    Patient was given instructions and counseling regarding her condition or for health maintenance advice. Please see specific information pulled into the AVS if appropriate.

## 2023-02-17 NOTE — PATIENT INSTRUCTIONS

## 2023-04-21 DIAGNOSIS — F41.9 ANXIETY AND DEPRESSION: ICD-10-CM

## 2023-04-21 DIAGNOSIS — F32.A ANXIETY AND DEPRESSION: ICD-10-CM

## 2023-04-24 RX ORDER — FLUOXETINE 10 MG/1
10 CAPSULE ORAL DAILY
Qty: 30 CAPSULE | Refills: 5 | OUTPATIENT
Start: 2023-04-24

## 2023-05-23 DIAGNOSIS — F32.A ANXIETY AND DEPRESSION: ICD-10-CM

## 2023-05-23 DIAGNOSIS — F41.9 ANXIETY AND DEPRESSION: ICD-10-CM

## 2023-05-23 RX ORDER — FLUOXETINE 10 MG/1
10 CAPSULE ORAL DAILY
Qty: 30 CAPSULE | Refills: 2 | Status: SHIPPED | OUTPATIENT
Start: 2023-05-23

## 2023-05-23 RX ORDER — FLUOXETINE 10 MG/1
10 CAPSULE ORAL DAILY
Qty: 30 CAPSULE | Refills: 2 | Status: SHIPPED | OUTPATIENT
Start: 2023-05-23 | End: 2023-05-23 | Stop reason: SDUPTHER

## 2023-08-23 ENCOUNTER — TELEPHONE (OUTPATIENT)
Dept: INTERNAL MEDICINE | Facility: CLINIC | Age: 38
End: 2023-08-23

## 2023-08-23 ENCOUNTER — LAB (OUTPATIENT)
Dept: LAB | Facility: HOSPITAL | Age: 38
End: 2023-08-23
Payer: COMMERCIAL

## 2023-08-23 ENCOUNTER — OFFICE VISIT (OUTPATIENT)
Dept: INTERNAL MEDICINE | Facility: CLINIC | Age: 38
End: 2023-08-23
Payer: COMMERCIAL

## 2023-08-23 VITALS
HEART RATE: 78 BPM | DIASTOLIC BLOOD PRESSURE: 62 MMHG | OXYGEN SATURATION: 98 % | RESPIRATION RATE: 16 BRPM | HEIGHT: 61 IN | TEMPERATURE: 97.8 F | BODY MASS INDEX: 36.82 KG/M2 | SYSTOLIC BLOOD PRESSURE: 106 MMHG | WEIGHT: 195 LBS

## 2023-08-23 DIAGNOSIS — F41.9 ANXIETY AND DEPRESSION: ICD-10-CM

## 2023-08-23 DIAGNOSIS — Z00.00 PREVENTATIVE HEALTH CARE: ICD-10-CM

## 2023-08-23 DIAGNOSIS — J01.00 ACUTE NON-RECURRENT MAXILLARY SINUSITIS: Primary | ICD-10-CM

## 2023-08-23 DIAGNOSIS — B37.9 CANDIDA ALBICANS INFECTION: ICD-10-CM

## 2023-08-23 DIAGNOSIS — B35.3 TINEA PEDIS OF BOTH FEET: ICD-10-CM

## 2023-08-23 DIAGNOSIS — F32.A ANXIETY AND DEPRESSION: ICD-10-CM

## 2023-08-23 LAB
ALBUMIN SERPL-MCNC: 4.3 G/DL (ref 3.5–5.2)
ALBUMIN/GLOB SERPL: 1.7 G/DL
ALP SERPL-CCNC: 60 U/L (ref 39–117)
ALT SERPL W P-5'-P-CCNC: 16 U/L (ref 1–33)
ANION GAP SERPL CALCULATED.3IONS-SCNC: 12 MMOL/L (ref 5–15)
AST SERPL-CCNC: 16 U/L (ref 1–32)
BILIRUB SERPL-MCNC: 0.2 MG/DL (ref 0–1.2)
BUN SERPL-MCNC: 10 MG/DL (ref 6–20)
BUN/CREAT SERPL: 12.2 (ref 7–25)
CALCIUM SPEC-SCNC: 9.2 MG/DL (ref 8.6–10.5)
CHLORIDE SERPL-SCNC: 105 MMOL/L (ref 98–107)
CHOLEST SERPL-MCNC: 223 MG/DL (ref 0–200)
CO2 SERPL-SCNC: 23 MMOL/L (ref 22–29)
CREAT SERPL-MCNC: 0.82 MG/DL (ref 0.57–1)
DEPRECATED RDW RBC AUTO: 44.1 FL (ref 37–54)
EGFRCR SERPLBLD CKD-EPI 2021: 92.9 ML/MIN/1.73
ERYTHROCYTE [DISTWIDTH] IN BLOOD BY AUTOMATED COUNT: 13.1 % (ref 12.3–15.4)
GLOBULIN UR ELPH-MCNC: 2.5 GM/DL
GLUCOSE SERPL-MCNC: 81 MG/DL (ref 65–99)
HBA1C MFR BLD: 5.2 % (ref 4.8–5.6)
HCT VFR BLD AUTO: 41.5 % (ref 34–46.6)
HDLC SERPL-MCNC: 74 MG/DL (ref 40–60)
HGB BLD-MCNC: 13 G/DL (ref 12–15.9)
LDLC SERPL CALC-MCNC: 123 MG/DL (ref 0–100)
LDLC/HDLC SERPL: 1.61 {RATIO}
MCH RBC QN AUTO: 28.8 PG (ref 26.6–33)
MCHC RBC AUTO-ENTMCNC: 31.3 G/DL (ref 31.5–35.7)
MCV RBC AUTO: 92 FL (ref 79–97)
PLATELET # BLD AUTO: 318 10*3/MM3 (ref 140–450)
PMV BLD AUTO: 10.1 FL (ref 6–12)
POTASSIUM SERPL-SCNC: 4 MMOL/L (ref 3.5–5.2)
PROT SERPL-MCNC: 6.8 G/DL (ref 6–8.5)
RBC # BLD AUTO: 4.51 10*6/MM3 (ref 3.77–5.28)
SODIUM SERPL-SCNC: 140 MMOL/L (ref 136–145)
TRIGL SERPL-MCNC: 148 MG/DL (ref 0–150)
VLDLC SERPL-MCNC: 26 MG/DL (ref 5–40)
WBC NRBC COR # BLD: 7.59 10*3/MM3 (ref 3.4–10.8)

## 2023-08-23 PROCEDURE — 85027 COMPLETE CBC AUTOMATED: CPT

## 2023-08-23 PROCEDURE — 80053 COMPREHEN METABOLIC PANEL: CPT

## 2023-08-23 PROCEDURE — 80061 LIPID PANEL: CPT

## 2023-08-23 PROCEDURE — 36415 COLL VENOUS BLD VENIPUNCTURE: CPT

## 2023-08-23 PROCEDURE — 83036 HEMOGLOBIN GLYCOSYLATED A1C: CPT

## 2023-08-23 RX ORDER — METHYLPREDNISOLONE 4 MG/1
TABLET ORAL
Qty: 21 TABLET | Refills: 0 | Status: SHIPPED | OUTPATIENT
Start: 2023-08-23

## 2023-08-23 RX ORDER — NYSTATIN 100000 [USP'U]/G
POWDER TOPICAL 4 TIMES DAILY
Qty: 60 G | Refills: 1 | Status: SHIPPED | OUTPATIENT
Start: 2023-08-23

## 2023-08-23 RX ORDER — AMOXICILLIN AND CLAVULANATE POTASSIUM 875; 125 MG/1; MG/1
1 TABLET, FILM COATED ORAL 2 TIMES DAILY
Qty: 20 TABLET | Refills: 0 | Status: SHIPPED | OUTPATIENT
Start: 2023-08-23 | End: 2023-09-02

## 2023-08-23 RX ORDER — CLOTRIMAZOLE 1 %
1 CREAM (GRAM) TOPICAL 2 TIMES DAILY
Qty: 60 G | Refills: 1 | Status: SHIPPED | OUTPATIENT
Start: 2023-08-23

## 2023-08-23 NOTE — TELEPHONE ENCOUNTER
Caller: Kristen Christie    Relationship: Self    Best call back number: [phone]     What is the best time to reach you: ANY    Who are you requesting to speak with (clinical staff, provider,  specific staff member): CLINICAL       What was the call regarding: GENE SITE TESTING.  HAS TO BE APPROVED, BUT ARE WE SUPPOSED TO BE GETTING BACK IN TOUCH WITH HER, WHAT DOES SHE NEED TO DO NOW?

## 2023-08-23 NOTE — PROGRESS NOTES
Chief Complaint   Patient presents with    Earache     Left, for approx. 2 & 1/2 months, Pt thinks symptoms are sinus related    Jaw Pain     left         HPI     Kristen Christie is a 38 y.o. female presents for right jaw pain and sinus pain that has been present for 2 months. She is using silver nasal spray with some relief. She does notice swelling in the morning upon waking. She denies fever or nasal drainage just describes pressure to jaw, sinus and ear. She is seeing a therapist who recommended GeneSite testing and she wishes to have this done. She also has rash that comes and goes under breasts and athletes foot that is not improving.          ROS:  Review of Systems   Constitutional:  Negative for fatigue and fever.   HENT:  Positive for ear pain, facial swelling, sinus pressure and sinus pain. Negative for congestion.    Respiratory: Negative.     Cardiovascular: Negative.    Hematological:  Negative for adenopathy.        reports that she quit smoking about 11 years ago. Her smoking use included cigarettes. She has never used smokeless tobacco. She reports that she does not currently use alcohol. She reports that she does not use drugs.    Current Outpatient Medications:     busPIRone (BUSPAR) 10 MG tablet, Take 0.5 tablets by mouth Daily., Disp: , Rfl:     FLUoxetine (PROzac) 10 MG capsule, Take 1 capsule by mouth Daily., Disp: 30 capsule, Rfl: 2    loratadine (CLARITIN) 10 MG tablet, Take 1 tablet by mouth Daily., Disp: 30 tablet, Rfl: 10    phentermine (ADIPEX-P) 37.5 MG tablet, Take 1 tablet by mouth Every Morning Before Breakfast., Disp: 30 tablet, Rfl: 0    amoxicillin-clavulanate (AUGMENTIN) 875-125 MG per tablet, Take 1 tablet by mouth 2 (Two) Times a Day for 10 days., Disp: 20 tablet, Rfl: 0    clotrimazole (Clotrimazole Athletes Foot) 1 % cream, Apply 1 application  topically to the appropriate area as directed 2 (Two) Times a Day., Disp: 60 g, Rfl: 1    methylPREDNISolone (MEDROL) 4 MG dose pack,  "Take as directed on package instructions., Disp: 21 tablet, Rfl: 0    nystatin (MYCOSTATIN) 549713 UNIT/GM powder, Apply  topically to the appropriate area as directed 4 (Four) Times a Day., Disp: 60 g, Rfl: 1    OBJECTIVE:  /62 (BP Location: Left arm, Patient Position: Sitting, Cuff Size: Large Adult)   Pulse 78   Temp 97.8 øF (36.6 øC) (Temporal)   Resp 16   Ht 154.9 cm (61\")   Wt 88.5 kg (195 lb)   SpO2 98%   BMI 36.84 kg/mý    Physical Exam  Constitutional:       General: She is not in acute distress.  HENT:      Head: No right periorbital erythema.      Jaw: Tenderness present. No swelling.        Comments: No visible swelling today, tenderness along maxillary sinus, right jaw      Ears:      Comments: Erythema to ear canal, dull tm      Nose:      Right Sinus: Maxillary sinus tenderness and frontal sinus tenderness present.      Mouth/Throat:      Mouth: Mucous membranes are moist.   Cardiovascular:      Rate and Rhythm: Normal rate and regular rhythm.      Heart sounds: Normal heart sounds.   Pulmonary:      Breath sounds: Normal breath sounds.       ASSESSMENT/PLAN:     Diagnoses and all orders for this visit:    1. Acute non-recurrent maxillary sinusitis (Primary)  -     amoxicillin-clavulanate (AUGMENTIN) 875-125 MG per tablet; Take 1 tablet by mouth 2 (Two) Times a Day for 10 days.  Dispense: 20 tablet; Refill: 0  -     methylPREDNISolone (MEDROL) 4 MG dose pack; Take as directed on package instructions.  Dispense: 21 tablet; Refill: 0  Will treat for sinusitis though if symptoms not improving in a week or 2 she will let us know, at which time I would consider imaging given chronicity.     2. Candida albicans infection  -     nystatin (MYCOSTATIN) 363222 UNIT/GM powder; Apply  topically to the appropriate area as directed 4 (Four) Times a Day.  Dispense: 60 g; Refill: 1    3. Tinea pedis of both feet  -     clotrimazole (Clotrimazole Athletes Foot) 1 % cream; Apply 1 application  topically to " the appropriate area as directed 2 (Two) Times a Day.  Dispense: 60 g; Refill: 1    4. Anxiety and depression  -     GeneSight - Swab,; Future    5. Preventative health care  -     CBC No Differential; Future  -     Comprehensive metabolic panel; Future  -     Lipid panel; Future  -     Hemoglobin A1c; Future                   An After Visit Summary was printed and given to the patient at discharge.  Return in about 6 months (around 2/23/2024) for Annual physical.          REMI Early 8/23/2023   Electronically signed.

## 2023-08-28 DIAGNOSIS — J32.0 CHRONIC MAXILLARY SINUSITIS: ICD-10-CM

## 2023-08-28 DIAGNOSIS — J01.00 ACUTE NON-RECURRENT MAXILLARY SINUSITIS: Primary | ICD-10-CM

## 2023-08-28 NOTE — TELEPHONE ENCOUNTER
Caller: Kristen Christie    Relationship: Self    Best call back number: [phone]    What is the best time to reach you: ANYTIME    Who are you requesting to speak with (clinical staff, provider,  specific staff member): CLINICAL    What was the call regarding: STATES SHE'S NOT FEELING ANY BETTER, AND WANTED TO GET STATUS UPDATE.

## 2023-08-31 ENCOUNTER — TELEPHONE (OUTPATIENT)
Dept: INTERNAL MEDICINE | Facility: CLINIC | Age: 38
End: 2023-08-31
Payer: COMMERCIAL

## 2023-08-31 NOTE — TELEPHONE ENCOUNTER
"Joselyn with the [facility] Financial Clearance Department called to ask if a Peer to Peer can be done for the CT that was ordered for patient. The financial clearance department has received a denial for the CT from insurance and the CT is scheduled for tomorrow.  Joselyn was informed the ordering provider (Lashonda) has already left the office and will be back tomorrow morning.  She asked if we could give the okay to cancel and reschedule the test and she was informed, per Dr. Jones, the test does not need to be cancelled.  She was informed to call back tomorrow when the ordering provider will be in the office.     The denial letter from insurance has been scanned into \"Media\" in patient's chart.  "

## 2023-09-01 ENCOUNTER — TELEPHONE (OUTPATIENT)
Dept: INTERNAL MEDICINE | Facility: CLINIC | Age: 38
End: 2023-09-01
Payer: COMMERCIAL

## 2023-09-01 DIAGNOSIS — H93.8X3 SENSATION OF FULLNESS IN BOTH EARS: ICD-10-CM

## 2023-09-01 DIAGNOSIS — R68.84 JAW PAIN: Primary | ICD-10-CM

## 2023-09-01 RX ORDER — LORATADINE PSEUDOEPHEDRINE SULFATE 10; 240 MG/1; MG/1
1 TABLET, EXTENDED RELEASE ORAL DAILY
Qty: 10 TABLET | Refills: 0 | Status: SHIPPED | OUTPATIENT
Start: 2023-09-01

## 2023-09-01 NOTE — TELEPHONE ENCOUNTER
"Patient has been contacted and informed the CT had to be cancelled for now due to denial by insurance.  Patient stated she will take her last dose of antibiotic tomorrow and she did complete the steroid pack that was ordered.  Patient reported she is still having right ear pain and fullness and sometimes it feels like she hears an echo in that ear as well. She also reports having upper and lower jaw pain and said it hurts when she opens her mouth wide to talk or chew food. Patient reports she uses \"Silver Spray\" (a sinus spray) 3 times daily.    "

## 2023-09-01 NOTE — TELEPHONE ENCOUNTER
It may be worth getting an xray of the jaw given ongoing pain. I have ordered this, she may check in at registration at her convenience. I also think adding decongestant may help with ear pain and fullness. I would finish antibiotics, start claritin D I have sent to the pharmacy and let us know how symptoms are early next week. If not better we will consider 2nd course of antibiotics and ENT (or I can refer now if she'd prefer).

## 2023-09-05 NOTE — TELEPHONE ENCOUNTER
There is a separate phone message thread for this patient.  Patient was informed last week of Lashonda's message regarding cancellation of CT and insurance requirements.

## 2023-09-13 RX ORDER — BUSPIRONE HYDROCHLORIDE 10 MG/1
5-10 TABLET ORAL DAILY
Qty: 30 TABLET | Refills: 3 | Status: SHIPPED | OUTPATIENT
Start: 2023-09-13

## 2023-09-13 NOTE — TELEPHONE ENCOUNTER
Caller: Kristen Christie    Relationship: Self    Best call back number: [phone]    Requested Prescriptions:   Requested Prescriptions     Pending Prescriptions Disp Refills    busPIRone (BUSPAR) 10 MG tablet       Sig: Take 0.5 tablets by mouth Daily.        Pharmacy where request should be sent: [facility] [address] - [phone]  - [phone]    Last office visit with prescribing clinician: 1/28/2022   Last telemedicine visit with prescribing clinician: Visit date not found   Next office visit with prescribing clinician: 2/26/2024     Does the patient have less than a 3 day supply:  [x] Yes  [] No    Would you like a call back once the refill request has been completed: [x] Yes [] No    If the office needs to give you a call back, can they leave a voicemail: [x] Yes [] No    Venkat Taylor Rep   09/13/23 09:56 CDT

## 2023-09-19 ENCOUNTER — OFFICE VISIT (OUTPATIENT)
Dept: INTERNAL MEDICINE | Facility: CLINIC | Age: 38
End: 2023-09-19
Payer: COMMERCIAL

## 2023-09-19 ENCOUNTER — LAB (OUTPATIENT)
Dept: LAB | Facility: HOSPITAL | Age: 38
End: 2023-09-19
Payer: COMMERCIAL

## 2023-09-19 ENCOUNTER — HOSPITAL ENCOUNTER (OUTPATIENT)
Dept: GENERAL RADIOLOGY | Facility: HOSPITAL | Age: 38
Discharge: HOME | End: 2023-09-19
Admitting: NURSE PRACTITIONER
Payer: COMMERCIAL

## 2023-09-19 VITALS
DIASTOLIC BLOOD PRESSURE: 78 MMHG | SYSTOLIC BLOOD PRESSURE: 116 MMHG | HEART RATE: 76 BPM | WEIGHT: 191 LBS | HEIGHT: 61 IN | BODY MASS INDEX: 36.06 KG/M2 | OXYGEN SATURATION: 99 %

## 2023-09-19 DIAGNOSIS — R31.0 GROSS HEMATURIA: ICD-10-CM

## 2023-09-19 DIAGNOSIS — J01.00 SUBACUTE MAXILLARY SINUSITIS: Primary | ICD-10-CM

## 2023-09-19 DIAGNOSIS — R68.84 JAW PAIN: ICD-10-CM

## 2023-09-19 DIAGNOSIS — R22.0 FACIAL SWELLING: ICD-10-CM

## 2023-09-19 LAB
BACTERIA UR QL AUTO: ABNORMAL /HPF
BILIRUB UR QL STRIP: NEGATIVE
CLARITY UR: CLEAR
COLOR UR: YELLOW
GLUCOSE UR STRIP-MCNC: NEGATIVE MG/DL
HGB UR QL STRIP.AUTO: ABNORMAL
HYALINE CASTS UR QL AUTO: ABNORMAL /LPF
KETONES UR QL STRIP: NEGATIVE
LEUKOCYTE ESTERASE UR QL STRIP.AUTO: ABNORMAL
NITRITE UR QL STRIP: NEGATIVE
PH UR STRIP.AUTO: 5.5 [PH] (ref 5–8)
PROT UR QL STRIP: NEGATIVE
RBC # UR STRIP: ABNORMAL /HPF
REF LAB TEST METHOD: ABNORMAL
SP GR UR STRIP: 1.01 (ref 1–1.03)
SQUAMOUS #/AREA URNS HPF: ABNORMAL /HPF
UROBILINOGEN UR QL STRIP: ABNORMAL
WBC # UR STRIP: ABNORMAL /HPF

## 2023-09-19 PROCEDURE — 87147 CULTURE TYPE IMMUNOLOGIC: CPT

## 2023-09-19 PROCEDURE — 87086 URINE CULTURE/COLONY COUNT: CPT

## 2023-09-19 PROCEDURE — 70110 X-RAY EXAM OF JAW 4/> VIEWS: CPT

## 2023-09-19 PROCEDURE — 81001 URINALYSIS AUTO W/SCOPE: CPT

## 2023-09-19 PROCEDURE — 99214 OFFICE O/P EST MOD 30 MIN: CPT | Performed by: NURSE PRACTITIONER

## 2023-09-19 RX ORDER — DOXYCYCLINE HYCLATE 100 MG/1
100 CAPSULE ORAL 2 TIMES DAILY
Qty: 28 CAPSULE | Refills: 0 | Status: SHIPPED | OUTPATIENT
Start: 2023-09-19

## 2023-09-19 RX ORDER — CEFDINIR 300 MG/1
300 CAPSULE ORAL 2 TIMES DAILY
Qty: 14 CAPSULE | Refills: 0 | Status: SHIPPED | OUTPATIENT
Start: 2023-09-19

## 2023-09-19 RX ORDER — FLUTICASONE PROPIONATE 50 MCG
2 SPRAY, SUSPENSION (ML) NASAL DAILY
Qty: 16 G | Refills: 0 | Status: SHIPPED | OUTPATIENT
Start: 2023-09-19

## 2023-09-19 NOTE — PROGRESS NOTES
Chief Complaint   Patient presents with    Blood in Urine    Earache    Flank Pain         History:  Kristen Christie is a 38 y.o. female who presents today for evaluation of the above problems.          Blood in urine yesterday and today, bright red, does not think vaginal. Feels some flank and back pain.    Left jaw, ear, and sinus pain worse. Took medrol and got a little relief while on it, but now back. Took Augmentin as well. States CT sinuses was denied due to needing another round of antibiotics. Now left side of face is swelling.    Blood in Urine  Associated symptoms include flank pain.   Earache     Flank Pain      ROS:  Review of Systems   HENT:  Positive for ear pain.    Genitourinary:  Positive for flank pain and hematuria.     No Known Allergies  Past Medical History:   Diagnosis Date    Abnormal Pap smear of cervix     Anxiety     Arthritis     Asthma     Cervical dysplasia     COVID-19 virus infection 07/2021    again in 1/22    Depression     Polycystic ovary syndrome     Seasonal allergies      Past Surgical History:   Procedure Laterality Date    CERVICAL BIOPSY  W/ LOOP ELECTRODE EXCISION      COLPOSCOPY      LEEP  2015    TONSILLECTOMY  2004    WISDOM TOOTH EXTRACTION  2004     Family History   Problem Relation Age of Onset    Diabetes Father     Hypertension Father     Diabetes Paternal Grandmother     Hypertension Paternal Grandmother     Stroke Paternal Grandmother     Stroke Mother     Thyroid disease Mother     Hypertension Sister     Stroke Maternal Grandmother     Breast cancer Maternal Grandmother     Stroke Maternal Grandfather     Hypertension Brother     Ovarian cancer Maternal Great-Grandmother         age unknown    Uterine cancer Neg Hx     Colon cancer Neg Hx     Melanoma Neg Hx     Prostate cancer Neg Hx      Kristen  reports that she quit smoking about 11 years ago. Her smoking use included cigarettes. She has never used smokeless tobacco. She reports that she does not currently use  "alcohol. She reports that she does not use drugs.    I have reviewed and updated the above documentation (if necessary) including but not limited to chief complaint, ROS, PFSH, allergies and medications        Current Outpatient Medications:     busPIRone (BUSPAR) 10 MG tablet, Take 0.5-1 tablets by mouth Daily., Disp: 30 tablet, Rfl: 3    clotrimazole (Clotrimazole Athletes Foot) 1 % cream, Apply 1 application  topically to the appropriate area as directed 2 (Two) Times a Day., Disp: 60 g, Rfl: 1    FLUoxetine (PROzac) 10 MG capsule, Take 1 capsule by mouth Daily., Disp: 30 capsule, Rfl: 2    loratadine (CLARITIN) 10 MG tablet, Take 1 tablet by mouth Daily., Disp: 30 tablet, Rfl: 10    nystatin (MYCOSTATIN) 758344 UNIT/GM powder, Apply  topically to the appropriate area as directed 4 (Four) Times a Day., Disp: 60 g, Rfl: 1    phentermine (ADIPEX-P) 37.5 MG tablet, Take 1 tablet by mouth Every Morning Before Breakfast., Disp: 30 tablet, Rfl: 0    doxycycline (Vibramycin) 100 MG capsule, Take 1 capsule by mouth 2 (Two) Times a Day., Disp: 28 capsule, Rfl: 0    fluticasone (FLONASE) 50 MCG/ACT nasal spray, 2 sprays into the nostril(s) as directed by provider Daily., Disp: 16 g, Rfl: 0    loratadine-pseudoephedrine (Claritin-D 24 Hour)  MG per 24 hr tablet, Take 1 tablet by mouth Daily. (Patient not taking: Reported on 9/19/2023), Disp: 10 tablet, Rfl: 0    OBJECTIVE:  Visit Vitals  /78 (BP Location: Right arm, Patient Position: Sitting, Cuff Size: Adult)   Pulse 76   Ht 154.9 cm (61\")   Wt 86.6 kg (191 lb)   SpO2 99%   BMI 36.09 kg/m²      Physical Exam  Vitals and nursing note reviewed.   Constitutional:       General: She is not in acute distress.     Appearance: Normal appearance. She is not ill-appearing, toxic-appearing or diaphoretic.   HENT:      Head: Normocephalic and atraumatic.        Comments: Right maxillary region swollen without erythema/cellulitis. Tender right maxillary region. No jaw " crepitus or tenderness.      Right Ear: Ear canal and external ear normal. There is no impacted cerumen.      Left Ear: Ear canal and external ear normal. There is no impacted cerumen.      Ears:      Comments: Both TM's mildly effused, no erythema.     Nose: Nose normal. No congestion.      Mouth/Throat:      Pharynx: Oropharynx is clear. No oropharyngeal exudate or posterior oropharyngeal erythema.   Eyes:      General: No scleral icterus.        Right eye: No discharge.         Left eye: No discharge.      Conjunctiva/sclera: Conjunctivae normal.      Pupils: Pupils are equal, round, and reactive to light.   Cardiovascular:      Rate and Rhythm: Normal rate.   Pulmonary:      Effort: Pulmonary effort is normal. No respiratory distress.   Abdominal:      General: There is no distension.      Palpations: Abdomen is soft.      Tenderness: There is no abdominal tenderness.   Musculoskeletal:      Cervical back: Neck supple.      Right lower leg: No edema.      Left lower leg: No edema.   Lymphadenopathy:      Cervical: Cervical adenopathy (mild right submandibular) present.   Skin:     General: Skin is warm and dry.   Neurological:      Mental Status: She is alert and oriented to person, place, and time.   Psychiatric:         Mood and Affect: Mood normal.         Behavior: Behavior normal.         Thought Content: Thought content normal.         Judgment: Judgment normal.       [facility]      Assessment/Plan    Diagnoses and all orders for this visit:    1. Subacute maxillary sinusitis (Primary)  -     fluticasone (FLONASE) 50 MCG/ACT nasal spray; 2 sprays into the nostril(s) as directed by provider Daily.  Dispense: 16 g; Refill: 0  -     doxycycline (Vibramycin) 100 MG capsule; Take 1 capsule by mouth 2 (Two) Times a Day.  Dispense: 28 capsule; Refill: 0  -     CT Sinus Without Contrast; Future    2. Gross hematuria  -     Urinalysis With Culture If Indicated -; Future    3. Facial swelling  -     CT Sinus Without  Contrast; Future      Cautioned about photosensitivity on doxycycline.Consider ENT referral.          Education materials and an After Visit Summary were printed and given to the patient at discharge.  No follow-ups on file.         Mercedes Cai, REMI   15:28 CDT  9/19/2023

## 2023-09-20 ENCOUNTER — TELEPHONE (OUTPATIENT)
Dept: INTERNAL MEDICINE | Facility: CLINIC | Age: 38
End: 2023-09-20
Payer: COMMERCIAL

## 2023-09-20 ENCOUNTER — TELEPHONE (OUTPATIENT)
Dept: OBSTETRICS AND GYNECOLOGY | Facility: CLINIC | Age: 38
End: 2023-09-20
Payer: COMMERCIAL

## 2023-09-20 LAB — BACTERIA SPEC AEROBE CULT: ABNORMAL

## 2023-09-20 NOTE — TELEPHONE ENCOUNTER
Caller:     Kristen Christie       Relationship: SELF     Best call back number:     [phone] (Mobile)       Caller requesting test results: PATIENT     What test was performed: X RAY OF JAW     When was the test performed: 09/19/23    Where was the test performed: [facility]     Additional notes: STATES FORGOT TO ASK ABOUT THE SINUS X RAY RESULTS TO CHECK FOR A BLOCKAGE

## 2023-09-20 NOTE — TELEPHONE ENCOUNTER
Caller: Kristen Christie    Relationship: Self    Best call back number: [phone]     Caller requesting test results: SELF    What test was performed: URANALYSIS AND CT SCAN    When was the test performed: 09.19.23    Where was the test performed: [facility]     Additional notes: PLEASE CALL BACK WITH RESULTS.

## 2023-09-20 NOTE — TELEPHONE ENCOUNTER
Pt called stating that she has had some irregular bleeding the past few days. Pt had an appt for today for evaluation with Rosario but pt cancelled it because her PCP could get her in sooner. Pt states her PCP has found that she has a UTI however pt states for the past 3 days she has been spotting when she uses the restroom and today is bleeding in between restroom visits. Pt states she typically has a regular cycle but this bleeding is 2 weeks early. Pt advised to continue to monitor symptoms and if she continues to have irregular bleeding and issues after the UTI is treated to call our office. Pt voiced understanding

## 2023-10-02 ENCOUNTER — HOSPITAL ENCOUNTER (OUTPATIENT)
Dept: CT IMAGING | Facility: HOSPITAL | Age: 38
Discharge: HOME | End: 2023-10-02
Admitting: NURSE PRACTITIONER
Payer: COMMERCIAL

## 2023-10-02 DIAGNOSIS — J01.00 SUBACUTE MAXILLARY SINUSITIS: ICD-10-CM

## 2023-10-02 DIAGNOSIS — R22.0 FACIAL SWELLING: ICD-10-CM

## 2023-10-02 LAB — CREAT BLDA-MCNC: 1 MG/DL (ref 0.6–1.3)

## 2023-10-02 PROCEDURE — 70486 CT MAXILLOFACIAL W/O DYE: CPT

## 2023-10-02 PROCEDURE — 82565 ASSAY OF CREATININE: CPT

## 2023-10-03 DIAGNOSIS — J34.2 DEVIATED SEPTUM: ICD-10-CM

## 2023-10-03 DIAGNOSIS — R22.0 FACIAL SWELLING: Primary | ICD-10-CM

## 2023-10-03 DIAGNOSIS — J32.0 CHRONIC MAXILLARY SINUSITIS: ICD-10-CM

## 2023-10-23 ENCOUNTER — TELEPHONE (OUTPATIENT)
Dept: INTERNAL MEDICINE | Facility: CLINIC | Age: 38
End: 2023-10-23

## 2023-10-23 DIAGNOSIS — J45.909 ASTHMA DUE TO SEASONAL ALLERGIES: ICD-10-CM

## 2023-10-23 DIAGNOSIS — F32.A ANXIETY AND DEPRESSION: ICD-10-CM

## 2023-10-23 DIAGNOSIS — F41.9 ANXIETY AND DEPRESSION: ICD-10-CM

## 2023-10-23 RX ORDER — LORATADINE 10 MG/1
10 TABLET ORAL DAILY
Qty: 30 TABLET | Refills: 5 | Status: SHIPPED | OUTPATIENT
Start: 2023-10-23

## 2023-10-23 RX ORDER — FLUOXETINE 10 MG/1
10 CAPSULE ORAL DAILY
Qty: 30 CAPSULE | Refills: 5 | Status: SHIPPED | OUTPATIENT
Start: 2023-10-23

## 2023-10-23 NOTE — TELEPHONE ENCOUNTER
Caller: Kristen Christie    Relationship: Self    Best call back number: [phone]     What medication are you requesting: NOSE OINTMENT    What are your current symptoms: MRSA FLARE UP    How long have you been experiencing symptoms: 4 OR 5 DAYS    Have you had these symptoms before:    [x] Yes  [] No    Have you been treated for these symptoms before:   [x] Yes  [] No    If a prescription is needed, what is your preferred pharmacy and phone number: [facility], [facility] - [place], [place] - [address] [place] [address] - [phone] [facility] [phone]      Additional notes: PATIENT STATES THIS WAS CALLED IN FOR HER A COUPLE YEARS AGO AND WORKED WELL. PATIENT WOULD LIKE THE SAME THING CALLED IN AGAIN. PLEASE CALL BACK WHEN SOMETHING IS CALLED IN.            Color consistent with ethnicity/race, warm, dry intact, resilient.

## 2023-10-31 ENCOUNTER — TELEPHONE (OUTPATIENT)
Dept: OTOLARYNGOLOGY | Facility: CLINIC | Age: 38
End: 2023-10-31
Payer: COMMERCIAL

## 2023-10-31 NOTE — TELEPHONE ENCOUNTER
Left  with appt change details. Rescheduled her appt on 11-6-23 at 11:15 to 11-9-23 at 10:30. Requested call back on my direct line to confirm this appt change works for her.

## 2023-11-10 ENCOUNTER — TELEPHONE (OUTPATIENT)
Dept: OTOLARYNGOLOGY | Facility: CLINIC | Age: 38
End: 2023-11-10
Payer: COMMERCIAL

## 2023-11-10 NOTE — TELEPHONE ENCOUNTER
Caller: Kristen Christie    Relationship to patient: Self    Best call back number: [phone]    Chief complaint: WOULD LIKE SOONER APPT THAN WHAT IS AVAILABLE    Type of visit: NEW PATIENT    Requested date: THIS MONTH    If rescheduling, when is the original appointment: 11/6/23 11:15AM (CANCELLED BY OFFICE)    Additional notes: OK TO LEAVE VM

## 2023-12-04 ENCOUNTER — TELEPHONE (OUTPATIENT)
Dept: OTOLARYNGOLOGY | Facility: CLINIC | Age: 38
End: 2023-12-04

## 2023-12-04 NOTE — TELEPHONE ENCOUNTER
Caller: Kristen Christie    Relationship to patient: Self    Best call back number: [phone]    Chief complaint: NEW PT APPT FOR DEVIATED SEPTUM    Type of visit: NEW PT    Requested date: ASAP     If rescheduling, when is the original appointment: 11/6 AND 11/9    Additional notes:PT ORIGINAL APPT WAS SCHEDULED 11/6/23 AND WAS CANCELLED BY THE PROVIDER.  PT APPT WAS RESCHEDULED TO 11/9 HOWEVER SHE COULD NOT MAKE THAT APPT.  PT NEEDS TO RESCHEDULE APPT BUT CANNOT WAIT TO BE SEEN AT FIRST AVAILABLE APPT 1/16/24. PT WAS ADVISED SHE WAS TRYING TO BE SCHEDULED FOR 12/15/23 HOWEVER SHE HAS NOT RECVD A CONFIRMATION OF THAT APPT.    PT ADVISED SHE CAN SEE ANY PROVIDER.  PLEASE CALL TO CONFIRM APPT DATE AND TIME.

## 2023-12-06 ENCOUNTER — TELEPHONE (OUTPATIENT)
Age: 38
End: 2023-12-06
Payer: COMMERCIAL

## 2023-12-11 ENCOUNTER — OFFICE VISIT (OUTPATIENT)
Dept: OTOLARYNGOLOGY | Facility: CLINIC | Age: 38
End: 2023-12-11
Payer: COMMERCIAL

## 2023-12-11 VITALS
DIASTOLIC BLOOD PRESSURE: 82 MMHG | BODY MASS INDEX: 36.63 KG/M2 | WEIGHT: 194 LBS | HEIGHT: 61 IN | TEMPERATURE: 98.2 F | HEART RATE: 66 BPM | SYSTOLIC BLOOD PRESSURE: 114 MMHG

## 2023-12-11 DIAGNOSIS — H91.93 CHANGE IN HEARING, BILATERAL: ICD-10-CM

## 2023-12-11 DIAGNOSIS — H69.93 ETD (EUSTACHIAN TUBE DYSFUNCTION), BILATERAL: Primary | ICD-10-CM

## 2023-12-11 DIAGNOSIS — J34.2 NASAL SEPTAL DEVIATION: ICD-10-CM

## 2023-12-11 DIAGNOSIS — J34.3 NASAL TURBINATE HYPERTROPHY: ICD-10-CM

## 2023-12-11 DIAGNOSIS — H93.8X3 EAR PRESSURE, BILATERAL: ICD-10-CM

## 2023-12-11 DIAGNOSIS — R42 VERTIGO: ICD-10-CM

## 2023-12-11 RX ORDER — FLUTICASONE PROPIONATE 50 MCG
2 SPRAY, SUSPENSION (ML) NASAL DAILY
Qty: 16 G | Refills: 3 | Status: SHIPPED | OUTPATIENT
Start: 2023-12-11

## 2023-12-11 NOTE — PROGRESS NOTES
REMI Houston  [facility]  [address], [address]  [place] [address]  Fax [phone]  Phone [phone]      Visit Type: NEW PATIENT   Chief Complaint   Patient presents with    Sinus Problem     Pt referred for deviated septum.     Ear Problem     Pt complains of when she bends over, she is very dizzy.         Sinus Problem      Kristen Christie is a 38 y.o. female who presents for evaluation of ears and sinus complaints. Admits chronic ear fullness/pressure, occasional muffled hearing, occasional pain with loud noises in ears, dizziness with head position changes and bending over and standing up, has been present for about 6 months. She does describe the dizziness as room spinning to the right and has nausea and difficulty focusing eyes when this develop.       She also states she has facial pressure and pain, intermittent nasal drip and congestion. Uses oral antihistamine, does not use nasal sprays.      Past Medical History:   Diagnosis Date    Abnormal Pap smear of cervix     Anxiety     Arthritis     Asthma     Cervical dysplasia     COVID-19 virus infection 07/2021    again in 1/22    Depression     Polycystic ovary syndrome     Seasonal allergies        Past Surgical History:   Procedure Laterality Date    CERVICAL BIOPSY  W/ LOOP ELECTRODE EXCISION      COLPOSCOPY      LEEP  2015    TONSILLECTOMY  2004    WISDOM TOOTH EXTRACTION  2004       Family History: Her family history includes Breast cancer (age of onset: 60) in her maternal grandmother; Diabetes in her father and paternal grandmother; Hypertension in her brother, father, paternal grandmother, and sister; Ovarian cancer in her maternal great-grandmother; Stroke in her maternal grandfather, maternal grandmother, mother, and paternal grandmother; Thyroid disease in her mother.     Social History: She  reports that she quit smoking about 11 years ago. Her smoking use  included cigarettes. She has never used smokeless tobacco. She reports that she does not currently use alcohol. She reports that she does not use drugs.    Home Medications:  FLUoxetine, busPIRone, loratadine, mupirocin, nystatin, and phentermine    Allergies:  She has No Known Allergies.       Vital Signs:   Temp:  [98.2 °F (36.8 °C)] 98.2 °F (36.8 °C)  Heart Rate:  [66] 66  BP: (114)/(82) 114/82  ENT Physical Exam  Constitutional  Appearance: patient appears well-developed, well-nourished and well-groomed,  Communication/Voice: communication appropriate for developmental age; vocal quality normal;  Head and Face  Appearance: head appears normal, face appears normal and face appears atraumatic;  Palpation: facial palpation normal;  Ear  Hearing: intact;  Auricles: bilateral auricles normal;  External Mastoids: bilateral external mastoids normal;  Ear Canals: bilateral ear canals normal;  Tympanic Membranes: bilateral tympanic membranes normal;  Nose  External Nose: nares patent bilaterally; external nose normal;  Internal Nose: bilateral intranasal mucosa crusting noted; nasal septal deviation present; deviation is with left spur septal deviation is intermediate; bilateral inferior turbinates erythematous; with hypertrophy and crusting;  Oral Cavity/Oropharynx  Lips: normal;  Teeth: normal;  Gums: gingiva normal;  Tongue: normal;  Oral mucosa: normal;  Hard palate: normal;  Soft palate: normal;  Tonsils: normal;           Result Review    RESULTS REVIEW    I have reviewed the patients old records in the chart.      Assessment & Plan  ETD (Eustachian tube dysfunction), bilateral    Vertigo    Ear pressure, bilateral    Change in hearing, bilateral    Nasal septal deviation    Nasal turbinate hypertrophy              Medical and surgical options were discussed including observation, continued medical management, medication modification, and surgical management. Risks, benefits and alternatives were discussed and  questions were answered. After considering the options, the patient decided to proceed with medication modification.  Call for ear problems, especially change of hearing, ear pain or dizziness.  For the best results, use nasal sprays every day. It may take a week to build up in the nasal lining and a few more weeks to improve the eustachian tube function.  Pop Ears  Use hearing protection in loud noise situations.  Follow a low salt diet to try to prevent inner ear fluid accumulation.  Decrease caffeine, avoid red wine, nitrites in cured meats, food additives (like monosodium glutamate/ MSG) and dyes.  Vestibular home exercises- start  Audio test, dean-hallpike with epley at follow up  Saline nasal spray or saline gel to nose three times a day and as needed. Use a bedside humidifier at night. Place Vasoline in nose in the morning and at night.  Place Vasoline in nose in the morning and at night on the center part of the nose (septum) at least 15 minutes prior to the nasal spray. When using the nasal spray, aim towards the outer corner of the eye.   GENERAL ALLERGY AVOIDANCE: Regular vacuum cleaning is  recommended  to prevent accumulation of allergens. Use adequate filtration, including double thickness bags or HEPA filters on the  outlet. Use air-conditioning where possible. Change central air filters with an allergy rated filter on a regular basis. Install car pollen filters where possible.     Will consider possible CT of sinuses at follow up  If dizziness worsens of fails to improve may also consider MRI of brain and IACs or further vestibular testing    Call with questions or problems    Return in about 6 weeks (around 1/22/2024) for Recheck .    Electronically signed by REMI Houston 12/11/23 2:01 PM CST.

## 2023-12-11 NOTE — PATIENT INSTRUCTIONS
Nasal steroid use:  Using nasal steroids:  You will be prescribed one of the following nasal steroids: Flonase, Nasacort, Nasonex, Rhinocort, Qnasl, Zetonna  2 puffs each nostril 2 times daily  Start as soon as possible  If you are using Afrin for 3 days with the nasal steroid,  Use Afrin first and wait 10 minutes to allow the nose to open. Then administer nasal steroids.   NASAL SALINE:  Use 2 puffs each nostril 4-6 times daily and more frequently if possible.  You can buy saline spray or you can make your own and use an old spray bottle to administer  Use a humidifier at bedside  Recipe for saline:  Water                                 1 quart  Salt (table)                        1 tablespoon  Gylcerin (or Traci Syrup)    1 teaspoon  Sodium bicarbonate           1 teaspoon  Sprays or Roscoe pots are recommended    Do not allow to stand for more than 24 hrs. Make new solution. There is no preservative in this solution.    Sinus irrigation and saline application can be enhanced with various over-the-counter products.  A WaterPik can be quite useful to irrigate, especially following sinus surgery.  Navage makes a product that irrigates the nose and some of the sinuses.  NeilMed makes a sign you Gator to irrigate the sinuses.  Neomed also has canned saline that we will come out under pressure.  A Kaya pot can also be helpful.  All of these products help keep the nose clear of debris.  Please use as directed on the instructions that come with the particular device.     How to pop ears:  Pop your ears by holding nose and closing mouth, then blow hard until ears pop  Children (less than 8-9 years)- blow up ballons 4 times a day and more frequently     VESTIBULAR EXERCISES:    Goals:  >To develop proprioceptive and visual mechanisms to compensate for a disturbance in balance function (labyrinthine system)  >To improve muscle coordination in general  T>o practice balancing under everyday conditions with special attention  "to using the eyes,  muscle and joint senses  >To train the movement of the eyes independent of the head  >To loosen the muscles of the neck and shoulder to overcome the protective muscular spasm and tendency to move \"in one piece\"  >To practice head movements that cause dizziness and thus gradually overcome the disability  >To encourage the restoration of self-confidence and easy spontaneous motion     Exercise Program:  A. Eye exercises (while seated in bed). Perform 5 to 10 minutes at least 5 times each day; first do slowly, then quickly. Perform 20 times each.  1.     Up and down  2.     Side to side  3.     Diagonal  4.     Focus on finger moving from 3 feet to one foot from face    B. Head exercises. To be done at first slowly with eyes open in primary position, then quickly. Later do with eyes closed. Perform 20 times each.  1.     Bending forward and backward  2.     Turning from side to side  3.     Tilting from side to side  4.     Diagonal movements    C. Coordinate the movement of head and eyes in the same direction as in B.    D. Shoulder shrugging and circling. Perform 20 times each.    E. While seated, bend forward and  objects from the ground. Perform 20 times.    F.  Standing exercises. Perform 20 times each.  1. Repeat section A  2. Change from a sitting to a standing position with the eyes open and shut.  3. Throw ball from hand to hand, above eye level             4. Throw ball from hand to hand, under knee  5. Change form from sitting to standing, turning around in between     Full activity: Perform 10 times each.  1. Walk across room with eyes open, then closed  2. Walk up and down a slope with eyes open, then closed  3. Walk up and down steps with eyes open, then closed  4. Sit up and lie down in bed  5. Stand up and sit down in a chair  6. Recover balance when pushed in any direction  7. Throw and catch a ball  8. Any game involving stooping, straining and aiming     The following are " of value in rehabilitating patients with balance problems:  1. A light cane may be used, not for walking, but to provide additional proprioceptive            orienting input.  2. While walking, the patient should not look down, but rather select a distant point for          fixation.  3. Night-lights should be left on in the patient’s home.  4. In-patients with cervical spine problems, especially in those that head turning        increase unsteadiness, the use of soft foam cervical collar limits motion and improves         stability. Exercises should be modified accordingly.  5. Mild central stimulants (Ritalin, Caffeine) are useful in alerting the patient to respond       quickly to orienting input.  6. Optimal visual correction should be achieved and maintained. If cataract surgery is           being performed, then contact lens are recommended to avoid optical distortion.  7.  Extreme fatigue and stress is to be avoided, as this may worsen the dizziness.  8.  Sedatives, vestibular suppressants and tranquilizers (Valium, Phenergan, Antivert, Dramamine, Klonopin, etc.) are to be avoided, as this may slow compensation by the brain and cause drowsiness.

## 2023-12-15 ENCOUNTER — PATIENT ROUNDING (BHMG ONLY) (OUTPATIENT)
Dept: OTOLARYNGOLOGY | Facility: CLINIC | Age: 38
End: 2023-12-15
Payer: COMMERCIAL

## 2023-12-15 NOTE — PROGRESS NOTES
December 15, 2023    Hello, may I speak with Kristen Christie?    My name is Rocío      I am  with [facility]  [address], [address]  [place] [address] [phone].    Before we get started may I verify your date of birth? 1985    I am calling to officially welcome you to our practice and ask about your recent visit. Is this a good time to talk? yes    Tell me about your visit with us. What things went well?  Everything went well.       We're always looking for ways to make our patients' experiences even better. Do you have recommendations on ways we may improve?  no    Overall were you satisfied with your first visit to our practice? yes       I appreciate you taking the time to speak with me today. Is there anything else I can do for you? no      Thank you, and have a great day.

## 2024-01-05 ENCOUNTER — TELEPHONE (OUTPATIENT)
Dept: OTOLARYNGOLOGY | Facility: CLINIC | Age: 39
End: 2024-01-05
Payer: COMMERCIAL

## 2024-01-05 NOTE — TELEPHONE ENCOUNTER
I spoke to patient, rescheduled appt 1-24-24 at 2:00 for audio and see Markie Hargrove at 2:30. She VU

## 2024-01-17 ENCOUNTER — TELEPHONE (OUTPATIENT)
Dept: INTERNAL MEDICINE | Facility: CLINIC | Age: 39
End: 2024-01-17

## 2024-01-19 ENCOUNTER — OFFICE VISIT (OUTPATIENT)
Dept: INTERNAL MEDICINE | Facility: CLINIC | Age: 39
End: 2024-01-19
Payer: COMMERCIAL

## 2024-01-19 VITALS
SYSTOLIC BLOOD PRESSURE: 144 MMHG | OXYGEN SATURATION: 98 % | TEMPERATURE: 98.1 F | WEIGHT: 198 LBS | HEIGHT: 61 IN | BODY MASS INDEX: 37.38 KG/M2 | HEART RATE: 72 BPM | DIASTOLIC BLOOD PRESSURE: 92 MMHG

## 2024-01-19 DIAGNOSIS — F41.9 ANXIETY AND DEPRESSION: Primary | ICD-10-CM

## 2024-01-19 DIAGNOSIS — F32.A ANXIETY AND DEPRESSION: Primary | ICD-10-CM

## 2024-01-19 DIAGNOSIS — R03.0 ELEVATED BLOOD PRESSURE READING: ICD-10-CM

## 2024-01-19 PROCEDURE — 99214 OFFICE O/P EST MOD 30 MIN: CPT | Performed by: NURSE PRACTITIONER

## 2024-01-19 RX ORDER — CITALOPRAM 20 MG/1
20 TABLET ORAL DAILY
Qty: 90 TABLET | Refills: 1 | Status: SHIPPED | OUTPATIENT
Start: 2024-01-19

## 2024-01-19 NOTE — PATIENT INSTRUCTIONS
Discontinue Fluoxetine, after one week, start Celexa. If you have any withdrawal symptoms, take every other day  2. Celexa 20 mg once/day  3. Mydeo ordered, we will make sure it's covered by insurance first  4. Check BP about once/day  5. Check the My[employer]o website for cost, phone number is [phone]

## 2024-01-19 NOTE — PROGRESS NOTES
REMI Chowdhury  [facility]   Family Medicine  [address]  [place] [address]  Phone: [phone]  Fax: [phone]         Chief Complaint:  Chief Complaint   Patient presents with    Depression        History:  Kristen Christie is a 38 y.o. female that is an established patient. She  is here for evaluation of the above complaint.    HPI     Kristen is here in return, continues with depressive episodes every 6-8 weeks, lasting 6-8 weeks. She sees a therapist, Megan at [facility] once a week for the last 3-4 months. She is on Fluoxetine 10 mg, and had terrible dreams on 20 mg, so returned to 10 mg. She is interested in changing medications. She does well on Buspirone 10mg once/day, sometimes taking a second dose depending on her anxiety. She denies SI, HI.    She is interested in the Genesite testing, but I provided her with the phone number and website so she can explore the cost of it first.    Blood pressure is elevated today at 144/92, 138/90 on recheck. She states she hasn't had elevated blood pressure in the past. She will check her BP at home.               ROS:  Review of Systems   HENT: Negative.     Respiratory: Negative.     Cardiovascular: Negative.    Psychiatric/Behavioral:  Positive for dysphoric mood. The patient is nervous/anxious.          reports that she quit smoking about 12 years ago. Her smoking use included cigarettes. She has a 10.00 pack-year smoking history. She has never used smokeless tobacco. She reports that she does not currently use alcohol. She reports that she does not use drugs.    Current Outpatient Medications   Medication Instructions    busPIRone (BUSPAR) 5-10 mg, Oral, Daily    citalopram (CELEXA) 20 mg, Oral, Daily    fluticasone (FLONASE) 50 MCG/ACT nasal spray 2 sprays, Nasal, Daily    loratadine (CLARITIN) 10 mg, Oral, Daily    mupirocin (BACTROBAN) 2 % nasal ointment Apply topically to the appropriate area as directed Daily for 7  "days.  Apply inside nose daily for 7 days repeat each month for 3 months.    nystatin (MYCOSTATIN) 685408 UNIT/GM powder Topical, 4 Times Daily       OBJECTIVE:  /92 (BP Location: Left arm, Patient Position: Sitting, Cuff Size: Adult)   Pulse 72   Temp 98.1 °F (36.7 °C) (Oral)   Ht 154.9 cm (61\")   Wt 89.8 kg (198 lb)   SpO2 98%   BMI 37.41 kg/m²    Physical Exam  Vitals and nursing note reviewed.   Constitutional:       Appearance: Normal appearance.   Eyes:      Conjunctiva/sclera: Conjunctivae normal.   Cardiovascular:      Rate and Rhythm: Normal rate and regular rhythm.   Pulmonary:      Effort: Pulmonary effort is normal.      Breath sounds: Normal breath sounds.   Neurological:      General: No focal deficit present.      Mental Status: She is alert and oriented to person, place, and time.   Psychiatric:         Attention and Perception: Attention and perception normal.         Mood and Affect: Mood and affect normal.         Speech: Speech normal.         Behavior: Behavior normal. Behavior is cooperative.         Thought Content: Thought content normal.         Cognition and Memory: Cognition and memory normal.         Judgment: Judgment normal.         Procedures    Assessment/Plan:     Diagnoses and all orders for this visit:    1. Anxiety and depression (Primary)  -     citalopram (CeleXA) 20 MG tablet; Take 1 tablet by mouth Daily.  Dispense: 90 tablet; Refill: 1    2. Elevated blood pressure reading      Class 2 Severe Obesity (BMI >=35 and <=39.9). Obesity-related health conditions include the following: none. Obesity is unchanged. BMI is is above average; BMI management plan is completed. We discussed portion control and increasing exercise.       An After Visit Summary was printed and given to the patient at discharge.  Return for Next scheduled follow up.       Patient Instructions   Discontinue Fluoxetine, after one week, start Celexa. If you have any withdrawal symptoms, take every " other day  2. Celexa 20 mg once/day  3. GeneSite ordered, we will make sure it's covered by insurance first  4. Check BP about once/day  5. Check the [employer] website for cost, phone number is [phone]      Discussion:     Long discussion about medications, [place]ell    I spent 32 minutes caring for Kristen on this date of service. This time includes time spent by me in the following activities: preparing for the visit, reviewing tests, obtaining and/or reviewing a separately obtained history, performing a medically appropriate examination and/or evaluation, counseling and educating the patient/family/caregiver, ordering medications, tests, or procedures, documenting information in the medical record, and independently interpreting results and communicating that information with the patient/family/caregiver     Angie KHALIL 1/19/2024   Electronically signed.  Answers submitted by the patient for this visit:  Other (Submitted on 1/19/2024)  Please describe your symptoms.: Depression - would like genesite testing for medication help  Have you had these symptoms before?: Yes  How long have you been having these symptoms?: Greater than 2 weeks  Please list any medications you are currently taking for this condition.: Prozac  Primary Reason for Visit (Submitted on 1/19/2024)  What is the primary reason for your visit?: Other

## 2024-01-24 ENCOUNTER — PROCEDURE VISIT (OUTPATIENT)
Dept: OTOLARYNGOLOGY | Facility: CLINIC | Age: 39
End: 2024-01-24
Payer: COMMERCIAL

## 2024-01-24 ENCOUNTER — OFFICE VISIT (OUTPATIENT)
Dept: OTOLARYNGOLOGY | Facility: CLINIC | Age: 39
End: 2024-01-24
Payer: COMMERCIAL

## 2024-01-24 VITALS
DIASTOLIC BLOOD PRESSURE: 97 MMHG | HEART RATE: 63 BPM | SYSTOLIC BLOOD PRESSURE: 144 MMHG | BODY MASS INDEX: 36.63 KG/M2 | HEIGHT: 61 IN | WEIGHT: 194 LBS | TEMPERATURE: 97.8 F

## 2024-01-24 DIAGNOSIS — Z01.10 HEARING WITHIN NORMAL LIMITS IN BOTH EARS: Primary | ICD-10-CM

## 2024-01-24 DIAGNOSIS — R42 DIZZINESS: Primary | ICD-10-CM

## 2024-01-24 DIAGNOSIS — H69.93 ETD (EUSTACHIAN TUBE DYSFUNCTION), BILATERAL: ICD-10-CM

## 2024-01-24 DIAGNOSIS — J34.3 NASAL TURBINATE HYPERTROPHY: ICD-10-CM

## 2024-01-24 DIAGNOSIS — J34.2 NASAL SEPTAL DEVIATION: ICD-10-CM

## 2024-01-24 DIAGNOSIS — R42 DIZZINESS: ICD-10-CM

## 2024-01-24 DIAGNOSIS — H93.8X3 EAR PRESSURE, BILATERAL: ICD-10-CM

## 2024-01-24 NOTE — PATIENT INSTRUCTIONS
Nasal steroid use:  Using nasal steroids:  You will be prescribed one of the following nasal steroids: Flonase, Nasacort, Nasonex, Rhinocort, Qnasl, Zetonna  2 puffs each nostril 2 times daily  Start as soon as possible  If you are using Afrin for 3 days with the nasal steroid,  Use Afrin first and wait 10 minutes to allow the nose to open. Then administer nasal steroids.     How to pop ears:  Pop your ears by holding nose and closing mouth, then blow hard until ears pop  Children (less than 8-9 years)- blow up ballons 4 times a day and more frequently     NASAL SALINE:  Use 2 puffs each nostril 4-6 times daily and more frequently if possible.  You can buy saline spray or you can make your own and use an old spray bottle to administer  Use a humidifier at bedside  Recipe for saline:  Water                                 1 quart  Salt (table)                        1 tablespoon  Gylcerin (or Traci Syrup)    1 teaspoon  Sodium bicarbonate           1 teaspoon  Sprays or Kaya pots are recommended    Do not allow to stand for more than 24 hrs. Make new solution. There is no preservative in this solution.    Sinus irrigation and saline application can be enhanced with various over-the-counter products.  A WaterPik can be quite useful to irrigate, especially following sinus surgery.  Navage makes a product that irrigates the nose and some of the sinuses.  NeilMed makes a sign you Gator to irrigate the sinuses.  Neomed also has canned saline that we will come out under pressure.  A Pingree pot can also be helpful.  All of these products help keep the nose clear of debris.  Please use as directed on the instructions that come with the particular device.

## 2024-01-24 NOTE — PROGRESS NOTES
"AUDIOMETRIC EVALUATION      Name:  Kristen Christie  :  1985  Age:  38 y.o.  Date of Evaluation:  2024       History:  Ms. Christie is seen today for a hearing evaluation due to dizziness at the request of REMI Xiao.    Audiologic Information:  Concerns for Hearing: No  PETs: No  Other otologic surgical history: No  Aural Pressure/Fullness: Bilateral  Otalgia: Bilateral  Otorrhea: No  Tinnitus: No  Dizziness: intermittent lightheaded/off-balance \"hangover\" feeling; denies any spinning  Noise Exposure: power tools  Family history of hearing loss: No  Head trauma requiring hospital stay: No  Chemotherapy: No  Other significant history: None    Vestibular Information:  Duration: Days  Triggers: bending over, moving quickly, denies any feelings when turning head right and left or rolling over in bed  Heart Problems: No  Back/Neck Problems: No  Vision Problems: None  Nausea: Yes  Weakness/Numbness: No  Motion Sickness: No  Migraine/Headache: Regular headaches  Falls: No    **Case history obtained in office and through EMR system      EVALUATION:        RESULTS:    Otoscopic Evaluation:  Right: clear canal, tympanic membrane visualized  Left: clear canal, tympanic membrane visualized  **NOTE: Testing completed after ears were examined by ENT provider     Tympanometry (226 Hz):  Right: Type B, Normal ECV- Discernable peak noted  Left: Type A    Pure Tone Audiometry:    Right: Grossly normal hearing thresholds (mild loss at 250Hz)  Left: Normal hearing thresholds    Speech Audiometry:   Right: Speech Reception Threshold (SRT) was obtained at 10 dB HL  Word Recognition scores - Excellent (100)% at 50 dB, using NU-6 List 2A with 10 words  Left: Speech Reception Threshold (SRT) was obtained at 10 dB HL  Word Recognition scores - Excellent (100)% at 50 dB, using NU-6 List 2A with 10 words  SRT/PTA in good agreement bilaterally.    Positional Screening:    Spontaneous Nystagmus Normal - No nystagmus observed or " recorded   [place]-Hallpike Negative   Left No nystagmus observed or recorded   Right No nystagmus observed or recorded   Positional Normal   Supine No nystagmus observed or recorded   Head Right No nystagmus observed or recorded   Head Left No nystagmus observed or recorded       IMPRESSIONS:  Tympanometry showed normal middle ear pressure and static compliance, consistent with normal middle ear function for the left ear. Tympanometry showed no measurable middle ear pressure or static compliance, consistent with middle ear pathology, for the right ear. Pure tone thresholds for both ears show grossly normal hearing, suggesting normal outer/middle ear function and normal cochlear/retrocochlear function. Patient was counseled with regard to the findings.    Diagnosis:  1. Hearing within normal limits in both ears    2. Dizziness         RECOMMENDATIONS/PLAN:  Follow-up recommendations per REMI Xiao.  Practice good communication strategies to assist with everyday listening (eye contact with speakers, reduce background noise, encourage others to communicate clearly and slowly).  Repeat hearing evaluation if changes in hearing are noted or concerns arise.  Follow-up if vestibular symptoms persist  Discussed results and recommendations with patient. Questions were addressed and the patient was encouraged to contact our department should concerns arise.        Trina Espinosa, CCC-A, F-AAA  Doctor of Audiology

## 2024-01-24 NOTE — PROGRESS NOTES
"    REMI Houston  [facility]  [address], [address]  [facility] [address]  Fax [phone]  Phone [phone]      Visit Type: FOLLOW UP   Chief Complaint   Patient presents with    Dizziness    recheck ears           HPI  Kristen Christie is a 38 y.o. female who presents for follow up evaluation for ear and sinus complaints. Admits chronic ear fullness/pressure, occasional muffled hearing, occasional pain with loud noises in ears, dizziness with head position changes and bending over and standing up, has been present for about 8 months.     She describes dizziness as two different experiences, states with bending over and head position changes she describe the dizziness as room spinning to the right and has nausea and difficulty focusing eyes when this develop but today states this does not occur often now.   Her main report of dizziness she now describes as lightheaded and \"foggy\" sensation that is fairly constant and sometimes feels like she cannot focus her eyes or tract with her eyes. No known improving factors for this. Unsure of exact triggers or exacerbation factors.     Sinus symptoms include facial pressure and pain, intermittent nasal congestion and drainage. The facial pressure she reports is mostly around left frontal area and left eye.     She has been using flonase BID, claritin daily and vestibular exercises since last visit, states may have somewhat less ear pain but no other improvement in symptoms.         Past Medical History:   Diagnosis Date    Abnormal Pap smear of cervix     Anxiety     Arthritis     Asthma     Cervical dysplasia     COVID-19 virus infection 07/2021    again in 1/22    Depression     Polycystic ovary syndrome     Seasonal allergies        Past Surgical History:   Procedure Laterality Date    CERVICAL BIOPSY  W/ LOOP ELECTRODE EXCISION      COLPOSCOPY      LEEP  2015    TONSILLECTOMY  2004    " WISDOM TOOTH EXTRACTION  2004       Family History: Her family history includes Breast cancer (age of onset: 60) in her maternal grandmother; Diabetes in her father and paternal grandmother; Hypertension in her brother, father, paternal grandmother, and sister; Ovarian cancer in her maternal great-grandmother; Stroke in her maternal grandfather, maternal grandmother, mother, and paternal grandmother; Thyroid disease in her mother.     Social History: She  reports that she quit smoking about 12 years ago. Her smoking use included cigarettes. She has a 10.00 pack-year smoking history. She has never used smokeless tobacco. She reports that she does not currently use alcohol. She reports that she does not use drugs.    Home Medications:  busPIRone, citalopram, fluticasone, loratadine, mupirocin, and nystatin    Allergies:  She has No Known Allergies.       Vital Signs:   Temp:  [98.2 °F (36.8 °C)] 98.2 °F (36.8 °C)  Heart Rate:  [70] 70  BP: (120)/(82) 120/82  ENT Physical Exam  Constitutional  Appearance: patient appears well-developed, well-nourished and well-groomed,  Communication/Voice: communication appropriate for developmental age; vocal quality normal;  Head and Face  Appearance: head appears normal, face appears normal and face appears atraumatic;  Palpation: facial palpation normal;  Ear  Hearing: intact;  Auricles: bilateral auricles normal;  External Mastoids: bilateral external mastoids normal;  Ear Canals: bilateral ear canals normal;  Tympanic Membranes: bilateral tympanic membranes normal;  Nose  External Nose: nares patent bilaterally; external nose normal;  Internal Nose: bilateral intranasal mucosa crusting noted; nasal septal deviation present; deviation is to the left, septal deviation is intermediate; bilateral inferior turbinates erythematous; with hypertrophy and crusting;  Oral Cavity/Oropharynx  Lips: normal;  Teeth: normal;  Gums: gingiva normal;  Tongue: normal;  Oral mucosa: normal;  Hard  palate: normal;  Soft palate: normal;  Tonsils: normal;           Result Review       RESULTS REVIEW    I have reviewed the patients old records in the chart.   The following results/records were reviewed:     CT Sinus Without Contrast (10/02/2023 15:23)   Procedure visit with Trina Hunter AUD (01/24/2024)            Assessment & Plan  Dizziness    ETD (Eustachian tube dysfunction), bilateral    Ear pressure, bilateral    Nasal septal deviation    Nasal turbinate hypertrophy                Long discussion held today about symptoms and progression of condition. We reviewed her prior CT scan in office today which showed no evidence of acute or chronic sinusitis. She does have a moderate deviation of the nasal septum to the left but no other significant sinonasal findings.   Her Audio test today shows relatively normal hearing. She has type A tymp on left, type B tymp on right per machine although there is a discernable peak, Word Rec scores are 100% bilaterally. Her otoscopic exam and physical exam today in general is relatively unremarkable.   Reagan Hallpike negative, positional normal.     I discussed case with Dr. Barnes, he did feel that the deviated septum could cause some of her complaints of facial pressure and a turbinate reduction and septoplasty could be considered but may not resolve symptoms if there is other underlying cause. With her complaints of fogginess and lightheaded dizziness there may well be another cause of her symptoms.   We discussed ongoing treatment options including surgery, nasal endoscopy, further imaging including MRI or continued conservative measures.   Before pursuing these other options, she wishes to discuss with PCP again. I advised considering and discussing a headache workup with PCP or possibly neuro referral for these symptoms. We also discussed ophthalmology exam, she sees them yearly and reports she is actually scheduled today to see them. She will discuss symptoms  with them as well.     She will continue current conservative measures as well including continuing flonase, saline use and ear popping. I have advised her to follow up with us again in 3 to 6 months to recheck audio or tymps at least. States she is agreeable to this but will call back to schedule follow up after discussion with PCP to see what they else they recommend.       Medical and surgical options were discussed including observation, continued medical management, medication modification, and surgical management. Risks, benefits and alternatives were discussed and questions were answered. After considering the options, the patient decided to proceed with continued medical management.  Call for ear problems, especially change of hearing, ear pain or dizziness.  For the best results, use nasal sprays every day. It may take a week to build up in the nasal lining and a few more weeks to improve the eustachian tube function.  Protect getting water in the ears. If needed, may use over the counter silicone plugs or a cotton ball coated with vasoline when bathing.  Use hairdryer on a cool setting after bathing.  Use hearing protection in loud noise situations.    Call with questions of concerns    Return in about 3 months (around 4/24/2024) for Recheck.        Electronically signed by REMI Houston 01/24/24 3:20 PM CST.

## 2024-01-26 ENCOUNTER — OFFICE VISIT (OUTPATIENT)
Dept: INTERNAL MEDICINE | Facility: CLINIC | Age: 39
End: 2024-01-26
Payer: COMMERCIAL

## 2024-01-26 VITALS
TEMPERATURE: 98.2 F | WEIGHT: 194 LBS | SYSTOLIC BLOOD PRESSURE: 120 MMHG | HEIGHT: 61 IN | HEART RATE: 70 BPM | DIASTOLIC BLOOD PRESSURE: 82 MMHG | BODY MASS INDEX: 36.63 KG/M2 | OXYGEN SATURATION: 100 %

## 2024-01-26 DIAGNOSIS — R51.9 CHRONIC NONINTRACTABLE HEADACHE, UNSPECIFIED HEADACHE TYPE: Primary | ICD-10-CM

## 2024-01-26 DIAGNOSIS — R03.0 ELEVATED BLOOD PRESSURE READING: ICD-10-CM

## 2024-01-26 DIAGNOSIS — R42 DIZZINESS: ICD-10-CM

## 2024-01-26 DIAGNOSIS — H93.8X3 SENSATION OF FULLNESS IN BOTH EARS: ICD-10-CM

## 2024-01-26 DIAGNOSIS — G89.29 CHRONIC NONINTRACTABLE HEADACHE, UNSPECIFIED HEADACHE TYPE: Primary | ICD-10-CM

## 2024-01-26 PROCEDURE — 99214 OFFICE O/P EST MOD 30 MIN: CPT | Performed by: NURSE PRACTITIONER

## 2024-01-26 NOTE — PROGRESS NOTES
"REMI Chowdhury  [facility]   Family Medicine  [address]  [place] [address]  Phone: [phone]  Fax: [phone]         Chief Complaint:  Chief Complaint   Patient presents with    Dizziness        History:  Kristen Christie is a 38 y.o. female that is an established patient. She  is here for evaluation of the above complaint.    HPI     Kristen is here in follow up, having had workup with ENT for bilateal ear pain, pressure in the frontal sinuses, headaches behind the left eye, and dizziness. 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 ENT workup including CT of the sinuses and ear pressure testing was normal and she was advised to see neurology. She has been on Flonase for 8 weeks with no improvement.     In regard to headache, she experiences no photophobia, some nausea, always behind the left eye. This year at [place], she noticed the [employer]it[employer] lights \"tracing\" when she looked at them, which she hasn't experienced before.    No double vision, no syncope.    Blood pressure is normal today at 120/82. She starts the Celexa today after a washout week off Fluoxetine. She continues with her therapist as well.             ROS:  Review of Systems   Constitutional: Negative.    HENT:  Positive for ear pain and sinus pressure.    Respiratory: Negative.     Cardiovascular: Negative.    Gastrointestinal:  Positive for nausea.   Genitourinary: Negative.    Neurological:  Positive for dizziness and headaches.         reports that she quit smoking about 12 years ago. Her smoking use included cigarettes. She has a 10.00 pack-year smoking history. She has never used smokeless tobacco. She reports that she does not currently use alcohol. She reports that she does not use drugs.    Current Outpatient Medications   Medication Instructions    busPIRone (BUSPAR) 5-10 mg, Oral, Daily    citalopram (CELEXA) 20 mg, Oral, Daily    fluticasone (FLONASE) 50 " "MCG/ACT nasal spray 2 sprays, Nasal, Daily    loratadine (CLARITIN) 10 mg, Oral, Daily    mupirocin (BACTROBAN) 2 % nasal ointment Apply topically to the appropriate area as directed Daily for 7 days.  Apply inside nose daily for 7 days repeat each month for 3 months.    nystatin (MYCOSTATIN) [id number] UNIT/GM powder Topical, 4 Times Daily       OBJECTIVE:  /82 (BP Location: Right arm, Patient Position: Sitting, Cuff Size: Adult)   Pulse 70   Temp 98.2 °F (36.8 °C) (Oral)   Ht 154.9 cm (61\")   Wt 88 kg (194 lb)   SpO2 100%   BMI 36.66 kg/m²    Physical Exam  Vitals and nursing note reviewed.   Constitutional:       Appearance: Normal appearance.   HENT:      Head: Normocephalic and atraumatic.      Nose: Nose normal.   Eyes:      Conjunctiva/sclera: Conjunctivae normal.   Cardiovascular:      Rate and Rhythm: Normal rate and regular rhythm.   Pulmonary:      Effort: Pulmonary effort is normal.      Breath sounds: Normal breath sounds.   Neurological:      General: No focal deficit present.      Mental Status: She is alert and oriented to person, place, and time.   Psychiatric:         Mood and Affect: Mood normal.         Behavior: Behavior normal.         Procedures    Assessment/Plan:     Diagnoses and all orders for this visit:    1. Chronic nonintractable headache, unspecified headache type (Primary)  -     Ambulatory Referral to Neurology    2. Dizziness  -     Ambulatory Referral to Neurology    3. Elevated blood pressure reading    4. Sensation of fullness in both ears      Class 2 Severe Obesity (BMI >=35 and <=39.9). Obesity-related health conditions include the following: none. Obesity is unchanged. BMI is is above average; BMI management plan is completed. We discussed portion control and increasing exercise.       An After Visit Summary was printed and given to the patient at discharge.  No follow-ups on file.       There are no Patient Instructions on file for this visit.      Discussion:     I " spent 32 minutes caring for Kristen on this date of service. This time includes time spent by me in the following activities: preparing for the visit, reviewing tests, obtaining and/or reviewing a separately obtained history, performing a medically appropriate examination and/or evaluation, counseling and educating the patient/family/caregiver, referring and communicating with other health care professionals, documenting information in the medical record, and independently interpreting results and communicating that information with the patient/family/caregiver     Angie KHALIL 1/26/2024   Electronically signed.

## 2024-02-16 RX ORDER — BUSPIRONE HYDROCHLORIDE 10 MG/1
5-10 TABLET ORAL DAILY
Qty: 30 TABLET | Refills: 3 | Status: SHIPPED | OUTPATIENT
Start: 2024-02-16

## 2024-02-22 ENCOUNTER — OFFICE VISIT (OUTPATIENT)
Dept: INTERNAL MEDICINE | Facility: CLINIC | Age: 39
End: 2024-02-22
Payer: COMMERCIAL

## 2024-02-22 VITALS
HEIGHT: 61 IN | TEMPERATURE: 98.4 F | SYSTOLIC BLOOD PRESSURE: 108 MMHG | OXYGEN SATURATION: 97 % | DIASTOLIC BLOOD PRESSURE: 68 MMHG | HEART RATE: 78 BPM | BODY MASS INDEX: 37.76 KG/M2 | WEIGHT: 200 LBS

## 2024-02-22 DIAGNOSIS — F41.9 ANXIETY AND DEPRESSION: ICD-10-CM

## 2024-02-22 DIAGNOSIS — Z00.00 ANNUAL PHYSICAL EXAM: Primary | ICD-10-CM

## 2024-02-22 DIAGNOSIS — G89.29 CHRONIC NONINTRACTABLE HEADACHE, UNSPECIFIED HEADACHE TYPE: ICD-10-CM

## 2024-02-22 DIAGNOSIS — R51.9 CHRONIC NONINTRACTABLE HEADACHE, UNSPECIFIED HEADACHE TYPE: ICD-10-CM

## 2024-02-22 DIAGNOSIS — F32.A ANXIETY AND DEPRESSION: ICD-10-CM

## 2024-02-22 DIAGNOSIS — E66.09 CLASS 2 OBESITY DUE TO EXCESS CALORIES WITHOUT SERIOUS COMORBIDITY WITH BODY MASS INDEX (BMI) OF 37.0 TO 37.9 IN ADULT: ICD-10-CM

## 2024-02-22 NOTE — PROGRESS NOTES
REMI Chowdhury  [facility]   Family Medicine  [address]  [place] [address]  Phone: [phone]  Fax: [phone]         Chief Complaint:  Chief Complaint   Patient presents with    Annual Exam        History:  Kristen Christie is a 39 y.o. female that is an established patient. She  is here for evaluation of the above complaint and management of chronic conditions.    HPI     She is here for annual exam. She continues with headaches described in previous visit notes and has an appt with neurology in 2 weeks. She is concerned about weight gain, has been off and on phentermine for the last 10 years with little help. She declines nutritional consult right now, insurance won't cover an GLP-1. We discussed increasing protein, decreasing simple carbohydrates. She admits sugary foods are difficult to avoid. She is exercising 3-4 times/week.             ROS:  Review of Systems   Constitutional:  Positive for unexpected weight change.   Respiratory: Negative.     Cardiovascular: Negative.    Gastrointestinal: Negative.    Neurological:  Positive for headaches.         reports that she quit smoking about 12 years ago. Her smoking use included cigarettes. She has a 10.00 pack-year smoking history. She has never used smokeless tobacco. She reports that she does not currently use alcohol. She reports that she does not use drugs.    Current Outpatient Medications   Medication Instructions    busPIRone (BUSPAR) 5-10 mg, Oral, Daily    citalopram (CELEXA) 20 mg, Oral, Daily    fluticasone (FLONASE) 50 MCG/ACT nasal spray 2 sprays, Nasal, Daily    loratadine (CLARITIN) 10 mg, Oral, Daily    mupirocin (BACTROBAN) 2 % nasal ointment Apply topically to the appropriate area as directed Daily for 7 days.  Apply inside nose daily for 7 days repeat each month for 3 months.    nystatin (MYCOSTATIN) 851350 UNIT/GM powder Topical, 4 Times Daily       OBJECTIVE:  /68 (BP Location: Right arm, Patient  "Position: Sitting, Cuff Size: Adult)   Pulse 78   Temp 98.4 °F (36.9 °C) (Oral)   Ht 154.9 cm (61\")   Wt 90.7 kg (200 lb)   SpO2 97%   BMI 37.79 kg/m²    Physical Exam  Vitals and nursing note reviewed.   Constitutional:       Appearance: Normal appearance.   HENT:      Head: Normocephalic and atraumatic.      Right Ear: Tympanic membrane, ear canal and external ear normal.      Left Ear: Tympanic membrane, ear canal and external ear normal.      Nose: Nose normal.      Mouth/Throat:      Mouth: Mucous membranes are moist.      Pharynx: Oropharynx is clear.   Eyes:      Extraocular Movements: Extraocular movements intact.      Conjunctiva/sclera: Conjunctivae normal.      Pupils: Pupils are equal, round, and reactive to light.   Cardiovascular:      Rate and Rhythm: Normal rate and regular rhythm.      Pulses: Normal pulses.      Heart sounds: Normal heart sounds.   Pulmonary:      Effort: Pulmonary effort is normal.      Breath sounds: Normal breath sounds.   Abdominal:      General: Bowel sounds are normal.      Palpations: Abdomen is soft.   Musculoskeletal:         General: Normal range of motion.      Cervical back: Normal range of motion and neck supple.   Skin:     General: Skin is warm and dry.      Capillary Refill: Capillary refill takes 2 to 3 seconds.   Neurological:      General: No focal deficit present.      Mental Status: She is alert and oriented to person, place, and time.   Psychiatric:         Mood and Affect: Mood normal.         Behavior: Behavior normal.         Thought Content: Thought content normal.         Procedures    Assessment/Plan:     Diagnoses and all orders for this visit:    1. Annual physical exam (Primary)  -Immunizations:      - Tetanus: up to date      - Influenza: up to date      - Prevnar: Once after age 65      - Shingrix: Series after 50      - COVID: up to date  CRC screening: Due at 45  Mammogram: Due at 50  PAP: was done on approximately 2 yrs ago and the result " was: normal PAP with negative HPV. Repeat 5 years.   DEXA: DEXA scan at 63     2. Chronic nonintractable headache, unspecified headache type    3. Anxiety and depression    4. Class 2 obesity due to excess calories without serious comorbidity with body mass index (BMI) of 37.0 to 37.9 in adult      Class 2 Severe Obesity (BMI >=35 and <=39.9). Obesity-related health conditions include the following: none. Obesity is unchanged. BMI is is above average; BMI management plan is completed. We discussed portion control and increasing exercise.       An After Visit Summary was printed and given to the patient at discharge.  Return in about 6 months (around 8/22/2024).       There are no Patient Instructions on file for this visit.      Discussion:     I spent 32 minutes caring for Kristen on this date of service. This time includes time spent by me in the following activities: preparing for the visit, reviewing tests, obtaining and/or reviewing a separately obtained history, performing a medically appropriate examination and/or evaluation, counseling and educating the patient/family/caregiver, ordering medications, tests, or procedures, documenting information in the medical record, and independently interpreting results and communicating that information with the patient/family/caregiver     Angie KHALIL 2/22/2024   Electronically signed.

## 2024-03-26 ENCOUNTER — PATIENT ROUNDING (BHMG ONLY) (OUTPATIENT)
Dept: NEUROLOGY | Facility: CLINIC | Age: 39
End: 2024-03-26
Payer: COMMERCIAL

## 2024-03-26 ENCOUNTER — OFFICE VISIT (OUTPATIENT)
Dept: NEUROLOGY | Facility: CLINIC | Age: 39
End: 2024-03-26
Payer: COMMERCIAL

## 2024-03-26 VITALS
WEIGHT: 200 LBS | SYSTOLIC BLOOD PRESSURE: 126 MMHG | HEART RATE: 76 BPM | DIASTOLIC BLOOD PRESSURE: 78 MMHG | HEIGHT: 61 IN | OXYGEN SATURATION: 98 % | BODY MASS INDEX: 37.76 KG/M2

## 2024-03-26 DIAGNOSIS — G44.52 NEW DAILY PERSISTENT HEADACHE: Primary | ICD-10-CM

## 2024-03-26 DIAGNOSIS — R56.9 SEIZURE-LIKE ACTIVITY: ICD-10-CM

## 2024-03-26 DIAGNOSIS — G43.719 INTRACTABLE CHRONIC MIGRAINE WITHOUT AURA AND WITHOUT STATUS MIGRAINOSUS: ICD-10-CM

## 2024-03-26 DIAGNOSIS — G43.909 EPISODIC MIGRAINE: ICD-10-CM

## 2024-03-26 PROCEDURE — 1160F RVW MEDS BY RX/DR IN RCRD: CPT | Performed by: PHYSICIAN ASSISTANT

## 2024-03-26 PROCEDURE — 1159F MED LIST DOCD IN RCRD: CPT | Performed by: PHYSICIAN ASSISTANT

## 2024-03-26 PROCEDURE — 99204 OFFICE O/P NEW MOD 45 MIN: CPT | Performed by: PHYSICIAN ASSISTANT

## 2024-03-26 RX ORDER — ATOGEPANT 60 MG/1
60 TABLET ORAL DAILY
Qty: 28 TABLET | Refills: 0 | COMMUNITY
Start: 2024-03-26

## 2024-03-26 RX ORDER — ATOGEPANT 60 MG/1
60 TABLET ORAL DAILY
Qty: 30 TABLET | Refills: 11 | Status: SHIPPED | OUTPATIENT
Start: 2024-03-26

## 2024-03-26 NOTE — PROGRESS NOTES
Subjective   Kristen Christie is a 39 y.o. female is being seen for consultation today at the request of REMI Chowdhury    History of Present Illness  Significant, daily activity limiting headache for the past year.  Positional issues when she is bending over or stooping with pain behind the right eye.  Eye exam has been negative reportedly.  She does have visual changes which are likely associated with migraine events.       The following portions of the patient's history were reviewed and updated as appropriate: allergies, current medications, past family history, past medical history, past social history, past surgical history and problem list.    Review of Systems   Constitutional:  Positive for activity change.   HENT:  Positive for congestion.    Eyes:  Positive for visual disturbance.   Respiratory: Negative.     Cardiovascular: Negative.    Allergic/Immunologic: Positive for environmental allergies.   Neurological:  Positive for dizziness and headache.   Psychiatric/Behavioral: Negative.           Current Outpatient Medications:     busPIRone (BUSPAR) 10 MG tablet, TAKE 0.5-1 TABLETS BY MOUTH DAILY., Disp: 30 tablet, Rfl: 3    citalopram (CeleXA) 20 MG tablet, Take 1 tablet by mouth Daily., Disp: 90 tablet, Rfl: 1    fluticasone (FLONASE) 50 MCG/ACT nasal spray, 2 sprays into the nostril(s) as directed by provider Daily., Disp: 16 g, Rfl: 3    loratadine (CLARITIN) 10 MG tablet, Take 1 tablet by mouth Daily., Disp: 30 tablet, Rfl: 5    Atogepant (Qulipta) 60 MG tablet, Take 1 tablet by mouth Daily., Disp: 28 tablet, Rfl: 0    Atogepant (Qulipta) 60 MG tablet, Take 1 tablet by mouth Daily., Disp: 30 tablet, Rfl: 11     Objective   Physical Exam  Vitals and nursing note reviewed.   Eyes:      General: Vision grossly intact. Gaze aligned appropriately.   Cardiovascular:      Rate and Rhythm: Normal rate.   Pulmonary:      Effort: Pulmonary effort is normal.   Neurological:      Mental Status: She is alert and  oriented to person, place, and time.      GCS: GCS eye subscore is 4. GCS verbal subscore is 5. GCS motor subscore is 6.      Cranial Nerves: Cranial nerves 2-12 are intact.      Sensory: Sensation is intact.      Motor: Motor function is intact.      Coordination: Coordination is intact.      Gait: Gait is intact.      Deep Tendon Reflexes: Reflexes are normal and symmetric.   Psychiatric:         Attention and Perception: Attention normal.         Mood and Affect: Mood normal.         Speech: Speech normal.         Behavior: Behavior normal.         Cognition and Memory: Cognition normal.           Assessment & Plan   Diagnoses and all orders for this visit:    1. New daily persistent headache (Primary)  -     MRI Brain With & Without Contrast; Future  -     CT venogram head w contrast; Future  -     EEG; Future    2. Seizure-like activity  -     EEG; Future    3. Intractable chronic migraine without aura and without status migrainosus  -     Atogepant (Qulipta) 60 MG tablet; Take 1 tablet by mouth Daily.  Dispense: 28 tablet; Refill: 0  -     Atogepant (Qulipta) 60 MG tablet; Take 1 tablet by mouth Daily.  Dispense: 30 tablet; Refill: 11    4. Episodic migraine      Recommend CT venogram of the head.  She does not have papilledema but certainly some risk factors here for pseudotumor symptoms.    Given refractory nature of her headache and no previous advanced imaging, I would recommend an MRI of her brain as well.    For frequent unpredictable chronic type migraine I would recommend a trial of atogepant 60 mg daily.    Return follow-up subsequent to testing.               Dictated utilizing Dragon dictation.

## 2024-03-26 NOTE — PROGRESS NOTES
March 26, 2024    Hello, may I speak with Kristen Christie?    My name is Alexis      I am  with [facility]  [address]  [address]  [facility] [address] [phone].    Before we get started may I verify your date of birth? 1985    I am calling to officially welcome you to our practice and ask about your recent visit. Is this a good time to talk? yes    Tell me about your visit with us. What things went well?  everything went great       We're always looking for ways to make our patients' experiences even better. Do you have recommendations on ways we may improve?  no    Overall were you satisfied with your first visit to our practice? yes       I appreciate you taking the time to speak with me today. Is there anything else I can do for you? no      Thank you, and have a great day.

## 2024-04-03 ENCOUNTER — TELEPHONE (OUTPATIENT)
Dept: NEUROLOGY | Facility: CLINIC | Age: 39
End: 2024-04-03
Payer: COMMERCIAL

## 2024-04-03 NOTE — TELEPHONE ENCOUNTER
PER LEORA DRAPER- PLEASE COMPLETE THE AUTHORIZED CTV. WE WILL RESUBMIT FOR AN AUTHORIZATION FOR MRI WITH THE RESULTS OF THE CTV, IF NEEDED.

## 2024-04-03 NOTE — TELEPHONE ENCOUNTER
Caller: DUNIA     Relationship: [employer] [facility]     Best call back number: [phone]    FAX # [phone]      What was the call regarding: REF # [id number]     FOR PEER TO PEER OR SEND INFORMATION NEEDED TO APPROVE MRI BRAIN     1)NEED DR NOTES WITH PT PROBLEM /HOW LONG HAVE HAD SYMPTOMS / IS IT GETTING WORST OR CHANGING.      2) WHAT THE DR  EXAM SHOWS     3)  HAS PT HAD ANY OTHER TESTING     SHE WILL FAX P.W TO FILL OUT TO OFFICE FAX OR  OR YOU CAN FAX ANSWERS TO QUESTIONS. ABOVE   FAX # [phone]    Is it okay if the provider responds through MyChart: CALL IF QUESTIONS

## 2024-04-04 ENCOUNTER — TELEPHONE (OUTPATIENT)
Dept: NEUROLOGY | Facility: HOSPITAL | Age: 39
End: 2024-04-04

## 2024-04-10 ENCOUNTER — TELEPHONE (OUTPATIENT)
Dept: NEUROLOGY | Facility: HOSPITAL | Age: 39
End: 2024-04-10

## 2024-04-11 ENCOUNTER — HOSPITAL ENCOUNTER (OUTPATIENT)
Dept: NEUROLOGY | Facility: HOSPITAL | Age: 39
Discharge: HOME | End: 2024-04-11
Payer: COMMERCIAL

## 2024-04-11 ENCOUNTER — HOSPITAL ENCOUNTER (OUTPATIENT)
Dept: CT IMAGING | Facility: HOSPITAL | Age: 39
Discharge: HOME | End: 2024-04-11
Payer: COMMERCIAL

## 2024-04-11 DIAGNOSIS — G44.52 NEW DAILY PERSISTENT HEADACHE: ICD-10-CM

## 2024-04-11 DIAGNOSIS — R56.9 SEIZURE-LIKE ACTIVITY: ICD-10-CM

## 2024-04-11 PROCEDURE — 95816 EEG AWAKE AND DROWSY: CPT

## 2024-04-11 PROCEDURE — 70496 CT ANGIOGRAPHY HEAD: CPT

## 2024-04-11 PROCEDURE — 25510000001 IOPAMIDOL PER 1 ML: Performed by: PHYSICIAN ASSISTANT

## 2024-04-11 RX ADMIN — IOPAMIDOL 100 ML: 755 INJECTION, SOLUTION INTRAVENOUS at 11:01

## 2024-04-15 ENCOUNTER — TELEPHONE (OUTPATIENT)
Dept: NEUROLOGY | Facility: CLINIC | Age: 39
End: 2024-04-15
Payer: COMMERCIAL

## 2024-04-15 NOTE — TELEPHONE ENCOUNTER
Caller: Kristen Christie    Relationship: Self    Best call back number: [phone]    What orders are you requesting (i.e. lab or imaging): IMAGINE    In what timeframe would the patient need to come in: ASAP    Where will you receive your lab/imaging services: ANYWHERE SUGGESTED    Additional notes: PATIENT WAS ADVISED ON THE INITIAL ORDER FOR THE MRI THAT [facility] WAS DENYING IT. THEY TOLD HER TO HAVE THE EEG AND CT DONE AND THEN REORDER THE MRI TO GET APPROVAL. BOTH THE EEG AND CT ARE COMPLETED.     PLEASE ADVISE  THANK YOU

## 2024-04-15 NOTE — TELEPHONE ENCOUNTER
Caller: PratikThaee    Relationship: Self    Best call back number: [phone] (home)     Caller requesting test results: SELF    What test was performed: CT VENOGRAM HEAD AND EEG    When was the test performed: 04/11/24 FOR BOTH    Where was the test performed:  [facility]    Additional notes: PATIENT IS REQUESTING THE RESULTS OF THE TWO TESTS PLEASE.

## 2024-04-16 DIAGNOSIS — R56.9 SEIZURE-LIKE ACTIVITY: ICD-10-CM

## 2024-04-16 DIAGNOSIS — G43.909 EPISODIC MIGRAINE: ICD-10-CM

## 2024-04-16 DIAGNOSIS — G44.52 NEW DAILY PERSISTENT HEADACHE: Primary | ICD-10-CM

## 2024-04-16 DIAGNOSIS — G43.719 INTRACTABLE CHRONIC MIGRAINE WITHOUT AURA AND WITHOUT STATUS MIGRAINOSUS: ICD-10-CM

## 2024-04-17 NOTE — TELEPHONE ENCOUNTER
Spoke to pt and made aware that testing was normal- MRI has been reordered. Scheduling dept will contact for additional testing. Further detailed will be discussed at f/u.     Pt voiced understanding.

## 2024-04-17 NOTE — TELEPHONE ENCOUNTER
Spoke with Ms. Christie to let her know that Sid has placed a new order for an MRI without contrast & hospital scheduling should be contacting her for scheduling. She voiced understanding.

## 2024-04-19 ENCOUNTER — TELEPHONE (OUTPATIENT)
Dept: NEUROLOGY | Facility: CLINIC | Age: 39
End: 2024-04-19
Payer: COMMERCIAL

## 2024-04-19 NOTE — TELEPHONE ENCOUNTER
Provider: LEORA DRAPER    Caller: ARCHANA WITH  [employer]    Phone Number: [phone]    Reason for Call: STATES PATIENT'S MRI AUTH STILL HAS NOT COME THROUGH. NEED PERMISSION TO RESCHEDULE IS AUTH HAS NOT COME THROUGH BY 3PM AS MRI IS SCHEDULE FOR MONDAY. PLEASE ADVISE, THANK YOU.

## 2024-04-25 ENCOUNTER — HOSPITAL ENCOUNTER (OUTPATIENT)
Dept: MRI IMAGING | Facility: HOSPITAL | Age: 39
Discharge: HOME | End: 2024-04-25
Admitting: PHYSICIAN ASSISTANT
Payer: COMMERCIAL

## 2024-04-25 DIAGNOSIS — G43.909 EPISODIC MIGRAINE: ICD-10-CM

## 2024-04-25 DIAGNOSIS — R56.9 SEIZURE-LIKE ACTIVITY: ICD-10-CM

## 2024-04-25 DIAGNOSIS — G43.719 INTRACTABLE CHRONIC MIGRAINE WITHOUT AURA AND WITHOUT STATUS MIGRAINOSUS: ICD-10-CM

## 2024-04-25 DIAGNOSIS — G44.52 NEW DAILY PERSISTENT HEADACHE: ICD-10-CM

## 2024-04-25 PROCEDURE — 70551 MRI BRAIN STEM W/O DYE: CPT

## 2024-04-26 ENCOUNTER — TELEPHONE (OUTPATIENT)
Dept: NEUROLOGY | Facility: CLINIC | Age: 39
End: 2024-04-26
Payer: COMMERCIAL

## 2024-04-26 ENCOUNTER — TELEPHONE (OUTPATIENT)
Dept: INTERNAL MEDICINE | Facility: CLINIC | Age: 39
End: 2024-04-26
Payer: COMMERCIAL

## 2024-04-26 RX ORDER — NYSTATIN 100000 [USP'U]/G
POWDER TOPICAL 4 TIMES DAILY
Qty: 30 G | Refills: 3 | Status: SHIPPED | OUTPATIENT
Start: 2024-04-26

## 2024-04-26 NOTE — TELEPHONE ENCOUNTER
Caller: Kristen Christie    Relationship: Self    Best call back number: [phone]     What medication are you requesting: NYSTOP TOPICAL POWDER    What are your current symptoms: YEAST INFECTION    How long have you been experiencing symptoms: 6MONTHS    Have you had these symptoms before:    [x] Yes  [] No    Have you been treated for these symptoms before:   [x] Yes  [] No    If a prescription is needed, what is your preferred pharmacy and phone number: [facility] - [place], [place] - [address] [place] [address] - [phone] [facility] [phone]      Additional notes: PATIENT STATES SHE HAS BEEN GIVEN THIS MEDICATION BEFORE BUT I DON'T SHOW ON MEDICATIONS LIST

## 2024-04-26 NOTE — TELEPHONE ENCOUNTER
Notified of Angel's message, explained that Sid is out of the office until Monday, I will send him a message.

## 2024-04-26 NOTE — TELEPHONE ENCOUNTER
Caller: Kristen Christie    Relationship: Self    Best call back number: [phone]    Caller requesting test results: PATIENT    What test was performed: MRI    When was the test performed: YESTERDAY    Where was the test performed:  [facility]    Additional notes: PATIENT CAN SEE RESULTS IN MYCHART BUT WOULD LIKE SOMEONE TO GO OVER THAT WITH HER AND CLARIFY.       0

## 2024-04-29 NOTE — TELEPHONE ENCOUNTER
Caller: Pratik Kristen    Relationship: Self    Best call back number: [phone]    What was the call regarding: PT CALLING TO CHECK FOR UPDATE FROM NATHALY VALENTINE.    Do you require a callback: YES, PLEASE.    Is it okay if the provider responds through [facility]?: YES    PLEASE REVIEW AND ADVISE.

## 2024-04-30 ENCOUNTER — PREP FOR SURGERY (OUTPATIENT)
Dept: OTHER | Facility: HOSPITAL | Age: 39
End: 2024-04-30
Payer: COMMERCIAL

## 2024-04-30 DIAGNOSIS — G44.52 NEW DAILY PERSISTENT HEADACHE: Primary | ICD-10-CM

## 2024-05-13 ENCOUNTER — HOSPITAL ENCOUNTER (OUTPATIENT)
Dept: GENERAL RADIOLOGY | Facility: HOSPITAL | Age: 39
Discharge: HOME | End: 2024-05-13
Admitting: PHYSICIAN ASSISTANT
Payer: COMMERCIAL

## 2024-05-13 VITALS
BODY MASS INDEX: 36.8 KG/M2 | RESPIRATION RATE: 16 BRPM | DIASTOLIC BLOOD PRESSURE: 75 MMHG | TEMPERATURE: 96.5 F | HEIGHT: 63 IN | SYSTOLIC BLOOD PRESSURE: 103 MMHG | HEART RATE: 60 BPM | OXYGEN SATURATION: 100 % | WEIGHT: 207.67 LBS

## 2024-05-13 DIAGNOSIS — G44.52 NEW DAILY PERSISTENT HEADACHE: ICD-10-CM

## 2024-05-13 LAB
APPEARANCE CSF: CLEAR
APPEARANCE CSF: CLEAR
APTT PPP: 27.2 SECONDS (ref 24.5–36)
B-HCG UR QL: NEGATIVE
BASOPHILS # BLD AUTO: 0.05 10*3/MM3 (ref 0–0.2)
BASOPHILS NFR BLD AUTO: 0.8 % (ref 0–1.5)
COLOR CSF: COLORLESS
COLOR CSF: COLORLESS
COLOR SPUN CSF: COLORLESS
COLOR SPUN CSF: COLORLESS
DEPRECATED RDW RBC AUTO: 42.2 FL (ref 37–54)
EOSINOPHIL # BLD AUTO: 0.13 10*3/MM3 (ref 0–0.4)
EOSINOPHIL NFR BLD AUTO: 2 % (ref 0.3–6.2)
ERYTHROCYTE [DISTWIDTH] IN BLOOD BY AUTOMATED COUNT: 12.7 % (ref 12.3–15.4)
GLUCOSE CSF-MCNC: 60 MG/DL (ref 40–70)
HCT VFR BLD AUTO: 43.6 % (ref 34–46.6)
HGB BLD-MCNC: 14.2 G/DL (ref 12–15.9)
IMM GRANULOCYTES # BLD AUTO: 0.02 10*3/MM3 (ref 0–0.05)
IMM GRANULOCYTES NFR BLD AUTO: 0.3 % (ref 0–0.5)
INR PPP: 0.83 (ref 0.91–1.09)
LYMPHOCYTES # BLD AUTO: 1.97 10*3/MM3 (ref 0.7–3.1)
LYMPHOCYTES NFR BLD AUTO: 30.6 % (ref 19.6–45.3)
MCH RBC QN AUTO: 29.3 PG (ref 26.6–33)
MCHC RBC AUTO-ENTMCNC: 32.6 G/DL (ref 31.5–35.7)
MCV RBC AUTO: 90.1 FL (ref 79–97)
METHOD: ABNORMAL
METHOD: NORMAL
MONOCYTES # BLD AUTO: 0.5 10*3/MM3 (ref 0.1–0.9)
MONOCYTES NFR BLD AUTO: 7.8 % (ref 5–12)
NEUTROPHILS NFR BLD AUTO: 3.77 10*3/MM3 (ref 1.7–7)
NEUTROPHILS NFR BLD AUTO: 58.5 % (ref 42.7–76)
NRBC BLD AUTO-RTO: 0 /100 WBC (ref 0–0.2)
NUC CELL # CSF MANUAL: 0 /MM3 (ref 0–8)
NUC CELL # CSF MANUAL: 1 /MM3 (ref 0–8)
PLATELET # BLD AUTO: 283 10*3/MM3 (ref 140–450)
PMV BLD AUTO: 10.1 FL (ref 6–12)
PROT CSF-MCNC: 24.9 MG/DL (ref 15–45)
PROTHROMBIN TIME: 11.7 SECONDS (ref 11.8–14.8)
RBC # BLD AUTO: 4.84 10*6/MM3 (ref 3.77–5.28)
RBC # CSF MANUAL: 0 /MM3 (ref 0–0)
RBC # CSF MANUAL: 47 /MM3 (ref 0–0)
SPECIMEN VOL CSF: 24 ML
SPECIMEN VOL CSF: 24 ML
TUBE # CSF: 1
TUBE # CSF: 4
WBC NRBC COR # BLD AUTO: 6.44 10*3/MM3 (ref 3.4–10.8)

## 2024-05-13 PROCEDURE — 83916 OLIGOCLONAL BANDS: CPT | Performed by: PHYSICIAN ASSISTANT

## 2024-05-13 PROCEDURE — 82042 OTHER SOURCE ALBUMIN QUAN EA: CPT | Performed by: PHYSICIAN ASSISTANT

## 2024-05-13 PROCEDURE — 87205 SMEAR GRAM STAIN: CPT | Performed by: PHYSICIAN ASSISTANT

## 2024-05-13 PROCEDURE — 85610 PROTHROMBIN TIME: CPT | Performed by: STUDENT IN AN ORGANIZED HEALTH CARE EDUCATION/TRAINING PROGRAM

## 2024-05-13 PROCEDURE — 86592 SYPHILIS TEST NON-TREP QUAL: CPT | Performed by: PHYSICIAN ASSISTANT

## 2024-05-13 PROCEDURE — 85025 COMPLETE CBC W/AUTO DIFF WBC: CPT | Performed by: STUDENT IN AN ORGANIZED HEALTH CARE EDUCATION/TRAINING PROGRAM

## 2024-05-13 PROCEDURE — 82945 GLUCOSE OTHER FLUID: CPT | Performed by: PHYSICIAN ASSISTANT

## 2024-05-13 PROCEDURE — 85730 THROMBOPLASTIN TIME PARTIAL: CPT | Performed by: STUDENT IN AN ORGANIZED HEALTH CARE EDUCATION/TRAINING PROGRAM

## 2024-05-13 PROCEDURE — 84157 ASSAY OF PROTEIN OTHER: CPT | Performed by: PHYSICIAN ASSISTANT

## 2024-05-13 PROCEDURE — 82784 ASSAY IGA/IGD/IGG/IGM EACH: CPT | Performed by: PHYSICIAN ASSISTANT

## 2024-05-13 PROCEDURE — 87070 CULTURE OTHR SPECIMN AEROBIC: CPT | Performed by: PHYSICIAN ASSISTANT

## 2024-05-13 PROCEDURE — 82040 ASSAY OF SERUM ALBUMIN: CPT | Performed by: PHYSICIAN ASSISTANT

## 2024-05-13 PROCEDURE — 81025 URINE PREGNANCY TEST: CPT | Performed by: STUDENT IN AN ORGANIZED HEALTH CARE EDUCATION/TRAINING PROGRAM

## 2024-05-13 PROCEDURE — 89050 BODY FLUID CELL COUNT: CPT | Performed by: PHYSICIAN ASSISTANT

## 2024-05-13 PROCEDURE — 83873 ASSAY OF CSF PROTEIN: CPT | Performed by: PHYSICIAN ASSISTANT

## 2024-05-13 PROCEDURE — 87015 SPECIMEN INFECT AGNT CONCNTJ: CPT | Performed by: PHYSICIAN ASSISTANT

## 2024-05-14 ENCOUNTER — TELEPHONE (OUTPATIENT)
Dept: NEUROLOGY | Facility: CLINIC | Age: 39
End: 2024-05-14
Payer: COMMERCIAL

## 2024-05-14 NOTE — TELEPHONE ENCOUNTER
Mango Sampson PA Carnes, Colleen, LPN  Tylenol, ibuprofen, both          Previous Messages       ----- Message -----  From: Kimberly Camarillo LPN  Sent: 5/14/2024   1:55 PM CDT  To: NATHALY Hanks    Kristen said she had the LP done yesterday, her lower back hurts today.  She wants to know what OTC med she should take for the pain.  She has been in a recliner or the bed and thinks that is why her back hurts.  She would like to know when she can get up and move around.

## 2024-05-14 NOTE — TELEPHONE ENCOUNTER
Caller: PratikThaee    Relationship: Self    Best call back number: [phone]    What was the call regarding: PT HAD LP COMPLETED YESTERDAY. SHE STATES HER HEAD IS FEELING MUCH BETTER TODAY, NO SPINAL HEADACHE, HOWEVER, HER BACK IS PRETTY SORE. PT STATES HER DISCHARGE PPW STATES THAT SHE CAN TAKE OTC MEDICATIONS FOR THE BACK PAIN.    PT CALLING TO ASK WHAT OTC MEDICATIONS NATHALY VALENTINE RECOMMENDS FOR BACK PAIN FOLLOWING LP. PT KNOWS THAT IT IS USUALLY RECOMMENDED THAT PATIENTS LAY FLAT FOLLOWING LUMBAR PUNCTURES TO PREVENT SPINAL HEADACHE, HOWEVER, PT ASKS IF LACHELLE WOULD RECOMMEND SHE GET UP AND MOVE AROUND A LITTLE BIT TO PREVENT HER BACK FROM GETTING STIFF.    SHE ALSO NOTES THAT SOME OF LAB RESULTS FROM HER LP HAVE COME BACK WITH RESULTS.    Do you require a callback: YES, PLEASE.    PLEASE REVIEW AND ADVISE.

## 2024-05-15 ENCOUNTER — OFFICE VISIT (OUTPATIENT)
Dept: OBSTETRICS AND GYNECOLOGY | Age: 39
End: 2024-05-15
Payer: COMMERCIAL

## 2024-05-15 VITALS
HEIGHT: 61 IN | WEIGHT: 204 LBS | RESPIRATION RATE: 18 BRPM | BODY MASS INDEX: 38.51 KG/M2 | SYSTOLIC BLOOD PRESSURE: 110 MMHG | DIASTOLIC BLOOD PRESSURE: 70 MMHG

## 2024-05-15 DIAGNOSIS — Z12.31 ENCOUNTER FOR SCREENING MAMMOGRAM FOR MALIGNANT NEOPLASM OF BREAST: ICD-10-CM

## 2024-05-15 DIAGNOSIS — Z01.419 WELL WOMAN EXAM WITH ROUTINE GYNECOLOGICAL EXAM: Primary | ICD-10-CM

## 2024-05-15 DIAGNOSIS — Z12.4 CERVICAL CANCER SCREENING: ICD-10-CM

## 2024-05-15 DIAGNOSIS — B37.2 YEAST DERMATITIS: ICD-10-CM

## 2024-05-15 DIAGNOSIS — J30.2 SEASONAL ALLERGIES: ICD-10-CM

## 2024-05-15 LAB
ALB CSF/SERPL: 3 {RATIO} (ref 0–8)
ALBUMIN CSF-MCNC: 14 MG/DL (ref 8–37)
ALBUMIN SERPL-MCNC: 4.4 G/DL (ref 3.9–4.9)
IGG CSF-MCNC: 1.7 MG/DL (ref 0–6.7)
IGG SERPL-MCNC: 1024 MG/DL (ref 586–1602)
IGG SYNTH RATE SER+CSF CALC-MRATE: -2.8 MG/DAY
IGG/ALB CLEAR SER+CSF-RTO: 0.5 (ref 0–0.7)
IGG/ALB CSF: 0.12 {RATIO} (ref 0–0.25)
MBP CSF-MCNC: 2.8 NG/ML (ref 0–3.7)
OLIGOCLONAL BANDS.IT SER+CSF QL: NORMAL
REAGIN AB CSF QL: NON REACTIVE

## 2024-05-15 PROCEDURE — G0123 SCREEN CERV/VAG THIN LAYER: HCPCS | Performed by: NURSE PRACTITIONER

## 2024-05-15 PROCEDURE — 87624 HPV HI-RISK TYP POOLED RSLT: CPT | Performed by: NURSE PRACTITIONER

## 2024-05-15 RX ORDER — ALBUTEROL SULFATE 90 UG/1
2 AEROSOL, METERED RESPIRATORY (INHALATION) EVERY 4 HOURS PRN
Qty: 8 G | Refills: 1 | Status: SHIPPED | OUTPATIENT
Start: 2024-05-15

## 2024-05-15 RX ORDER — NYSTATIN 100000 U/G
1 CREAM TOPICAL 3 TIMES DAILY
Qty: 30 G | Refills: 3 | Status: SHIPPED | OUTPATIENT
Start: 2024-05-15

## 2024-05-15 NOTE — PROGRESS NOTES
Subjective   Kristen Christie is a 39 y.o. female  YOB: 1985        Chief Complaint   Patient presents with    Gynecologic Exam     Patient is here for annual well GYN Exam. Last well GYN exam and pap 10/19/22,WNL. Last Mammo 11/3/22.        Gynecologic Exam  The patient's pertinent negatives include no pelvic pain. Pertinent negatives include no abdominal pain, back pain, constipation, diarrhea, dysuria, fever, frequency, hematuria, nausea, rash, sore throat, urgency or vomiting.       The following portions of the patient's history were reviewed and updated as appropriate: allergies, current medications, past family history, past medical history, past social history, past surgical history, and problem list.    No Known Allergies    Past Medical History:   Diagnosis Date    Abnormal Pap smear of cervix     Anxiety     Asthma     childhood    Cervical dysplasia     COVID-19 virus infection 2021    again in     Depression     Polycystic ovary syndrome     Seasonal allergies        Family History   Problem Relation Age of Onset    Diabetes Father     Hypertension Father     Diabetes Paternal Grandmother     Hypertension Paternal Grandmother     Stroke Paternal Grandmother     Stroke Mother     Thyroid disease Mother     Hypertension Sister     Stroke Maternal Grandmother     Breast cancer Maternal Grandmother 60    Stroke Maternal Grandfather     Hypertension Brother     Ovarian cancer Maternal Great-Grandmother         age unknown    Uterine cancer Neg Hx     Colon cancer Neg Hx     Melanoma Neg Hx     Prostate cancer Neg Hx        Social History     Socioeconomic History    Marital status:    Tobacco Use    Smoking status: Former     Current packs/day: 0.00     Average packs/day: 1 pack/day for 10.0 years (10.0 ttl pk-yrs)     Types: Cigarettes     Start date: 2002     Quit date: 2012     Years since quittin.3    Smokeless tobacco: Never   Vaping Use    Vaping status: Never  Used   Substance and Sexual Activity    Alcohol use: Not Currently     Comment: occ    Drug use: No    Sexual activity: Not Currently     Partners: Male     Birth control/protection: Abstinence         Current Outpatient Medications:     busPIRone (BUSPAR) 10 MG tablet, TAKE 0.5-1 TABLETS BY MOUTH DAILY. (Patient taking differently: Take 0.5-1 tablets by mouth Daily As Needed.), Disp: 30 tablet, Rfl: 3    citalopram (CeleXA) 20 MG tablet, Take 1 tablet by mouth Daily., Disp: 90 tablet, Rfl: 1    fluticasone (FLONASE) 50 MCG/ACT nasal spray, 2 sprays into the nostril(s) as directed by provider Daily., Disp: 16 g, Rfl: 3    loratadine (CLARITIN) 10 MG tablet, Take 1 tablet by mouth Daily., Disp: 30 tablet, Rfl: 5    nystatin (MYCOSTATIN) 035568 UNIT/GM powder, Apply  topically to the appropriate area as directed 4 (Four) Times a Day., Disp: 30 g, Rfl: 3    albuterol sulfate  (90 Base) MCG/ACT inhaler, Inhale 2 puffs Every 4 (Four) Hours As Needed for Wheezing., Disp: 8 g, Rfl: 1    nystatin (MYCOSTATIN) 800190 UNIT/GM cream, Apply 1 Application topically to the appropriate area as directed 3 (Three) Times a Day., Disp: 30 g, Rfl: 3    Patient's last menstrual period was 04/23/2024 (approximate).    Sexual History:           Could not be calculated    Past Surgical History:   Procedure Laterality Date    CERVICAL BIOPSY  W/ LOOP ELECTRODE EXCISION      COLPOSCOPY      LEEP  2015    LUMBAR PUNCTURE      for migraine    TONSILLECTOMY  2004    WISDOM TOOTH EXTRACTION  2004       Review of Systems   Constitutional:  Negative for activity change, appetite change, fatigue, fever, unexpected weight gain and unexpected weight loss.   HENT:  Negative for congestion, ear pain, hearing loss, nosebleeds, rhinorrhea, sore throat, tinnitus and trouble swallowing.    Eyes:  Negative for blurred vision, pain, discharge, itching and visual disturbance.   Respiratory:  Negative for apnea, chest tightness, shortness of breath and  wheezing.    Cardiovascular:  Negative for chest pain and leg swelling.   Gastrointestinal:  Negative for abdominal pain, blood in stool, constipation, diarrhea, nausea, vomiting and GERD.   Endocrine: Negative for heat intolerance, polydipsia and polyuria.   Genitourinary: Negative.  Negative for breast lump, decreased libido, difficulty urinating, dyspareunia, dysuria, frequency, genital sores, hematuria, menstrual problem, pelvic pain, urgency, urinary incontinence and vaginal pain.   Musculoskeletal:  Negative for arthralgias, back pain, joint swelling and myalgias.   Skin:  Negative for color change, rash and skin lesions.   Allergic/Immunologic: Negative for environmental allergies, food allergies and immunocompromised state.   Neurological:  Negative for dizziness, tremors, seizures, syncope, facial asymmetry, numbness and headache.   Hematological:  Negative for adenopathy. Does not bruise/bleed easily.   Psychiatric/Behavioral:  Negative for agitation, hallucinations, sleep disturbance, suicidal ideas and depressed mood. The patient is not nervous/anxious.        Objective   Physical Exam  Vitals reviewed.   Constitutional:       General: She is not in acute distress.     Appearance: She is well-developed. She is not ill-appearing.   HENT:      Head: Normocephalic.      Right Ear: External ear normal.      Left Ear: External ear normal.      Nose: Nose normal.      Mouth/Throat:      Pharynx: No oropharyngeal exudate.   Eyes:      General: No scleral icterus.        Right eye: No discharge.         Left eye: No discharge.      Conjunctiva/sclera: Conjunctivae normal.      Pupils: Pupils are equal, round, and reactive to light.   Neck:      Thyroid: No thyroid mass or thyromegaly.   Cardiovascular:      Rate and Rhythm: Normal rate and regular rhythm.      Heart sounds: Normal heart sounds. No murmur heard.  Pulmonary:      Effort: Pulmonary effort is normal. No respiratory distress.      Breath sounds:  "Normal breath sounds. No wheezing or rales.   Chest:      Chest wall: No tenderness.   Abdominal:      General: Bowel sounds are normal. There is no distension.      Palpations: Abdomen is soft. There is no mass.      Tenderness: There is no abdominal tenderness. There is no guarding or rebound.      Hernia: No hernia is present.   Genitourinary:     Exam position: Prone.      Labia:         Right: No rash, tenderness or lesion.         Left: No rash, tenderness, lesion or injury.       Vagina: Normal. No vaginal discharge or tenderness.      Cervix: No cervical motion tenderness, discharge or friability.      Uterus: Not enlarged and not tender.       Adnexa:         Right: No mass or tenderness.          Left: No mass or tenderness.        Comments: Urethra and urethral meatus normal.    Bladder - normal, no prolapse.  Perineum and rectum examined - intact and no lesions.    Musculoskeletal:         General: No tenderness or deformity. Normal range of motion.      Cervical back: Normal range of motion and neck supple.   Lymphadenopathy:      Cervical: No cervical adenopathy.   Skin:     General: Skin is warm and dry.      Coloration: Skin is not pale.      Findings: No erythema or rash.   Neurological:      Mental Status: She is alert and oriented to person, place, and time.      Motor: No abnormal muscle tone.      Coordination: Coordination normal.      Deep Tendon Reflexes: Reflexes are normal and symmetric.   Psychiatric:         Behavior: Behavior normal. Behavior is cooperative.         Thought Content: Thought content normal.         Judgment: Judgment normal.           Vitals:    05/15/24 0937   BP: 110/70   Resp: 18   Weight: 92.5 kg (204 lb)   Height: 154.9 cm (61\")       Diagnoses and all orders for this visit:    1. Well woman exam with routine gynecological exam (Primary)  Comments:  Normal well woman exam.  ThinPrep Pap smear done.  Mammogram ordered.  Orders:  -     Liquid-based Pap Smear, " Screening    2. Cervical cancer screening  -     Liquid-based Pap Smear, Screening    3. Encounter for screening mammogram for malignant neoplasm of breast  -     Mammo screening digital tomosynthesis bilateral w CAD    4. Yeast dermatitis  Comments:  Patient needs refill of nystatin cream for yeast dermatitis.  Refill sent to pharmacy.  Orders:  -     nystatin (MYCOSTATIN) 670497 UNIT/GM cream; Apply 1 Application topically to the appropriate area as directed 3 (Three) Times a Day.  Dispense: 30 g; Refill: 3    5. Seasonal allergies  Comments:  Patient request refill of albuterol inhaler.  Refill sent to pharmacy.  Orders:  -     albuterol sulfate  (90 Base) MCG/ACT inhaler; Inhale 2 puffs Every 4 (Four) Hours As Needed for Wheezing.  Dispense: 8 g; Refill: 1        Normal GYN exam. Will have lab work here. Encouraged SBE.  Pt is aware how to do self breast exam and the importance of same. Discussed weight management and importance of maintaining a healthy weight. Discussed Vitamin D intake and the importance of adequate vitamin D for both bone health and a healthy immune system.  Discussed daily exercise and the importance of same in regards to a healthy heart as well as helping to maintain her weight and improving her mental health.  Body mass index is 38.55 kg/m². Colonoscopy is not age appropriate.  Mammogram will be scheduled at [facility]. Pap smear is done per [facility] guidelines.                  Non-Smoker    MyChart Instructions Given

## 2024-05-16 DIAGNOSIS — G93.2 PSEUDOTUMOR CEREBRI: Primary | ICD-10-CM

## 2024-05-16 LAB
BACTERIA SPEC AEROBE CULT: NORMAL
GEN CATEG CVX/VAG CYTO-IMP: NORMAL
GRAM STN SPEC: NORMAL
HPV I/H RISK 4 DNA CVX QL PROBE+SIG AMP: NOT DETECTED
LAB AP CASE REPORT: NORMAL
LAB AP GYN ADDITIONAL INFORMATION: NORMAL
Lab: NORMAL
PATH INTERP SPEC-IMP: NORMAL
STAT OF ADQ CVX/VAG CYTO-IMP: NORMAL

## 2024-05-16 RX ORDER — ACETAZOLAMIDE 250 MG/1
250 TABLET ORAL 2 TIMES DAILY
Qty: 60 TABLET | Refills: 3 | Status: SHIPPED | OUTPATIENT
Start: 2024-05-16

## 2024-05-16 NOTE — PROGRESS NOTES
Discussed LP results and labs that have returned thus far.  Also discussed CTV and MRI results as well as the EEG.  The patient has some mild post LP symptoms these were not very significant at this point.  She does have pseudotumor cerebri and we will begin Diamox 250 mg twice per day with an eye towards increasing this over time to 1 g twice a day if possible.  Return in follow-up as scheduled.

## 2024-05-29 ENCOUNTER — TELEPHONE (OUTPATIENT)
Dept: NEUROLOGY | Facility: CLINIC | Age: 39
End: 2024-05-29
Payer: COMMERCIAL

## 2024-05-29 NOTE — TELEPHONE ENCOUNTER
Caller: Kristen Christie    Relationship: Self    Best call back number: [phone]*    What is the best time to reach you: ANYTIME    Who are you requesting to speak with (clinical staff, provider,  specific staff member): CLINICAL    What was the call regarding: PATIENT IS CURIOUS WITH HER PSEUDO TUMOR IF FLYING WILL EFFECT ANYTHING? LIKE THE ADDED PRESSURE FORM FLYING.     Is it okay if the provider responds through [facility]: YES

## 2024-06-11 ENCOUNTER — OFFICE VISIT (OUTPATIENT)
Dept: NEUROLOGY | Facility: CLINIC | Age: 39
End: 2024-06-11
Payer: COMMERCIAL

## 2024-06-11 VITALS
DIASTOLIC BLOOD PRESSURE: 84 MMHG | HEART RATE: 68 BPM | HEIGHT: 61 IN | OXYGEN SATURATION: 99 % | BODY MASS INDEX: 38.89 KG/M2 | RESPIRATION RATE: 18 BRPM | WEIGHT: 206 LBS | SYSTOLIC BLOOD PRESSURE: 128 MMHG

## 2024-06-11 DIAGNOSIS — G93.2 PSEUDOTUMOR CEREBRI: ICD-10-CM

## 2024-06-11 DIAGNOSIS — G43.909 EPISODIC MIGRAINE: Primary | ICD-10-CM

## 2024-06-11 DIAGNOSIS — G43.719 INTRACTABLE CHRONIC MIGRAINE WITHOUT AURA AND WITHOUT STATUS MIGRAINOSUS: ICD-10-CM

## 2024-06-11 PROCEDURE — 1160F RVW MEDS BY RX/DR IN RCRD: CPT | Performed by: PHYSICIAN ASSISTANT

## 2024-06-11 PROCEDURE — 1159F MED LIST DOCD IN RCRD: CPT | Performed by: PHYSICIAN ASSISTANT

## 2024-06-11 PROCEDURE — 99214 OFFICE O/P EST MOD 30 MIN: CPT | Performed by: PHYSICIAN ASSISTANT

## 2024-06-11 RX ORDER — ACETAZOLAMIDE 125 MG/1
125 TABLET ORAL 2 TIMES DAILY
Qty: 60 TABLET | Refills: 11 | Status: SHIPPED | OUTPATIENT
Start: 2024-06-11

## 2024-06-11 NOTE — PROGRESS NOTES
Arturo Christie is a 39 y.o. female is here today for follow-up.    History of Present Illness  Tolerating medication at the present level and not having further symptoms.  Compliant with eye examinations.       The following portions of the patient's history were reviewed and updated as appropriate: allergies, current medications, past family history, past medical history, past social history, past surgical history and problem list.    Review of Systems   Constitutional: Negative.    HENT: Negative.     Eyes: Negative.    Respiratory: Negative.     Cardiovascular: Negative.    Neurological:  Positive for headache.   Psychiatric/Behavioral: Negative.           Current Outpatient Medications:     albuterol sulfate  (90 Base) MCG/ACT inhaler, Inhale 2 puffs Every 4 (Four) Hours As Needed for Wheezing., Disp: 8 g, Rfl: 1    busPIRone (BUSPAR) 10 MG tablet, TAKE 0.5-1 TABLETS BY MOUTH DAILY. (Patient taking differently: Take 0.5-1 tablets by mouth Daily As Needed.), Disp: 30 tablet, Rfl: 3    citalopram (CeleXA) 20 MG tablet, Take 1 tablet by mouth Daily., Disp: 90 tablet, Rfl: 1    fluticasone (FLONASE) 50 MCG/ACT nasal spray, 2 sprays into the nostril(s) as directed by provider Daily., Disp: 16 g, Rfl: 3    loratadine (CLARITIN) 10 MG tablet, Take 1 tablet by mouth Daily., Disp: 30 tablet, Rfl: 5    nystatin (MYCOSTATIN) 267478 UNIT/GM cream, Apply 1 Application topically to the appropriate area as directed 3 (Three) Times a Day., Disp: 30 g, Rfl: 3    nystatin (MYCOSTATIN) 082911 UNIT/GM powder, Apply  topically to the appropriate area as directed 4 (Four) Times a Day., Disp: 30 g, Rfl: 3    acetaZOLAMIDE (DIAMOX) 125 MG tablet, Take 1 tablet by mouth 2 (Two) Times a Day., Disp: 60 tablet, Rfl: 11     Objective   Physical Exam  Vitals and nursing note reviewed.   Eyes:      General: Vision grossly intact. Gaze aligned appropriately.   Cardiovascular:      Rate and Rhythm: Normal rate.   Pulmonary:       Effort: Pulmonary effort is normal.   Neurological:      Mental Status: She is alert and oriented to person, place, and time.      GCS: GCS eye subscore is 4. GCS verbal subscore is 5. GCS motor subscore is 6.      Cranial Nerves: Cranial nerves 2-12 are intact.      Sensory: Sensation is intact.      Motor: Motor function is intact.      Coordination: Coordination is intact.      Gait: Gait is intact.      Deep Tendon Reflexes: Reflexes are normal and symmetric.   Psychiatric:         Attention and Perception: Attention normal.         Mood and Affect: Mood normal.         Speech: Speech normal.         Behavior: Behavior normal.         Cognition and Memory: Cognition normal.           Assessment & Plan   Diagnoses and all orders for this visit:    1. Episodic migraine (Primary)    2. Intractable chronic migraine without aura and without status migrainosus    3. Pseudotumor cerebri  -     acetaZOLAMIDE (DIAMOX) 125 MG tablet; Take 1 tablet by mouth 2 (Two) Times a Day.  Dispense: 60 tablet; Refill: 11      Doing well on low-dose Diamox.  Will continue this.  Follow-up as scheduled.               Dictated utilizing Dragon dictation.

## 2024-06-18 ENCOUNTER — HOSPITAL ENCOUNTER (OUTPATIENT)
Dept: MAMMOGRAPHY | Facility: HOSPITAL | Age: 39
Discharge: HOME | End: 2024-06-18
Admitting: NURSE PRACTITIONER
Payer: COMMERCIAL

## 2024-06-18 PROCEDURE — 77067 SCR MAMMO BI INCL CAD: CPT

## 2024-06-18 PROCEDURE — 77063 BREAST TOMOSYNTHESIS BI: CPT

## 2024-07-26 DIAGNOSIS — J45.909 ASTHMA DUE TO SEASONAL ALLERGIES: ICD-10-CM

## 2024-07-26 RX ORDER — LORATADINE 10 MG/1
10 TABLET ORAL DAILY
Qty: 30 TABLET | Refills: 5 | Status: SHIPPED | OUTPATIENT
Start: 2024-07-26

## 2024-07-26 NOTE — TELEPHONE ENCOUNTER
Rx Refill Note  Requested Prescriptions     Pending Prescriptions Disp Refills    loratadine (CLARITIN) 10 MG tablet [Pharmacy Med Name: LORATADINE 10MG TABLET] 30 tablet 5     Sig: TAKE 1 TABLET BY MOUTH DAILY.      Last office visit with prescribing clinician: Visit date not found   Last telemedicine visit with prescribing clinician: Visit date not found   Next office visit with prescribing clinician: 8/22/2024                         Would you like a call back once the refill request has been completed: [] Yes [] No    If the office needs to give you a call back, can they leave a voicemail: [] Yes [] No    Davie Sandoval MA  07/26/24, 15:21 CDT

## 2024-08-22 ENCOUNTER — TELEPHONE (OUTPATIENT)
Dept: INTERNAL MEDICINE | Facility: CLINIC | Age: 39
End: 2024-08-22

## 2024-09-16 DIAGNOSIS — F41.9 ANXIETY AND DEPRESSION: ICD-10-CM

## 2024-09-16 DIAGNOSIS — F32.A ANXIETY AND DEPRESSION: ICD-10-CM

## 2024-09-16 RX ORDER — CITALOPRAM HYDROBROMIDE 20 MG/1
20 TABLET ORAL DAILY
Qty: 90 TABLET | Refills: 1 | Status: SHIPPED | OUTPATIENT
Start: 2024-09-16

## 2024-09-16 RX ORDER — FLUTICASONE PROPIONATE 50 MCG
SPRAY, SUSPENSION (ML) NASAL
Qty: 16 G | Refills: 3 | Status: SHIPPED | OUTPATIENT
Start: 2024-09-16

## 2024-09-25 ENCOUNTER — OFFICE VISIT (OUTPATIENT)
Dept: INTERNAL MEDICINE | Facility: CLINIC | Age: 39
End: 2024-09-25
Payer: COMMERCIAL

## 2024-09-25 ENCOUNTER — LAB (OUTPATIENT)
Dept: LAB | Facility: HOSPITAL | Age: 39
End: 2024-09-25
Payer: COMMERCIAL

## 2024-09-25 VITALS
WEIGHT: 221.5 LBS | HEIGHT: 61 IN | BODY MASS INDEX: 41.82 KG/M2 | DIASTOLIC BLOOD PRESSURE: 84 MMHG | HEART RATE: 67 BPM | SYSTOLIC BLOOD PRESSURE: 116 MMHG | OXYGEN SATURATION: 98 % | RESPIRATION RATE: 16 BRPM

## 2024-09-25 DIAGNOSIS — F32.A ANXIETY AND DEPRESSION: ICD-10-CM

## 2024-09-25 DIAGNOSIS — B37.2 YEAST DERMATITIS: ICD-10-CM

## 2024-09-25 DIAGNOSIS — R63.5 WEIGHT GAIN: ICD-10-CM

## 2024-09-25 DIAGNOSIS — J30.2 SEASONAL ALLERGIES: ICD-10-CM

## 2024-09-25 DIAGNOSIS — Z00.01 ANNUAL VISIT FOR GENERAL ADULT MEDICAL EXAMINATION WITH ABNORMAL FINDINGS: ICD-10-CM

## 2024-09-25 DIAGNOSIS — G93.2 PSEUDOTUMOR CEREBRI: Primary | ICD-10-CM

## 2024-09-25 DIAGNOSIS — J45.20 MILD INTERMITTENT ASTHMA WITHOUT COMPLICATION: ICD-10-CM

## 2024-09-25 DIAGNOSIS — J45.909 ASTHMA DUE TO SEASONAL ALLERGIES: ICD-10-CM

## 2024-09-25 DIAGNOSIS — F41.9 ANXIETY AND DEPRESSION: ICD-10-CM

## 2024-09-25 LAB
ALBUMIN SERPL-MCNC: 4.2 G/DL (ref 3.5–5.2)
ALBUMIN/GLOB SERPL: 1.3 G/DL
ALP SERPL-CCNC: 62 U/L (ref 39–117)
ALT SERPL W P-5'-P-CCNC: 16 U/L (ref 1–33)
ANION GAP SERPL CALCULATED.3IONS-SCNC: 11 MMOL/L (ref 5–15)
AST SERPL-CCNC: 27 U/L (ref 1–32)
BILIRUB SERPL-MCNC: 0.2 MG/DL (ref 0–1.2)
BUN SERPL-MCNC: 10 MG/DL (ref 6–20)
BUN/CREAT SERPL: 13.7 (ref 7–25)
CALCIUM SPEC-SCNC: 9.2 MG/DL (ref 8.6–10.5)
CHLORIDE SERPL-SCNC: 102 MMOL/L (ref 98–107)
CHOLEST SERPL-MCNC: 203 MG/DL (ref 0–200)
CO2 SERPL-SCNC: 24 MMOL/L (ref 22–29)
CREAT SERPL-MCNC: 0.73 MG/DL (ref 0.57–1)
DEPRECATED RDW RBC AUTO: 43.7 FL (ref 37–54)
EGFRCR SERPLBLD CKD-EPI 2021: 106.1 ML/MIN/1.73
ERYTHROCYTE [DISTWIDTH] IN BLOOD BY AUTOMATED COUNT: 13 % (ref 12.3–15.4)
GLOBULIN UR ELPH-MCNC: 3.2 GM/DL
GLUCOSE SERPL-MCNC: 87 MG/DL (ref 65–99)
HBA1C MFR BLD: 5.3 % (ref 4.8–5.6)
HCT VFR BLD AUTO: 42 % (ref 34–46.6)
HDLC SERPL-MCNC: 65 MG/DL (ref 40–60)
HGB BLD-MCNC: 13.5 G/DL (ref 12–15.9)
LDLC SERPL CALC-MCNC: 120 MG/DL (ref 0–100)
LDLC/HDLC SERPL: 1.82 {RATIO}
MCH RBC QN AUTO: 29.3 PG (ref 26.6–33)
MCHC RBC AUTO-ENTMCNC: 32.1 G/DL (ref 31.5–35.7)
MCV RBC AUTO: 91.3 FL (ref 79–97)
PLATELET # BLD AUTO: 260 10*3/MM3 (ref 140–450)
PMV BLD AUTO: 10.2 FL (ref 6–12)
POTASSIUM SERPL-SCNC: 4.7 MMOL/L (ref 3.5–5.2)
PROT SERPL-MCNC: 7.4 G/DL (ref 6–8.5)
RBC # BLD AUTO: 4.6 10*6/MM3 (ref 3.77–5.28)
SODIUM SERPL-SCNC: 137 MMOL/L (ref 136–145)
TRIGL SERPL-MCNC: 99 MG/DL (ref 0–150)
TSH SERPL DL<=0.05 MIU/L-ACNC: 2.94 UIU/ML (ref 0.27–4.2)
VLDLC SERPL-MCNC: 18 MG/DL (ref 5–40)
WBC NRBC COR # BLD AUTO: 7 10*3/MM3 (ref 3.4–10.8)

## 2024-09-25 PROCEDURE — 99214 OFFICE O/P EST MOD 30 MIN: CPT | Performed by: INTERNAL MEDICINE

## 2024-09-25 PROCEDURE — 36415 COLL VENOUS BLD VENIPUNCTURE: CPT

## 2024-09-25 PROCEDURE — 80061 LIPID PANEL: CPT

## 2024-09-25 PROCEDURE — 1159F MED LIST DOCD IN RCRD: CPT | Performed by: INTERNAL MEDICINE

## 2024-09-25 PROCEDURE — 1160F RVW MEDS BY RX/DR IN RCRD: CPT | Performed by: INTERNAL MEDICINE

## 2024-09-25 PROCEDURE — 80050 GENERAL HEALTH PANEL: CPT

## 2024-09-25 PROCEDURE — 83036 HEMOGLOBIN GLYCOSYLATED A1C: CPT

## 2024-09-25 RX ORDER — ALBUTEROL SULFATE 90 UG/1
2 INHALANT RESPIRATORY (INHALATION) EVERY 4 HOURS PRN
Qty: 8 G | Refills: 1 | Status: SHIPPED | OUTPATIENT
Start: 2024-09-25

## 2024-09-25 RX ORDER — TERBINAFINE HYDROCHLORIDE 250 MG/1
250 TABLET ORAL DAILY
Qty: 84 TABLET | Refills: 0 | Status: SHIPPED | OUTPATIENT
Start: 2024-09-25

## 2024-09-25 RX ORDER — NYSTATIN 100000 [USP'U]/G
POWDER TOPICAL 4 TIMES DAILY
Qty: 30 G | Refills: 3 | Status: SHIPPED | OUTPATIENT
Start: 2024-09-25

## 2024-09-25 RX ORDER — NYSTATIN 100000 U/G
1 CREAM TOPICAL 3 TIMES DAILY
Qty: 30 G | Refills: 3 | Status: SHIPPED | OUTPATIENT
Start: 2024-09-25

## 2024-10-14 ENCOUNTER — OFFICE VISIT (OUTPATIENT)
Dept: NEUROLOGY | Facility: CLINIC | Age: 39
End: 2024-10-14
Payer: COMMERCIAL

## 2024-10-14 VITALS
RESPIRATION RATE: 16 BRPM | WEIGHT: 214.2 LBS | HEART RATE: 60 BPM | BODY MASS INDEX: 40.44 KG/M2 | DIASTOLIC BLOOD PRESSURE: 98 MMHG | HEIGHT: 61 IN | SYSTOLIC BLOOD PRESSURE: 126 MMHG

## 2024-10-14 DIAGNOSIS — G43.909 EPISODIC MIGRAINE: ICD-10-CM

## 2024-10-14 DIAGNOSIS — G93.2 PSEUDOTUMOR CEREBRI: Primary | ICD-10-CM

## 2024-10-14 PROCEDURE — 1160F RVW MEDS BY RX/DR IN RCRD: CPT | Performed by: PHYSICIAN ASSISTANT

## 2024-10-14 PROCEDURE — 1159F MED LIST DOCD IN RCRD: CPT | Performed by: PHYSICIAN ASSISTANT

## 2024-10-14 PROCEDURE — 99214 OFFICE O/P EST MOD 30 MIN: CPT | Performed by: PHYSICIAN ASSISTANT

## 2024-10-14 NOTE — PROGRESS NOTES
Subjective   Kristen Christie is a 39 y.o. female is here today for follow-up concerning multifactorial headache including migraine phenomena and pseudotumor cerebri.    History of Present Illness  Recently the patient's had more consistent and frequent headaches and symptoms that remind her of her previous symptoms when spinal fluid pressure was elevated.  Migraines are such has not been as problematic.       The following portions of the patient's history were reviewed and updated as appropriate: allergies, current medications, past family history, past medical history, past social history, past surgical history and problem list.    Review of Systems   Constitutional:  Positive for unexpected weight gain.   HENT:  Positive for congestion.    Eyes:  Positive for photophobia, pain and visual disturbance.   Respiratory:  Positive for shortness of breath and wheezing.    Cardiovascular: Negative.    Gastrointestinal: Negative.    Musculoskeletal:  Positive for myalgias and neck pain.   Allergic/Immunologic: Positive for environmental allergies.   Neurological:  Positive for headache.   Psychiatric/Behavioral:  The patient is nervous/anxious.          Current Outpatient Medications:     albuterol sulfate  (90 Base) MCG/ACT inhaler, Inhale 2 puffs Every 4 (Four) Hours As Needed for Wheezing., Disp: 8 g, Rfl: 1    busPIRone (BUSPAR) 10 MG tablet, TAKE 0.5-1 TABLETS BY MOUTH DAILY. (Patient taking differently: Take 0.5-1 tablets by mouth Daily As Needed.), Disp: 30 tablet, Rfl: 3    citalopram (CeleXA) 20 MG tablet, Take 1 tablet by mouth Daily., Disp: 90 tablet, Rfl: 1    fluticasone (FLONASE) 50 MCG/ACT nasal spray, USE 2 SPRAYS IN THE NOSTRIL ONCE DAILY, Disp: 16 g, Rfl: 3    loratadine (CLARITIN) 10 MG tablet, Take 1 tablet by mouth Daily., Disp: 30 tablet, Rfl: 5    nystatin (MYCOSTATIN) 726565 UNIT/GM cream, Apply 1 Application topically to the appropriate area as directed 3 (Three) Times a Day., Disp: 30 g, Rfl:  3    nystatin (MYCOSTATIN) [id number] UNIT/GM powder, Apply  topically to the appropriate area as directed 4 (Four) Times a Day., Disp: 30 g, Rfl: 3    terbinafine (lamiSIL) 250 MG tablet, Take 1 tablet by mouth Daily., Disp: 84 tablet, Rfl: 0     Objective   Physical Exam  Vitals and nursing note reviewed.   Eyes:      General: Vision grossly intact. Gaze aligned appropriately.   Cardiovascular:      Rate and Rhythm: Normal rate.   Pulmonary:      Effort: Pulmonary effort is normal.   Neurological:      Mental Status: She is alert and oriented to person, place, and time.      GCS: GCS eye subscore is 4. GCS verbal subscore is 5. GCS motor subscore is 6.      Cranial Nerves: Cranial nerves 2-12 are intact.      Sensory: Sensation is intact.      Motor: Motor function is intact.      Coordination: Coordination is intact.      Gait: Gait is intact.      Deep Tendon Reflexes: Reflexes are normal and symmetric.      Comments: Papilledema is not noted   Psychiatric:         Attention and Perception: Attention normal.         Mood and Affect: Mood normal.         Speech: Speech normal.         Behavior: Behavior normal.         Cognition and Memory: Cognition normal.       CT venogram head (04/11/2024 10:59)    Venous sinuses are normal    IR LUMBAR PUNCTURE DIAGNOSTIC (05/13/2024 12:35)    Opening pressure on 15 May 2024 was 23 cmH2O    MRI Brain Without Contrast (04/25/2024 10:47)    Findings consistent with pseudotumor cerebri    Assessment & Plan   Diagnoses and all orders for this visit:    1. Pseudotumor cerebri (Primary)  -     Obtain Informed Consent; Standing  -     Notify Provider if Patient Taking Blood Thinning Agents; Standing  -     Check & Record Opening & Closing Pressures (LP); Standing  -     Glucose, CSF - Cerebrospinal Fluid, Lumbar Puncture; Standing  -     Protein, CSF - Cerebrospinal Fluid, Lumbar Puncture; Standing  -     Cell Count With Differential, CSF Use CSF Tube: 1; Standing  -     Cell Count  With Differential, CSF; Standing  -     CSF Tube - Cerebrospinal Fluid, Lumbar Puncture; Standing  -     CSF Tube - Cerebrospinal Fluid, Lumbar Puncture; Standing  -     IR Lumbar Puncture Diagnostic; Future    2. Episodic migraine      Given resurgence of symptoms the patient is agreeable to proceeding with a lumbar puncture.  Will proceed with opening and closing pressures and therapeutic spinal tap as well as routine studies.    Reviewed and discussed treatment for her episodic migraine which has been somewhat quiescent of late.  Certainly many options to resume treatment for this if it becomes more of an issue.    Follow-up after her LP.               Dictated utilizing Dragon dictation.

## 2024-10-29 ENCOUNTER — PATIENT MESSAGE (OUTPATIENT)
Dept: INTERNAL MEDICINE | Facility: CLINIC | Age: 39
End: 2024-10-29
Payer: COMMERCIAL

## 2024-10-29 DIAGNOSIS — E66.09 CLASS 2 OBESITY DUE TO EXCESS CALORIES WITHOUT SERIOUS COMORBIDITY WITH BODY MASS INDEX (BMI) OF 37.0 TO 37.9 IN ADULT: Primary | ICD-10-CM

## 2024-10-29 DIAGNOSIS — E66.812 CLASS 2 OBESITY DUE TO EXCESS CALORIES WITHOUT SERIOUS COMORBIDITY WITH BODY MASS INDEX (BMI) OF 37.0 TO 37.9 IN ADULT: Primary | ICD-10-CM

## 2024-10-30 RX ORDER — PHENTERMINE HYDROCHLORIDE 15 MG/1
15 CAPSULE ORAL EVERY MORNING
Qty: 30 CAPSULE | Refills: 1 | Status: SHIPPED | OUTPATIENT
Start: 2024-10-30

## 2024-11-04 ENCOUNTER — TELEPHONE (OUTPATIENT)
Dept: INTERNAL MEDICINE | Facility: CLINIC | Age: 39
End: 2024-11-04
Payer: COMMERCIAL

## 2024-11-04 NOTE — TELEPHONE ENCOUNTER
Pt called asking if there is anything we can do here for an ingrown toenail or if she needs to be referred to someone else.

## 2024-11-04 NOTE — TELEPHONE ENCOUNTER
It rather depends. Sometimes just needs compresses and antibiotic. Other times, may need to be treated more aggressively through podiatry. Could be seen in the office or via video visit. I could work her in in the next 1-2 days if nothing obviously available.

## 2024-11-05 ENCOUNTER — OFFICE VISIT (OUTPATIENT)
Dept: INTERNAL MEDICINE | Facility: CLINIC | Age: 39
End: 2024-11-05
Payer: COMMERCIAL

## 2024-11-05 VITALS
RESPIRATION RATE: 16 BRPM | HEIGHT: 61 IN | WEIGHT: 212.4 LBS | OXYGEN SATURATION: 98 % | HEART RATE: 64 BPM | DIASTOLIC BLOOD PRESSURE: 81 MMHG | SYSTOLIC BLOOD PRESSURE: 111 MMHG | BODY MASS INDEX: 40.1 KG/M2

## 2024-11-05 DIAGNOSIS — J45.909 ASTHMA DUE TO SEASONAL ALLERGIES: ICD-10-CM

## 2024-11-05 DIAGNOSIS — L03.031 PARONYCHIA OF GREAT TOE OF RIGHT FOOT: Primary | ICD-10-CM

## 2024-11-05 DIAGNOSIS — F41.9 ANXIETY AND DEPRESSION: ICD-10-CM

## 2024-11-05 DIAGNOSIS — F32.A ANXIETY AND DEPRESSION: ICD-10-CM

## 2024-11-05 PROCEDURE — 1159F MED LIST DOCD IN RCRD: CPT | Performed by: INTERNAL MEDICINE

## 2024-11-05 PROCEDURE — 99214 OFFICE O/P EST MOD 30 MIN: CPT | Performed by: INTERNAL MEDICINE

## 2024-11-05 PROCEDURE — 1160F RVW MEDS BY RX/DR IN RCRD: CPT | Performed by: INTERNAL MEDICINE

## 2024-11-05 RX ORDER — BUSPIRONE HYDROCHLORIDE 10 MG/1
5-10 TABLET ORAL DAILY PRN
Qty: 60 TABLET | Refills: 2 | Status: SHIPPED | OUTPATIENT
Start: 2024-11-05

## 2024-11-05 RX ORDER — DOXYCYCLINE 100 MG/1
100 CAPSULE ORAL 2 TIMES DAILY
Qty: 20 CAPSULE | Refills: 0 | Status: SHIPPED | OUTPATIENT
Start: 2024-11-05 | End: 2024-11-15

## 2024-11-05 RX ORDER — LORATADINE 10 MG/1
10 TABLET ORAL DAILY
Qty: 30 TABLET | Refills: 5 | Status: SHIPPED | OUTPATIENT
Start: 2024-11-05

## 2024-11-05 NOTE — PROGRESS NOTES
CC: great toe pain    History:  Kristen Christie is a 39 y.o. female   History of Present Illness  The patient presents for evaluation of an ingrown toenail.    She has been experiencing discomfort from an ingrown toenail on her right big toe for approximately 3.5 weeks. The pain is primarily located at the front of her toe. She frequently suffers from ingrown toenails, but this instance is particularly severe. She has noticed some pus and has been applying peroxide daily. A few days ago, the condition worsened significantly. She attempted to drain the area, resulting in a significant amount of blood but minimal pus. She is seeking a referral to a specialist.        ROS:  Review of Systems   Respiratory:  Negative for shortness of breath.    Cardiovascular:  Negative for chest pain.   Musculoskeletal:  Positive for arthralgias.   Skin:  Positive for wound.        reports that she quit smoking about 12 years ago. Her smoking use included cigarettes. She started smoking about 22 years ago. She has a 10 pack-year smoking history. She has been exposed to tobacco smoke. She has never used smokeless tobacco. She reports that she does not currently use alcohol. She reports that she does not use drugs.      Current Outpatient Medications:     albuterol sulfate  (90 Base) MCG/ACT inhaler, Inhale 2 puffs Every 4 (Four) Hours As Needed for Wheezing., Disp: 8 g, Rfl: 1    busPIRone (BUSPAR) 10 MG tablet, Take 0.5-1 tablets by mouth Daily As Needed (anxiety)., Disp: 60 tablet, Rfl: 2    citalopram (CeleXA) 20 MG tablet, Take 1 tablet by mouth Daily., Disp: 90 tablet, Rfl: 1    fluticasone (FLONASE) 50 MCG/ACT nasal spray, USE 2 SPRAYS IN THE NOSTRIL ONCE DAILY, Disp: 16 g, Rfl: 3    loratadine (CLARITIN) 10 MG tablet, Take 1 tablet by mouth Daily., Disp: 30 tablet, Rfl: 5    nystatin (MYCOSTATIN) 663195 UNIT/GM cream, Apply 1 Application topically to the appropriate area as directed 3 (Three) Times a Day., Disp: 30 g, Rfl:  "3    nystatin (MYCOSTATIN) 757864 UNIT/GM powder, Apply  topically to the appropriate area as directed 4 (Four) Times a Day., Disp: 30 g, Rfl: 3    phentermine 15 MG capsule, Take 1 capsule by mouth Every Morning., Disp: 30 capsule, Rfl: 1    terbinafine (lamiSIL) 250 MG tablet, Take 1 tablet by mouth Daily., Disp: 84 tablet, Rfl: 0    doxycycline (VIBRAMYCIN) 100 MG capsule, Take 1 capsule by mouth 2 (Two) Times a Day for 10 days., Disp: 20 capsule, Rfl: 0    OBJECTIVE:  /81 (BP Location: Left arm, Patient Position: Sitting, Cuff Size: Adult)   Pulse 64   Resp 16   Ht 154.9 cm (60.98\")   Wt 96.3 kg (212 lb 6.4 oz)   LMP  (LMP Unknown)   SpO2 98%   BMI 40.16 kg/m²    Physical Exam  Constitutional:       General: She is not in acute distress.  Pulmonary:      Effort: Pulmonary effort is normal. No respiratory distress.   Skin:     Comments: Paronychia of the lateral aspect of the right great toenail.  There is granulation tissue overlying the nailbed where part of the nail has been removed by her at home.  However, she has ongoing pain in the skin distal to the nail as well as the lateral aspect.  There is a small amount of serosanguineous drainage, but no erythema, induration, or obvious pus.   Neurological:      Mental Status: She is alert and oriented to person, place, and time.           Assessment/Plan     Diagnoses and all orders for this visit:    1. Paronychia of great toe of right foot (Primary)  -     doxycycline (VIBRAMYCIN) 100 MG capsule; Take 1 capsule by mouth 2 (Two) Times a Day for 10 days.  Dispense: 20 capsule; Refill: 0  -     Ambulatory Referral to Podiatry  We do have erythema and potential for infection, as such, she is encouraged to maintain cleanliness at home and we will initiate therapy with doxycycline to cover for skin pathogens.  We will send a referral to podiatry given the recurrent nature of her paronychia.    2. Asthma due to seasonal allergies  -     loratadine " (CLARITIN) 10 MG tablet; Take 1 tablet by mouth Daily.  Dispense: 30 tablet; Refill: 5    3. Anxiety and depression  -     busPIRone (BUSPAR) 10 MG tablet; Take 0.5-1 tablets by mouth Daily As Needed (anxiety).  Dispense: 60 tablet; Refill: 2  Well controlled.         An After Visit Summary was printed and given to the patient at discharge.  Return for Next scheduled follow up.      Patient or patient representative verbalized consent for the use of Ambient Listening during the visit with  Dany Jones DO for chart documentation. 11/5/2024  08:15 STEPHANIE Jones D.O. 11/5/2024   Electronically signed.

## 2024-11-12 ENCOUNTER — TELEPHONE (OUTPATIENT)
Dept: NEUROLOGY | Facility: CLINIC | Age: 39
End: 2024-11-12
Payer: COMMERCIAL
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
## 2024-12-02 ENCOUNTER — TELEPHONE (OUTPATIENT)
Dept: NEUROLOGY | Facility: CLINIC | Age: 39
End: 2024-12-02

## 2024-12-02 ENCOUNTER — APPOINTMENT (OUTPATIENT)
Dept: NEUROLOGY | Facility: CLINIC | Age: 39
End: 2024-12-02
Payer: COMMERCIAL

## 2024-12-02 VITALS
RESPIRATION RATE: 18 BRPM | DIASTOLIC BLOOD PRESSURE: 68 MMHG | TEMPERATURE: 98.7 F | SYSTOLIC BLOOD PRESSURE: 110 MMHG | HEART RATE: 65 BPM

## 2024-12-02 DIAGNOSIS — H53.9 VISUAL DISTURBANCE: ICD-10-CM

## 2024-12-02 DIAGNOSIS — R11.0 NAUSEA: ICD-10-CM

## 2024-12-02 DIAGNOSIS — G43.E01 CHRONIC MIGRAINE WITH AURA AND WITH STATUS MIGRAINOSUS, NOT INTRACTABLE: ICD-10-CM

## 2024-12-02 DIAGNOSIS — G93.2 IIH (IDIOPATHIC INTRACRANIAL HYPERTENSION): Primary | ICD-10-CM

## 2024-12-02 PROCEDURE — G2212 PROLONG OUTPT/OFFICE VIS: HCPCS

## 2024-12-02 PROCEDURE — 99205 OFFICE O/P NEW HI 60 MIN: CPT

## 2024-12-02 RX ORDER — CITALOPRAM 20 MG/1
20 TABLET, FILM COATED ORAL DAILY
COMMUNITY

## 2024-12-02 RX ORDER — RIZATRIPTAN BENZOATE 10 MG/1
10 TABLET ORAL ONCE AS NEEDED
Qty: 9 TABLET | Refills: 2 | Status: SHIPPED | OUTPATIENT
Start: 2024-12-02

## 2024-12-02 RX ORDER — LORATADINE 10 MG/1
10 TABLET ORAL
COMMUNITY
Start: 2024-11-05

## 2024-12-02 RX ORDER — BUSPIRONE HYDROCHLORIDE 10 MG/1
5-10 TABLET ORAL AS NEEDED
COMMUNITY
Start: 2024-11-05

## 2024-12-02 RX ORDER — ACETAZOLAMIDE 125 MG/1
125 TABLET ORAL 3 TIMES DAILY
Qty: 90 TABLET | Refills: 0 | Status: SHIPPED | OUTPATIENT
Start: 2024-12-02 | End: 2025-01-01

## 2024-12-02 RX ORDER — TERBINAFINE HYDROCHLORIDE 250 MG/1
250 TABLET ORAL
COMMUNITY
Start: 2024-09-25

## 2024-12-02 RX ORDER — ONDANSETRON 4 MG/1
4 TABLET, FILM COATED ORAL EVERY 8 HOURS PRN
Qty: 30 TABLET | Refills: 2 | Status: SHIPPED | OUTPATIENT
Start: 2024-12-02

## 2024-12-02 ASSESSMENT — PAIN SCALES - GENERAL: PAINLEVEL_OUTOF10: 0-NO PAIN

## 2024-12-02 NOTE — TELEPHONE ENCOUNTER
"Called and spoke to pharmacy and she stated that she was wondering if anyone in the office was covered under \"[place] medicare\" . States patient might have to pay a little more since we are not covered in this.   "

## 2024-12-02 NOTE — PATIENT INSTRUCTIONS
Dear Leticia,    Thank you for coming to [facility]. It was a pleasure caring for you today.  The best way to contact me for medication refills or questions about your care is through [employer]  Otherwise, you can contact the office by phone: [phone].    Mitali Mroton, APRN-CNP    Migraines:  Suggestions for preventing or controlling migraines:  Riboflavin (vitamin B2) 200 mg twice a day may be helpful. Side effects can include diarrhea, increased urination and bright yellow urine. This should not be taken by kids.   CoQ10 100 mg daily up to three times per day may also be helpful. Side effects can include nausea, diarrhea, and dyspepsia.   Magnesium (oxelate) 400- 600 mg daily for prevention. Magnesium can also help with menstrual migraines. Side effects can include diarrhea and flushing.   According to the American Academy of Neurology, there is not sufficient evidence that melatonin helps prevent or treat migraines, so it is not currently recommended for this use. Butterbur is also not currently recommended for migraine prevention as it can cause liver toxicity.   Avoid triggers that can cause or worsen migraines (food, lack of sleep, stress, etc.)  Headache frequency is noted to be increased in those with low physical activity, poor sleep, high BMI, smoking, and caffeine overuse. Managing stress with relaxation techniques and getting 20-30 minutes of aerobic exercise at least 4 times per week can help decrease headache frequency and intensity.   Keep a diary of your headaches to note triggers, how often you get them, how long they last, associated symptoms, what medicines you took and if they helped, and any other helpful information   Avoid taking rescue medication (NOT preventative medication) more than 3 days a week (includes both prescription and over the counter meds)  Take your preventative medication as directed. Let me know if you have side effects or problems with the  medication. Do not suddenly stop the medication.    *If you are having difficulty breaking the cycle of medication overuse headaches, please message me through [employer] or call the office.

## 2024-12-03 ENCOUNTER — TELEPHONE (OUTPATIENT)
Dept: NEUROLOGY | Facility: CLINIC | Age: 39
End: 2024-12-03
Payer: COMMERCIAL

## 2024-12-03 NOTE — TELEPHONE ENCOUNTER
I left a voicemail letting Ms. Christie know that we have received a medical records request to send out records to [facility] in [place]; however, I could not find a fax or department name of where the records need to go. I asked Kristen to call the office back with this information.      OK FOR HUB TO RELAY

## 2024-12-28 DIAGNOSIS — J30.2 SEASONAL ALLERGIES: ICD-10-CM

## 2024-12-30 ENCOUNTER — TELEPHONE (OUTPATIENT)
Dept: INTERNAL MEDICINE | Facility: CLINIC | Age: 39
End: 2024-12-30
Payer: COMMERCIAL

## 2024-12-30 NOTE — TELEPHONE ENCOUNTER
Rx Refill Note  Requested Prescriptions     Pending Prescriptions Disp Refills    albuterol sulfate  (90 Base) MCG/ACT inhaler 8 g 1     Sig: Inhale 2 puffs Every 4 (Four) Hours As Needed for Wheezing.      Last office visit with prescribing clinician: 11/5/2024   Last telemedicine visit with prescribing clinician: Visit date not found   Next office visit with prescribing clinician: 12/30/2024                         Would you like a call back once the refill request has been completed: [] Yes [] No    If the office needs to give you a call back, can they leave a voicemail: [] Yes [] No    Davie Sandoval MA  12/30/24, 11:15 CST

## 2024-12-30 NOTE — TELEPHONE ENCOUNTER
Caller: Kristen Christie    Relationship: Self    Best call back number: [phone]    What medication are you requesting: ZOFRAN  4 MG  AND MAXALT 10 MG     What are your current symptoms: MIGRAINES WITH NAUSEA     How long have you been experiencing symptoms: AWHILE NOW     Have you had these symptoms before:    [x] Yes  [] No    Have you been treated for these symptoms before:   [x] Yes  [] No    If a prescription is needed, what is your preferred pharmacy and phone number: [facility] [place], [place] - [address] [place] [address] - [phone] [facility] [phone] FX     Additional notes: PATIENT SAW A NEUROLOGIST OUT OF NETWORK AND INSURANCE WILL NOT COVER MEDICATIONS PRESCRIBED THAT PROVIDER RECOMMENDED SHE ASK PCP

## 2024-12-31 ENCOUNTER — TELEPHONE (OUTPATIENT)
Dept: INTERNAL MEDICINE | Facility: CLINIC | Age: 39
End: 2024-12-31
Payer: COMMERCIAL

## 2024-12-31 RX ORDER — ALBUTEROL SULFATE 90 UG/1
2 INHALANT RESPIRATORY (INHALATION) EVERY 4 HOURS PRN
Qty: 8 G | Refills: 1 | Status: SHIPPED | OUTPATIENT
Start: 2024-12-31

## 2024-12-31 RX ORDER — RIZATRIPTAN BENZOATE 10 MG/1
10 TABLET ORAL ONCE AS NEEDED
Qty: 9 TABLET | Refills: 5 | Status: SHIPPED | OUTPATIENT
Start: 2024-12-31

## 2024-12-31 RX ORDER — ONDANSETRON 4 MG/1
4 TABLET, ORALLY DISINTEGRATING ORAL EVERY 8 HOURS PRN
Qty: 30 TABLET | Refills: 0 | Status: SHIPPED | OUTPATIENT
Start: 2024-12-31

## 2024-12-31 NOTE — TELEPHONE ENCOUNTER
PATIENT HAS CALLED, SHE IS ASKING FOR MAXALT TO BE FILLED FOR HER MIGRAINES.  SHE STATED THAT THE NEUROLOGIST THAT SHE WAS SCHEDULED WITH IS OUT OF HER NETWORK.   THE NEUROLOGIST SENT THE MAXALT TO [facility] AND THEY STATED THAT THE NEUROLOGIST IS OUT OF NETWORK AND THEY SUGGESTED THAT SHE CONTACT HER PCP TO GET IT REFILLED.      PATIENT STATED THAT SHE LINKED THE NEUROLOGY VISIT TO HER MY CHART.      PATIENT STATED THAT SHE HAS HAD A MIGRAINE FOR 4 DAYS.

## 2025-01-17 DIAGNOSIS — B37.2 YEAST DERMATITIS: ICD-10-CM

## 2025-01-17 DIAGNOSIS — E66.812 CLASS 2 OBESITY DUE TO EXCESS CALORIES WITHOUT SERIOUS COMORBIDITY WITH BODY MASS INDEX (BMI) OF 37.0 TO 37.9 IN ADULT: ICD-10-CM

## 2025-01-17 DIAGNOSIS — E66.09 CLASS 2 OBESITY DUE TO EXCESS CALORIES WITHOUT SERIOUS COMORBIDITY WITH BODY MASS INDEX (BMI) OF 37.0 TO 37.9 IN ADULT: ICD-10-CM

## 2025-01-17 RX ORDER — PHENTERMINE HYDROCHLORIDE 15 MG/1
15 CAPSULE ORAL EVERY MORNING
Qty: 30 CAPSULE | Refills: 1 | OUTPATIENT
Start: 2025-01-17

## 2025-01-17 RX ORDER — TERBINAFINE HYDROCHLORIDE 250 MG/1
250 TABLET ORAL DAILY
Qty: 24 TABLET | Refills: 2 | OUTPATIENT
Start: 2025-01-17

## 2025-01-17 RX ORDER — PHENTERMINE HYDROCHLORIDE 15 MG/1
15 CAPSULE ORAL EVERY MORNING
Qty: 30 CAPSULE | Refills: 1 | Status: SHIPPED | OUTPATIENT
Start: 2025-01-17

## 2025-01-17 RX ORDER — TERBINAFINE HYDROCHLORIDE 250 MG/1
250 TABLET ORAL DAILY
Qty: 84 TABLET | Refills: 0 | Status: SHIPPED | OUTPATIENT
Start: 2025-01-17

## 2025-01-17 NOTE — TELEPHONE ENCOUNTER
Rx Refill Note  Requested Prescriptions     Pending Prescriptions Disp Refills    terbinafine (lamiSIL) 250 MG tablet 84 tablet 0     Sig: Take 1 tablet by mouth Daily.    phentermine 15 MG capsule 30 capsule 1     Sig: Take 1 capsule by mouth Every Morning.      Last office visit with prescribing clinician: 11/5/2024   Last telemedicine visit with prescribing clinician: Visit date not found   Next office visit with prescribing clinician: 3/26/2025                         Would you like a call back once the refill request has been completed: [] Yes [] No    If the office needs to give you a call back, can they leave a voicemail: [] Yes [] No    Davie Sandoval MA  01/17/25, 11:31 CST

## 2025-01-28 ENCOUNTER — APPOINTMENT (OUTPATIENT)
Dept: NEUROLOGY | Facility: CLINIC | Age: 40
End: 2025-01-28
Payer: COMMERCIAL

## 2025-01-28 DIAGNOSIS — G93.2 IIH (IDIOPATHIC INTRACRANIAL HYPERTENSION): ICD-10-CM

## 2025-01-28 DIAGNOSIS — R11.0 NAUSEA: ICD-10-CM

## 2025-01-28 DIAGNOSIS — H53.9 VISUAL DISTURBANCE: ICD-10-CM

## 2025-01-28 DIAGNOSIS — G43.E01 CHRONIC MIGRAINE WITH AURA AND WITH STATUS MIGRAINOSUS, NOT INTRACTABLE: Primary | ICD-10-CM

## 2025-01-28 PROCEDURE — 99214 OFFICE O/P EST MOD 30 MIN: CPT

## 2025-01-28 NOTE — PROGRESS NOTES
"Chief Complaint   Patient presents with    Follow-up     Virtual or Telephone Consent  An interactive audio and video telecommunication system which permits real time communications between the patient (at the originating site) and provider (at the distant site) was utilized to provide this telehealth service.   Verbal consent was requested and obtained from Leticia Velasquez on this date, 02/07/25 for a telehealth visit.     Subjective   HPI  Leticia Velasquez is a 40 y.o. year old female who presents with chief complaint Chronic migraine with aura and with status migrainosus, not intractable [G43.E01]    Started Diamox on the 17th. Currently taking 125 mg at night- side effect of nausea, fatigue/ tiredness, numbness in mouth. Side effects subsided after 3 to 4 days.   Does endorse tingling in feet/ legs (did start 1-1.5 weeks before Diamox- constant)   Had about 4-5 migraines since last visit, one severe, lasting 3 days.  Triggers include  barometric pressure, wind, bending forward/ backwards. Aura sees stars in visual field prior to the onset of headaches.   Daily symptoms of confusion upon awakening, cold sweats/ nausea followed by a headache, sensitive ears when outside in the wind. Causes pain behind the right eye.   The headaches are usually supraorbital \"behind the bone\" always on the right side intense pressure. No excess lacrimation or rhinorrhea associated with it. She also endorses heaviness in the back of the neck/ sore muscle.   Headaches are worsened with exertion. Bending over and putting head back makes it worse- not instantly but within a few hours.   Associated nausea, photophobia, and phonophobia, cognitive fog, fatigue.   Treats with Rizatriptan- has only taken it once and did not feel a difference. Took it about the first of January. States she just went home and went to bed and slept for about 8 hours. Unsure if the HA made her sleep or the medication.     Work attendance or other daily activities " affected: still difficult to complete work.   Caffeine: about 2-3 cups of coffee per day   Sleep: no trouble falling or staying a sleep. Sleeps 8 hours at night. Also occasional naps for 1- 2 hours due to fatigue.     The patient denies the presence of any associated double vision, speech problems, confusion, facial or extremity weakness or numbness or problems with coordination. Denies other neurological history of seizures, stroke, or TIA.    Current/ past HA treatments:  Preventive:  Acetazolamide   No history of Topamax use, no hx of kidney stones    Rescue:  rizatriptan  No steroid burst  Ibuprofen  Tylenol  heat      Current Outpatient Medications:     busPIRone (Buspar) 10 mg tablet, Take 0.5-1 tablets (5-10 mg) by mouth if needed., Disp: , Rfl:     citalopram (CeleXA) 20 mg tablet, Take 1 tablet (20 mg) by mouth once daily., Disp: , Rfl:     loratadine (Claritin) 10 mg tablet, Take 1 tablet (10 mg) by mouth once daily., Disp: , Rfl:     ondansetron (Zofran) 4 mg tablet, Take 1 tablet (4 mg) by mouth every 8 hours if needed for nausea or vomiting for up to 90 doses., Disp: 30 tablet, Rfl: 2    rizatriptan (Maxalt) 10 mg tablet, Take 1 tablet (10 mg) by mouth 1 time if needed for migraine (May repeat in 2 hours if unresolved. Do not exceed 30 mg in 24 hours.) for up to 27 doses., Disp: 9 tablet, Rfl: 2    terbinafine (LamISIL) 250 mg tablet, Take 1 tablet (250 mg) by mouth once daily., Disp: , Rfl:     acetaZOLAMIDE (Diamox) 125 mg tablet, Take 1 tablet (125 mg) by mouth 3 times a day. Start with 1 daily at night x 1 week, then twice a day x 1 week, then 3 times a day., Disp: 90 tablet, Rfl: 0    No Known Allergies    ROS  As noted in HPI, otherwise all other systems have been reviewed are negative for complaint.     Objective   General Appearance: Leticia is well-developed, well-nourished, 40 y.o. year old female, in no acute distress. Makes good eye contact, is alert, interactive, and cooperative. Demonstrates  recent & remote memory recall. Subjective information consistent with objective assessment. *Assessment limited due to virtual visit.    Neurological Exam  Cranial Nerves  CN III, IV, VI: Extraocular movements intact bilaterally. Normal lids and orbits bilaterally.  CN VII: Full and symmetric facial movement.  CN VIII: Hearing is normal.  Assessment & Plan  Chronic migraine with aura and with status migrainosus, not intractable         IIH (idiopathic intracranial hypertension)         Visual disturbance         Nausea              ASSESSMENT/PLAN:  Checkup with general practitioner coming up.  Please have them Check B 12, Vitamin D-25  Ask about adding semaglutide (Ozempic or Wegovy) or trezepetide (Zep Bound)  Keep upcoming appointment with Dr. Champion  Try sumatriptan for migraine abortive therapy- will send to local pharmacy in [place] (or [place]- patient to notify which pharmacy.   Follow up 3 to 6 months, or sooner if needed

## 2025-02-05 RX ORDER — ACETAZOLAMIDE 125 MG/1
1 TABLET ORAL EVERY 12 HOURS SCHEDULED
COMMUNITY
Start: 2024-12-31

## 2025-02-06 ENCOUNTER — OFFICE VISIT (OUTPATIENT)
Dept: INTERNAL MEDICINE | Facility: CLINIC | Age: 40
End: 2025-02-06
Payer: MEDICAID

## 2025-02-06 VITALS
SYSTOLIC BLOOD PRESSURE: 105 MMHG | BODY MASS INDEX: 39.08 KG/M2 | DIASTOLIC BLOOD PRESSURE: 71 MMHG | HEART RATE: 71 BPM | TEMPERATURE: 98.2 F | OXYGEN SATURATION: 97 % | RESPIRATION RATE: 20 BRPM | HEIGHT: 61 IN | WEIGHT: 207 LBS

## 2025-02-06 DIAGNOSIS — G93.2 PSEUDOTUMOR CEREBRI: Primary | ICD-10-CM

## 2025-02-06 DIAGNOSIS — F32.A ANXIETY AND DEPRESSION: ICD-10-CM

## 2025-02-06 DIAGNOSIS — F41.9 ANXIETY AND DEPRESSION: ICD-10-CM

## 2025-02-06 PROCEDURE — 1160F RVW MEDS BY RX/DR IN RCRD: CPT | Performed by: NURSE PRACTITIONER

## 2025-02-06 PROCEDURE — 99214 OFFICE O/P EST MOD 30 MIN: CPT | Performed by: NURSE PRACTITIONER

## 2025-02-06 PROCEDURE — 1159F MED LIST DOCD IN RCRD: CPT | Performed by: NURSE PRACTITIONER

## 2025-02-06 NOTE — PROGRESS NOTES
Subjective     Chief Complaint   Patient presents with    Anxiety    Depression       History of Present Illness  The patient is a 40-year-old female who presents for weight loss, headaches, fungal infection, anxiety, and depression.    She has been actively pursuing weight loss, with the expectation that it will improve her symptoms. She has always been heavy, but she did not gain weight until the symptoms started. Her weight has decreased from 221 to 207 since September 2024. She is currently on a 15 mg dose of phentermine, which she reports as beneficial. She has no history of cardiac issues or murmurs. She is mindful of her diet and attempts to exercise, although certain activities such as crunches induce severe headaches. She recently refilled her phentermine prescription approximately 1.5 to 2 weeks ago. She has expressed interest in GLP injections for weight loss.    She has been experiencing headaches, which have shown improvement with Diamox treatment. She describes a sensation of tingling followed by burning, which intensifies when she is stationary and lessens during physical activity. She has been under the care of a neurologist, with whom she had a virtual consultation last week, and has a follow-up appointment scheduled for April 2025. She was initiated on Diamox 2.5 weeks prior to her last consultation. An appointment with a neuro-ophthalmologist has been arranged due to the presence of fluid around her ocular nerves as revealed by an MRI. She reports numbness in her mouth and face.    She has been on terbinafine for a fungal infection on her toes for the past 4 months, which has resulted in improvement. She also takes probiotic and prebiotic supplements, Candida kill, biotoxin binder, and gut renew powder. She has a significant amount of yeast under her nails and believes the Candida kill supplement is effective. She works as a , which involves physical activity.    Her anxiety  and depression are well-managed with her current medication regimen. She has observed a correlation between her intracranial pressure and anxiety levels, noting that her anxiety subsides when her pressure is low, such as after a lumbar puncture, and escalates when her pressure increases.    SOCIAL HISTORY  She does not smoke or vape.    MEDICATIONS  Diamox, phentermine, Celexa, BuSpar, terbinafine, probiotic, prebiotic, Candida kill, biotoxin binder, gut renew powder.          Otherwise complete ROS reviewed and negative except as mentioned in the HPI.    Past Medical History:   Past Medical History:   Diagnosis Date    Abnormal Pap smear of cervix     Anxiety     Asthma     childhood    Cervical dysplasia     COVID-19 virus infection 07/2021    again in 1/22    Depression     Headache 2024    Obesity     Polycystic ovary syndrome     Seasonal allergies      Past Surgical History:  Past Surgical History:   Procedure Laterality Date    CERVICAL BIOPSY  W/ LOOP ELECTRODE EXCISION      COLPOSCOPY      LEEP  2015    LUMBAR PUNCTURE      for migraine    TONSILLECTOMY  2004    WISDOM TOOTH EXTRACTION  2004     Social History:  reports that she quit smoking about 13 years ago. Her smoking use included cigarettes. She started smoking about 23 years ago. She has a 10 pack-year smoking history. She has been exposed to tobacco smoke. She has never used smokeless tobacco. She reports that she does not currently use alcohol. She reports that she does not use drugs.    Family History: family history includes Breast cancer (age of onset: 60) in her maternal grandmother; Diabetes in her father and paternal grandmother; Hypertension in her brother, father, paternal grandmother, and sister; Ovarian cancer in her maternal great-grandmother; Stroke in her maternal grandfather, maternal grandmother, mother, and paternal grandmother; Thyroid disease in her mother.       Allergies:  No Known Allergies  Medications:  Prior to Admission  "medications    Medication Sig Start Date End Date Taking? Authorizing Provider   acetaZOLAMIDE (DIAMOX) 125 MG tablet Take 1 tablet by mouth Every 12 (Twelve) Hours. 12/31/24  Yes Provider, MD Barbara   albuterol sulfate  (90 Base) MCG/ACT inhaler Inhale 2 puffs Every 4 (Four) Hours As Needed for Wheezing. 12/31/24  Yes Maddy Will APRN   busPIRone (BUSPAR) 10 MG tablet Take 0.5-1 tablets by mouth Daily As Needed (anxiety). 11/5/24  Yes Dayn Jones, DO   citalopram (CeleXA) 20 MG tablet Take 1 tablet by mouth Daily. 9/16/24  Yes Dany Jones DO   fluticasone (FLONASE) 50 MCG/ACT nasal spray USE 2 SPRAYS IN THE NOSTRIL ONCE DAILY 9/16/24  Yes Markie Hargrove APRN   loratadine (CLARITIN) 10 MG tablet Take 1 tablet by mouth Daily. 11/5/24  Yes Dany Jones DO   ondansetron ODT (ZOFRAN-ODT) 4 MG disintegrating tablet Place 1 tablet on the tongue Every 8 (Eight) Hours As Needed for Nausea or Vomiting. 12/31/24  Yes Maddy Will APRN   phentermine 15 MG capsule Take 1 capsule by mouth Every Morning. 1/17/25  Yes Dany Jnoes DO   terbinafine (lamiSIL) 250 MG tablet Take 1 tablet by mouth Daily. 1/17/25  Yes Dany Jones DO   nystatin (MYCOSTATIN) 765860 UNIT/GM cream Apply 1 Application topically to the appropriate area as directed 3 (Three) Times a Day. 9/25/24   Dany Jones DO   nystatin (MYCOSTATIN) 264001 UNIT/GM powder Apply  topically to the appropriate area as directed 4 (Four) Times a Day. 9/25/24   Dany Jones DO   rizatriptan (Maxalt) 10 MG tablet Take 1 tablet by mouth 1 (One) Time As Needed for Migraine. May repeat in 2 hours if needed  Patient not taking: Reported on 2/6/2025 12/31/24   Maddy Will APRN       Objective     Vital Signs: /71   Pulse 71   Temp 98.2 °F (36.8 °C)   Resp 20   Ht 154.9 cm (61\")   Wt 93.9 kg (207 lb)   SpO2 97%   BMI 39.11 kg/m²     Physical Exam  Vital Signs  Weight is 207. " Blood pressure and heart rate are normal.  Physical Exam  Vitals reviewed.   Constitutional:       Appearance: She is well-developed. She is obese.   HENT:      Head: Normocephalic and atraumatic.   Eyes:      Pupils: Pupils are equal, round, and reactive to light.   Neck:      Vascular: No JVD.   Cardiovascular:      Rate and Rhythm: Normal rate and regular rhythm.   Pulmonary:      Effort: Pulmonary effort is normal.   Abdominal:      General: Bowel sounds are normal.      Palpations: Abdomen is soft.   Musculoskeletal:         General: No deformity.      Cervical back: Normal range of motion and neck supple.   Lymphadenopathy:      Cervical: No cervical adenopathy.   Skin:     General: Skin is warm and dry.   Neurological:      Mental Status: She is alert and oriented to person, place, and time.   Psychiatric:         Behavior: Behavior normal.         Thought Content: Thought content normal.         Judgment: Judgment normal.      Results Reviewed:  Glucose   Date Value Ref Range Status   09/25/2024 87 65 - 99 mg/dL Final     BUN   Date Value Ref Range Status   09/25/2024 10 6 - 20 mg/dL Final     Creatinine   Date Value Ref Range Status   09/25/2024 0.73 0.57 - 1.00 mg/dL Final   10/02/2023 1.00 0.60 - 1.30 mg/dL Final     Comment:     Serial Number: [id number]:  [id number]     Sodium   Date Value Ref Range Status   09/25/2024 137 136 - 145 mmol/L Final     Potassium   Date Value Ref Range Status   09/25/2024 4.7 3.5 - 5.2 mmol/L Final     Comment:     Specimen hemolyzed.  Result may be falsely elevated.     Chloride   Date Value Ref Range Status   09/25/2024 102 98 - 107 mmol/L Final     CO2   Date Value Ref Range Status   09/25/2024 24.0 22.0 - 29.0 mmol/L Final     Calcium   Date Value Ref Range Status   09/25/2024 9.2 8.6 - 10.5 mg/dL Final     ALT (SGPT)   Date Value Ref Range Status   09/25/2024 16 1 - 33 U/L Final     Comment:     Specimen hemolyzed.  Result may  be falsely elevated.     AST (SGOT)    Date Value Ref Range Status   09/25/2024 27 1 - 32 U/L Final     Comment:     Specimen hemolyzed.  Result may be falsely elevated.     WBC   Date Value Ref Range Status   09/25/2024 7.00 3.40 - 10.80 10*3/mm3 Final   10/01/2019 4.61 3.40 - 10.80 10*3/mm3 Final     Hematocrit   Date Value Ref Range Status   09/25/2024 42.0 34.0 - 46.6 % Final     Platelets   Date Value Ref Range Status   09/25/2024 260 140 - 450 10*3/mm3 Final     Total Cholesterol   Date Value Ref Range Status   09/25/2024 203 (H) 0 - 200 mg/dL Final     Triglycerides   Date Value Ref Range Status   09/25/2024 99 0 - 150 mg/dL Final     HDL Cholesterol   Date Value Ref Range Status   09/25/2024 65 (H) 40 - 60 mg/dL Final     LDL Cholesterol    Date Value Ref Range Status   09/25/2024 120 (H) 0 - 100 mg/dL Final     LDL/HDL Ratio   Date Value Ref Range Status   09/25/2024 1.82  Final     Hemoglobin A1C   Date Value Ref Range Status   09/25/2024 5.30 4.80 - 5.60 % Final       Results  Imaging  MRI showed a lot of fluid around the ocular nerves.    Assessment / Plan     Assessment/Plan:  Diagnoses and all orders for this visit:    1. Pseudotumor cerebri (Primary)  -     CBC & Differential  -     Magnesium  -     Comprehensive Metabolic Panel    2. Anxiety and depression      Assessment & Plan  1. Weight loss.  Her blood pressure and heart rate are within normal limits. She has been advised to incorporate cardiovascular exercises into her routine, such as using the elliptical machine, to facilitate weight loss. She has been informed that Medicaid will not cover GLP injections unless she has diabetes. The dosage of phentermine will be increased to 30 mg.    2. Headaches.  She reports improvement in headaches with Diamox but experiences tingling and burning sensations. Electrolyte levels, including calcium and magnesium, will be checked to rule out any deficiencies. If electrolyte levels are abnormal, appropriate supplements will be prescribed to help  prevent these sensations and to tolerate the increase in the medicine.    3. Fungal infection.  She has been on terbinafine for four months with improvement in nail fungus. Liver function will be monitored today to ensure continued tolerance of the medication.    4. Anxiety and depression.  Her anxiety and depression are well-managed with her current medication regimen, which includes Celexa and BuSpar as needed. No changes to her medications are necessary at this time.          No follow-ups on file. unless patient needs to be seen sooner or acute issues arise.    Code Status: Full    Patient or patient representative verbalized consent for the use of Ambient Listening during the visit with  REMI Frances for chart documentation. 2/6/2025  10:50 CST    I have discussed the patient results/orders and and plan/recommendation with them at today's visit.      Signed by:    REMI Frances Date: 02/06/25

## 2025-02-07 PROBLEM — J45.909 ASTHMA DUE TO SEASONAL ALLERGIES: Status: RESOLVED | Noted: 2018-04-06 | Resolved: 2025-02-07

## 2025-02-07 LAB
ALBUMIN SERPL-MCNC: 4.5 G/DL (ref 3.9–4.9)
ALP SERPL-CCNC: 61 IU/L (ref 44–121)
ALT SERPL-CCNC: 11 IU/L (ref 0–32)
AST SERPL-CCNC: 16 IU/L (ref 0–40)
BASOPHILS # BLD AUTO: 0.1 X10E3/UL (ref 0–0.2)
BASOPHILS NFR BLD AUTO: 1 %
BILIRUB SERPL-MCNC: 0.3 MG/DL (ref 0–1.2)
BUN SERPL-MCNC: 16 MG/DL (ref 6–24)
BUN/CREAT SERPL: 16 (ref 9–23)
CALCIUM SERPL-MCNC: 9.3 MG/DL (ref 8.7–10.2)
CHLORIDE SERPL-SCNC: 105 MMOL/L (ref 96–106)
CO2 SERPL-SCNC: 20 MMOL/L (ref 20–29)
CREAT SERPL-MCNC: 1.03 MG/DL (ref 0.57–1)
EGFRCR SERPLBLD CKD-EPI 2021: 70 ML/MIN/1.73
EOSINOPHIL # BLD AUTO: 0.1 X10E3/UL (ref 0–0.4)
EOSINOPHIL NFR BLD AUTO: 2 %
ERYTHROCYTE [DISTWIDTH] IN BLOOD BY AUTOMATED COUNT: 13 % (ref 11.7–15.4)
GLOBULIN SER CALC-MCNC: 2.4 G/DL (ref 1.5–4.5)
GLUCOSE SERPL-MCNC: 79 MG/DL (ref 70–99)
HCT VFR BLD AUTO: 43 % (ref 34–46.6)
HGB BLD-MCNC: 13.9 G/DL (ref 11.1–15.9)
IMM GRANULOCYTES # BLD AUTO: 0 X10E3/UL (ref 0–0.1)
IMM GRANULOCYTES NFR BLD AUTO: 0 %
LYMPHOCYTES # BLD AUTO: 1.6 X10E3/UL (ref 0.7–3.1)
LYMPHOCYTES NFR BLD AUTO: 29 %
MAGNESIUM SERPL-MCNC: 2.2 MG/DL (ref 1.6–2.3)
MCH RBC QN AUTO: 29.6 PG (ref 26.6–33)
MCHC RBC AUTO-ENTMCNC: 32.3 G/DL (ref 31.5–35.7)
MCV RBC AUTO: 92 FL (ref 79–97)
MONOCYTES # BLD AUTO: 0.5 X10E3/UL (ref 0.1–0.9)
MONOCYTES NFR BLD AUTO: 9 %
NEUTROPHILS # BLD AUTO: 3.2 X10E3/UL (ref 1.4–7)
NEUTROPHILS NFR BLD AUTO: 59 %
PLATELET # BLD AUTO: 283 X10E3/UL (ref 150–450)
POTASSIUM SERPL-SCNC: 4.3 MMOL/L (ref 3.5–5.2)
PROT SERPL-MCNC: 6.9 G/DL (ref 6–8.5)
RBC # BLD AUTO: 4.7 X10E6/UL (ref 3.77–5.28)
SODIUM SERPL-SCNC: 138 MMOL/L (ref 134–144)
WBC # BLD AUTO: 5.4 X10E3/UL (ref 3.4–10.8)

## 2025-02-07 RX ORDER — ONDANSETRON 4 MG/1
4 TABLET, FILM COATED ORAL EVERY 8 HOURS PRN
COMMUNITY
Start: 2024-12-02

## 2025-02-07 NOTE — PATIENT INSTRUCTIONS
Leticia    Thank you for attending your virtual visit today with [facility]. It was a pleasure caring for you today. The best way to contact me for medication refills or questions about your care is through [employer]. Otherwise, you can contact the office by phone: [phone].    Mitali Morton, COBY-CNP

## 2025-02-12 ENCOUNTER — APPOINTMENT (OUTPATIENT)
Dept: NEUROLOGY | Facility: HOSPITAL | Age: 40
End: 2025-02-12
Payer: COMMERCIAL

## 2025-03-04 DIAGNOSIS — E66.812 CLASS 2 OBESITY DUE TO EXCESS CALORIES WITHOUT SERIOUS COMORBIDITY WITH BODY MASS INDEX (BMI) OF 37.0 TO 37.9 IN ADULT: Primary | ICD-10-CM

## 2025-03-04 DIAGNOSIS — E66.09 CLASS 2 OBESITY DUE TO EXCESS CALORIES WITHOUT SERIOUS COMORBIDITY WITH BODY MASS INDEX (BMI) OF 37.0 TO 37.9 IN ADULT: Primary | ICD-10-CM

## 2025-03-04 RX ORDER — PHENTERMINE HYDROCHLORIDE 37.5 MG/1
37.5 CAPSULE ORAL EVERY MORNING
Qty: 30 CAPSULE | Refills: 0 | Status: SHIPPED | OUTPATIENT
Start: 2025-03-04

## 2025-03-11 ENCOUNTER — APPOINTMENT (OUTPATIENT)
Dept: NEUROLOGY | Facility: CLINIC | Age: 40
End: 2025-03-11
Payer: COMMERCIAL

## 2025-03-11 DIAGNOSIS — G93.2 IIH (IDIOPATHIC INTRACRANIAL HYPERTENSION): Primary | ICD-10-CM

## 2025-03-11 DIAGNOSIS — H53.9 VISUAL DISTURBANCE: ICD-10-CM

## 2025-03-11 DIAGNOSIS — R11.0 NAUSEA: ICD-10-CM

## 2025-03-11 DIAGNOSIS — G43.E01 CHRONIC MIGRAINE WITH AURA AND WITH STATUS MIGRAINOSUS, NOT INTRACTABLE: ICD-10-CM

## 2025-03-11 PROCEDURE — 99214 OFFICE O/P EST MOD 30 MIN: CPT

## 2025-03-11 NOTE — PROGRESS NOTES
"Chief Complaint   Patient presents with    Headache     Follow-up/ IIH     Virtual or Telephone Consent  An interactive audio and video telecommunication system which permits real time communications between the patient (at the originating site) and provider (at the distant site) was utilized to provide this telehealth service.     Verbal consent was requested and obtained from Leticia Velasquez on this date, 03/11/25 for a telehealth visit and the patient's location was confirmed at the time of the visit.    Subjective   HPI  Leticia Velasquez is a 40 y.o. year old female who presents for a virtual follow-up of IIH (idiopathic intracranial hypertension) [G93.2], chronic migraine with aura, visual disturbance, and nausea. PMH significant for allergic rhinitis, asthma, anxiety/depression, headaches, PCOS, weight gain, and repeat candida infections. She is a 10 pack-year smoker, quit 12 years ago.   *Switched to a new PCP- Mitali Ly KT/ [facility].    Leticia  states that she is experiencing a HA in the morning, over right ear 3 days per week. (Previous visit, had about 4-5 migraines, one severe, lasting 3 days).  Also states she is \"smelling weird smells\"and experiencing twitching of right eye, HA over the top of the right eye- pressure; can't even touch the outside. Pain level 7/10; intermittent, but increasing.  Started acetazolamide on 1/17/25; (side effect of nausea in the morning; tingling in the fingertips/ dropping paint brushes).  125 mg at night- 9 PM. If she takes it earlier, the tingling is still present  Triggers include  barometric pressure, wind, bending forward/ backwards. Aura sees stars in visual field prior to the onset of headaches.   Daily symptoms of confusion upon awakening, cold sweats/ nausea followed by a headache, sensitive ears when outside in the wind. Causes pain behind the right eye.   The headaches are usually supraorbital \"behind the bone\" always on the right side intense " pressure. No excess lacrimation or rhinorrhea associated with it. She also endorses heaviness in the back of the neck/ sore muscle.   Headaches are worsened with exertion. Bending over and putting head back makes it worse- not instantly but within a few hours.   Associated nausea, photophobia, and phonophobia, cognitive fog, fatigue.   Treats with Rizatriptan- has only taken it once and did not feel a difference. Took it about the first of January. States she just went home and went to bed and slept for about 8 hours. Unsure if the HA made her sleep or the medication.   Work attendance or other daily activities affected: still difficult to complete work. (She is a ); bilateral tinnitus with painting- will experience ringing in the ears if she leans to the side.    The patient denies the presence of any associated double vision, speech problems, confusion, facial or problems with coordination. Denies other neurological history of seizures, stroke, or TIA.    Current/ past HA treatments:  Preventive:  Acetazolamide   No history of Topamax use, no hx of kidney stones     Rescue:  rizatriptan  No steroid burst  Ibuprofen  Tylenol  heat      Current Outpatient Medications:     acetaZOLAMIDE (Diamox) 125 mg tablet, Take 1 tablet (125 mg) by mouth 3 times a day. Start with 1 daily at night x 1 week, then twice a day x 1 week, then 3 times a day., Disp: 90 tablet, Rfl: 0    busPIRone (Buspar) 10 mg tablet, Take 0.5-1 tablets (5-10 mg) by mouth if needed., Disp: , Rfl:     citalopram (CeleXA) 20 mg tablet, Take 1 tablet (20 mg) by mouth once daily., Disp: , Rfl:     loratadine (Claritin) 10 mg tablet, Take 1 tablet (10 mg) by mouth once daily., Disp: , Rfl:     ondansetron (Zofran) 4 mg tablet, Take 1 tablet (4 mg) by mouth every 8 hours if needed for nausea or vomiting for up to 90 doses., Disp: 30 tablet, Rfl: 2    rizatriptan (Maxalt) 10 mg tablet, Take 1 tablet (10 mg) by mouth 1 time if needed for migraine (May  repeat in 2 hours if unresolved. Do not exceed 30 mg in 24 hours.) for up to 27 doses., Disp: 9 tablet, Rfl: 2    terbinafine (LamISIL) 250 mg tablet, Take 1 tablet (250 mg) by mouth once daily., Disp: , Rfl:     No Known Allergies    ROS  As noted in HPI, otherwise all other systems have been reviewed are negative for complaint.     Objective   General Appearance: Leticia is well-developed, well-nourished, 40 y.o. year old female, in no acute distress. Makes good eye contact, is alert, interactive, and cooperative. Demonstrates recent & remote memory recall. Subjective information consistent with objective assessment. *Assessment limited due to virtual visit.    Neurological Exam  Cranial Nerves  CN III, IV, VI: Extraocular movements intact bilaterally. Normal lids and orbits bilaterally.  CN VII: Full and symmetric facial movement.  CN VIII: Hearing is normal.    RECORDS REVIEW:  Previous records (provider notes, laboratory reports, and imaging studies were reviewed and summarized).  From initial visit: From [place]. Occipital pain and ears- more the right side. MRI done 4/25/24. [facility]) CT Veniogram also done 4/11/24. EEG done 4/11/24. CT Sinus w/o contrast 10/2/23. Lumbar Puncture in April. Weight gain in past year.     She states that she had sinus pressure/ headaches for about a year prior to IIH diagnosis in April 2024. An LP was performed 5/13/2024, opening pressure of 23 cm (removal of 25 mL CSF), closing pressure 13 cm. She endorses improvement of symptoms for 3 to 4 months following the LP. She began to have similar symptoms and at a follow-up visit, it was suggested to complete therapeutic LPs when indicated.   Assessment & Plan  IIH (idiopathic intracranial hypertension)         Chronic migraine with aura and with status migrainosus, not intractable         Visual disturbance         Nausea              ASSESSMENT/PLAN:  Increase Diamox to twice a day- note  what your symptoms are, what side effects you are experiencing, and how they impact you. (Backup plan will be to switch to topiramate)  Please have PCP Check B 12, Vitamin D-25  Ask about adding semaglutide (Ozempic or Wegovy) or trezepetide (Zep Bound)  Keep upcoming appointment with Dr. Champion  I will send visit notes to your new PCP for continuity of care  Follow up in 3 months    I personally spent 33 minutes today, exclusive of procedures, providing care for this patient, including preparation, face to face time, documentation and other services such as review of medical records, diagnostic result, patient education, counseling, coordination of care as specified in the encounter.     Mitali Morton, APRN-CNP

## 2025-03-11 NOTE — LETTER
"March 19, 2025     Mitali Richardson  [address]  [place] [address]    Patient: Leticia Velasquez   YOB: 1985   Date of Visit: 3/11/2025       Dear Dr. Mitali Richardson:    I recently had a follow-up virtual visit with our mutual patient, Leticia. She stated that she recently switched to you for primary care. Her IIH is currently being managed by myself and the neurosurgery team at [facility].  Please reach out if there is anything we can do to help coordinate care for her.       Sincerely,     Mitali Morton, APRN-CNP      CC: No Recipients  ______________________________________________________________________________________    Chief Complaint   Patient presents with   • Headache     Follow-up/ IIH     Virtual or Telephone Consent  An interactive audio and video telecommunication system which permits real time communications between the patient (at the originating site) and provider (at the distant site) was utilized to provide this telehealth service.     Verbal consent was requested and obtained from Leticia Velasquez on this date, 03/11/25 for a telehealth visit and the patient's location was confirmed at the time of the visit.    Subjective  HPI  Leticia Velasquez is a 40 y.o. year old female who presents for a virtual follow-up of IIH (idiopathic intracranial hypertension) [G93.2], chronic migraine with aura, visual disturbance, and nausea. PMH significant for allergic rhinitis, asthma, anxiety/depression, headaches, PCOS, weight gain, and repeat candida infections. She is a 10 pack-year smoker, quit 12 years ago.   *Switched to a new PCP- Mitali Ly / [facility].    Leticia  states that she is experiencing a HA in the morning, over right ear 3 days per week. (Previous visit, had about 4-5 migraines, one severe, lasting 3 days).  Also states she is \"smelling weird smells\"and experiencing twitching of right eye, HA over the top of the right eye- pressure; " "can't even touch the outside. Pain level 7/10; intermittent, but increasing.  Started acetazolamide on 1/17/25; (side effect of nausea in the morning; tingling in the fingertips/ dropping paint brushes).  125 mg at night- 9 PM. If she takes it earlier, the tingling is still present  Triggers include  barometric pressure, wind, bending forward/ backwards. Aura sees stars in visual field prior to the onset of headaches.   Daily symptoms of confusion upon awakening, cold sweats/ nausea followed by a headache, sensitive ears when outside in the wind. Causes pain behind the right eye.   The headaches are usually supraorbital \"behind the bone\" always on the right side intense pressure. No excess lacrimation or rhinorrhea associated with it. She also endorses heaviness in the back of the neck/ sore muscle.   Headaches are worsened with exertion. Bending over and putting head back makes it worse- not instantly but within a few hours.   Associated nausea, photophobia, and phonophobia, cognitive fog, fatigue.   Treats with Rizatriptan- has only taken it once and did not feel a difference. Took it about the first of January. States she just went home and went to bed and slept for about 8 hours. Unsure if the HA made her sleep or the medication.   Work attendance or other daily activities affected: still difficult to complete work. (She is a ); bilateral tinnitus with painting- will experience ringing in the ears if she leans to the side.    The patient denies the presence of any associated double vision, speech problems, confusion, facial or problems with coordination. Denies other neurological history of seizures, stroke, or TIA.    Current/ past HA treatments:  Preventive:  Acetazolamide   No history of Topamax use, no hx of kidney stones     Rescue:  rizatriptan  No steroid burst  Ibuprofen  Tylenol  heat      Current Outpatient Medications:   •  acetaZOLAMIDE (Diamox) 125 mg tablet, Take 1 tablet (125 mg) by mouth 3 " times a day. Start with 1 daily at night x 1 week, then twice a day x 1 week, then 3 times a day., Disp: 90 tablet, Rfl: 0  •  busPIRone (Buspar) 10 mg tablet, Take 0.5-1 tablets (5-10 mg) by mouth if needed., Disp: , Rfl:   •  citalopram (CeleXA) 20 mg tablet, Take 1 tablet (20 mg) by mouth once daily., Disp: , Rfl:   •  loratadine (Claritin) 10 mg tablet, Take 1 tablet (10 mg) by mouth once daily., Disp: , Rfl:   •  ondansetron (Zofran) 4 mg tablet, Take 1 tablet (4 mg) by mouth every 8 hours if needed for nausea or vomiting for up to 90 doses., Disp: 30 tablet, Rfl: 2  •  rizatriptan (Maxalt) 10 mg tablet, Take 1 tablet (10 mg) by mouth 1 time if needed for migraine (May repeat in 2 hours if unresolved. Do not exceed 30 mg in 24 hours.) for up to 27 doses., Disp: 9 tablet, Rfl: 2  •  terbinafine (LamISIL) 250 mg tablet, Take 1 tablet (250 mg) by mouth once daily., Disp: , Rfl:     No Known Allergies    ROS  As noted in HPI, otherwise all other systems have been reviewed are negative for complaint.     Objective  General Appearance: Leticia is well-developed, well-nourished, 40 y.o. year old female, in no acute distress. Makes good eye contact, is alert, interactive, and cooperative. Demonstrates recent & remote memory recall. Subjective information consistent with objective assessment. *Assessment limited due to virtual visit.    Neurological Exam  Cranial Nerves  CN III, IV, VI: Extraocular movements intact bilaterally. Normal lids and orbits bilaterally.  CN VII: Full and symmetric facial movement.  CN VIII: Hearing is normal.    RECORDS REVIEW:  Previous records (provider notes, laboratory reports, and imaging studies were reviewed and summarized).  From initial visit: From [place]. Occipital pain and ears- more the right side. MRI done 4/25/24. [facility] in [facility]ai[facility]) CT Veniogram also done 4/11/24. EEG done 4/11/24. CT Sinus w/o contrast 10/2/23. Lumbar Puncture in April. Weight  gain in past year.     She states that she had sinus pressure/ headaches for about a year prior to IIH diagnosis in April 2024. An LP was performed 5/13/2024, opening pressure of 23 cm (removal of 25 mL CSF), closing pressure 13 cm. She endorses improvement of symptoms for 3 to 4 months following the LP. She began to have similar symptoms and at a follow-up visit, it was suggested to complete therapeutic LPs when indicated.   Assessment & Plan  IIH (idiopathic intracranial hypertension)         Chronic migraine with aura and with status migrainosus, not intractable         Visual disturbance         Nausea              ASSESSMENT/PLAN:  Increase Diamox to twice a day- note what your symptoms are, what side effects you are experiencing, and how they impact you. (Backup plan will be to switch to topiramate)  Please have PCP Check B 12, Vitamin D-25  Ask about adding semaglutide (Ozempic or Wegovy) or trezepetide (Zep Bound)  Keep upcoming appointment with Dr. Champion  I will send visit notes to your new PCP for continuity of care  Follow up in 3 months    I personally spent 33 minutes today, exclusive of procedures, providing care for this patient, including preparation, face to face time, documentation and other services such as review of medical records, diagnostic result, patient education, counseling, coordination of care as specified in the encounter.     Mitali Morton, COBY-CNP

## 2025-03-19 NOTE — PATIENT INSTRUCTIONS
Leticia    Thank you for attending your virtual visit today with [facility]. It was a pleasure caring for you today. The best way to contact me for medication refills or questions about your care is through [employer]. Otherwise, you can contact the office by phone: [phone].    Mitali Morton, APRN-CNP    PLAN:  Increase Diamox to twice a day- note what your symptoms are, what side effects you are experiencing, and how they impact you. (Backup plan will be to switch to topiramate)  Please have PCP Check B 12, Vitamin D-25  Ask about adding semaglutide (Ozempic or Wegovy) or trezepetide (Zep Bound)  Keep upcoming appointment with Dr. Champion  I will send visit notes to your new PCP for continuity of care  Follow up in 3 months

## 2025-03-21 ENCOUNTER — OFFICE VISIT (OUTPATIENT)
Dept: INTERNAL MEDICINE | Facility: CLINIC | Age: 40
End: 2025-03-21
Payer: MEDICAID

## 2025-03-21 VITALS
BODY MASS INDEX: 37.46 KG/M2 | SYSTOLIC BLOOD PRESSURE: 112 MMHG | HEART RATE: 53 BPM | WEIGHT: 198.4 LBS | RESPIRATION RATE: 18 BRPM | HEIGHT: 61 IN | DIASTOLIC BLOOD PRESSURE: 70 MMHG | OXYGEN SATURATION: 100 %

## 2025-03-21 DIAGNOSIS — E66.812 CLASS 2 OBESITY DUE TO EXCESS CALORIES WITHOUT SERIOUS COMORBIDITY WITH BODY MASS INDEX (BMI) OF 37.0 TO 37.9 IN ADULT: Primary | ICD-10-CM

## 2025-03-21 DIAGNOSIS — E66.09 CLASS 2 OBESITY DUE TO EXCESS CALORIES WITHOUT SERIOUS COMORBIDITY WITH BODY MASS INDEX (BMI) OF 37.0 TO 37.9 IN ADULT: Primary | ICD-10-CM

## 2025-03-21 DIAGNOSIS — G93.2 PSEUDOTUMOR CEREBRI: ICD-10-CM

## 2025-03-21 NOTE — PROGRESS NOTES
Subjective     Chief Complaint   Patient presents with    Pseudotumor cerebri     Follow up.        History of Present Illness  The patient presents for evaluation of weight management, pseudotumor cerebri, and suspected candida overgrowth.    She has been responding well to the increased dosage of phentermine, with no reported palpitations. She has experienced a weight loss of approximately 10 pounds since her last visit, dropping from 207 to 198 pounds. She has been incorporating more protein into her diet, which she believes is beneficial. She has also ordered an electrolyte mix from [employer], which contains magnesium and seaweed.    She had a consultation with her pseudotumor provider recently, who recommended monitoring her B12 and D levels. Her Diamox dosage was increased to every 12 hours instead of every 24 hours due to frequent headaches. She reports that this week has been challenging, but she anticipates improvement as she experienced similar symptoms when she initially started the medication. She describes a burning sensation in her hands and feet and has been experiencing frequent headaches. She believes that changes in barometric pressure may be contributing to her headaches. She has also noticed a recurrence of unusual smells and eye twitching, symptoms she experienced prior to her lumbar puncture. A therapeutic lumbar puncture has been suggested as a potential treatment option. She has an upcoming appointment with an ophthalmologist on 04/07/2025 for an eye examination. She has been consuming bananas to maintain her potassium levels and has ordered an electrolyte mix from [employer] containing magnesium and seaweed. She has also increased her protein intake, which she believes is beneficial.    She has been dealing with yeast infections under her breasts and elsewhere for about 4 years. She was taking a natural supplement called Candida Gone, which helped a little bit, but she still  struggles with it all the time.    MEDICATIONS  Current: Phentermine, Diamox        Otherwise complete ROS reviewed and negative except as mentioned in the HPI.    Past Medical History:   Past Medical History:   Diagnosis Date    Abnormal Pap smear of cervix     Anxiety     Asthma     childhood    Asthma due to seasonal allergies 04/06/2018    Cervical dysplasia     COVID-19 virus infection 07/2021    again in 1/22    Depression     Headache 2024    Migraine     Obesity     Polycystic ovary syndrome     Seasonal allergies     Varicella 1990     Past Surgical History:  Past Surgical History:   Procedure Laterality Date    CERVICAL BIOPSY  W/ LOOP ELECTRODE EXCISION      COLPOSCOPY      LEEP  2015    LUMBAR PUNCTURE      for migraine    TONSILLECTOMY  2004    WISDOM TOOTH EXTRACTION  2004     Social History:  reports that she quit smoking about 13 years ago. Her smoking use included cigarettes. She started smoking about 23 years ago. She has a 10 pack-year smoking history. She has been exposed to tobacco smoke. She has never used smokeless tobacco. She reports that she does not currently use alcohol. She reports that she does not use drugs.    Family History: family history includes Breast cancer (age of onset: 60) in her maternal grandmother; Diabetes in her father and paternal grandmother; Hypertension in her brother, father, paternal grandmother, and sister; Ovarian cancer in her maternal great-grandmother; Stroke in her maternal grandfather, maternal grandmother, mother, and paternal grandmother; Thyroid disease in her mother.       Allergies:  No Known Allergies  Medications:  Prior to Admission medications    Medication Sig Start Date End Date Taking? Authorizing Provider   acetaZOLAMIDE (DIAMOX) 125 MG tablet Take 1 tablet by mouth Every 12 (Twelve) Hours. 12/31/24  Yes Provider, MD Barbara   albuterol sulfate  (90 Base) MCG/ACT inhaler Inhale 2 puffs Every 4 (Four) Hours As Needed for Wheezing.  "12/31/24  Yes Maddy Will APRN   busPIRone (BUSPAR) 10 MG tablet Take 0.5-1 tablets by mouth Daily As Needed (anxiety). 11/5/24  Yes Dany Jones DO   citalopram (CeleXA) 20 MG tablet Take 1 tablet by mouth Daily. 9/16/24  Yes Dany Jones DO   fluticasone (FLONASE) 50 MCG/ACT nasal spray USE 2 SPRAYS IN THE NOSTRIL ONCE DAILY 9/16/24  Yes Markie Hargrove APRN   loratadine (CLARITIN) 10 MG tablet Take 1 tablet by mouth Daily. 11/5/24  Yes Dany Jones DO   nystatin (MYCOSTATIN) 383342 UNIT/GM cream Apply 1 Application topically to the appropriate area as directed 3 (Three) Times a Day. 9/25/24  Yes Dany Jones DO   nystatin (MYCOSTATIN) 696391 UNIT/GM powder Apply  topically to the appropriate area as directed 4 (Four) Times a Day. 9/25/24  Yes Dany Jones DO   ondansetron (ZOFRAN) 4 MG tablet Take 1 tablet by mouth Every 8 (Eight) Hours As Needed. 12/2/24  Yes Provider, MD Barbara   phentermine 15 MG capsule Take 1 capsule by mouth Every Morning. 1/17/25  Yes Dany Jones DO   phentermine 37.5 MG capsule Take 1 capsule by mouth Every Morning. 3/4/25  Yes Neema Vera APRN   terbinafine (lamiSIL) 250 MG tablet Take 1 tablet by mouth Daily. 1/17/25  Yes Dany Jones DO       Objective     Vital Signs: /70 (BP Location: Right arm, Patient Position: Sitting, Cuff Size: Adult)   Pulse 53   Resp 18   Ht 154.9 cm (61\")   Wt 90 kg (198 lb 6.4 oz)   SpO2 100%   BMI 37.49 kg/m²     Physical Exam  Vital Signs  Heart rate is 53. Weight is 198.  Physical Exam  Vitals reviewed.   Constitutional:       Appearance: She is well-developed.   HENT:      Head: Normocephalic and atraumatic.   Eyes:      Pupils: Pupils are equal, round, and reactive to light.   Neck:      Vascular: No JVD.   Cardiovascular:      Rate and Rhythm: Normal rate and regular rhythm.   Pulmonary:      Effort: Pulmonary effort is normal.   Abdominal:      " General: Bowel sounds are normal.      Palpations: Abdomen is soft.   Musculoskeletal:         General: No deformity.      Cervical back: Normal range of motion and neck supple.   Lymphadenopathy:      Cervical: No cervical adenopathy.   Skin:     General: Skin is warm and dry.   Neurological:      Mental Status: She is alert and oriented to person, place, and time.   Psychiatric:         Behavior: Behavior normal.         Thought Content: Thought content normal.         Judgment: Judgment normal.       Results Reviewed:  Glucose   Date Value Ref Range Status   02/06/2025 79 70 - 99 mg/dL Final   09/25/2024 87 65 - 99 mg/dL Final     BUN   Date Value Ref Range Status   02/06/2025 16 6 - 24 mg/dL Final   09/25/2024 10 6 - 20 mg/dL Final     Creatinine   Date Value Ref Range Status   02/06/2025 1.03 (H) 0.57 - 1.00 mg/dL Final   09/25/2024 0.73 0.57 - 1.00 mg/dL Final   10/02/2023 1.00 0.60 - 1.30 mg/dL Final     Comment:     Serial Number: [id number]:  [id number]     Sodium   Date Value Ref Range Status   02/06/2025 138 134 - 144 mmol/L Final   09/25/2024 137 136 - 145 mmol/L Final     Potassium   Date Value Ref Range Status   02/06/2025 4.3 3.5 - 5.2 mmol/L Final   09/25/2024 4.7 3.5 - 5.2 mmol/L Final     Comment:     Specimen hemolyzed.  Result may be falsely elevated.     Chloride   Date Value Ref Range Status   02/06/2025 105 96 - 106 mmol/L Final   09/25/2024 102 98 - 107 mmol/L Final     CO2   Date Value Ref Range Status   09/25/2024 24.0 22.0 - 29.0 mmol/L Final     Total CO2   Date Value Ref Range Status   02/06/2025 20 20 - 29 mmol/L Final     Calcium   Date Value Ref Range Status   02/06/2025 9.3 8.7 - 10.2 mg/dL Final   09/25/2024 9.2 8.6 - 10.5 mg/dL Final     ALT (SGPT)   Date Value Ref Range Status   02/06/2025 11 0 - 32 IU/L Final   09/25/2024 16 1 - 33 U/L Final     Comment:     Specimen hemolyzed.  Result may  be falsely elevated.     AST (SGOT)   Date Value Ref Range Status   02/06/2025 16 0 - 40  IU/L Final   09/25/2024 27 1 - 32 U/L Final     Comment:     Specimen hemolyzed.  Result may be falsely elevated.     WBC   Date Value Ref Range Status   02/06/2025 5.4 3.4 - 10.8 x10E3/uL Final     Hematocrit   Date Value Ref Range Status   02/06/2025 43.0 34.0 - 46.6 % Final   09/25/2024 42.0 34.0 - 46.6 % Final     Platelets   Date Value Ref Range Status   02/06/2025 283 150 - 450 x10E3/uL Final   09/25/2024 260 140 - 450 10*3/mm3 Final     Total Cholesterol   Date Value Ref Range Status   09/25/2024 203 (H) 0 - 200 mg/dL Final     Triglycerides   Date Value Ref Range Status   09/25/2024 99 0 - 150 mg/dL Final     HDL Cholesterol   Date Value Ref Range Status   09/25/2024 65 (H) 40 - 60 mg/dL Final     LDL Cholesterol    Date Value Ref Range Status   09/25/2024 120 (H) 0 - 100 mg/dL Final     LDL/HDL Ratio   Date Value Ref Range Status   09/25/2024 1.82  Final     Hemoglobin A1C   Date Value Ref Range Status   09/25/2024 5.30 4.80 - 5.60 % Final       Results        Assessment / Plan     Assessment/Plan:  Diagnoses and all orders for this visit:    1. Class 2 obesity due to excess calories without serious comorbidity with body mass index (BMI) of 37.0 to 37.9 in adult (Primary)    2. Pseudotumor cerebri  -     Vitamin D,25-Hydroxy  -     Vitamin B12  -     Comprehensive Metabolic Panel      Assessment & Plan  1. Weight management.  She has experienced a significant weight loss of 10 pounds, reducing from 207 to 198 pounds. Her heart rate is currently at 53 beats per minute. She is advised to incorporate anti-inflammatory foods such as berries and melons into her diet while avoiding high-carb fruits like bananas, grapes, and pineapples. A comprehensive metabolic panel (CMP) will be conducted today, with a repeat CMP scheduled for 31st to monitor her kidney function due to the potential stress caused by Diamox. If any issues arise, a potassium supplement may be considered.    2. Pseudotumor cerebri.  She reports  that her symptoms, including headaches, burning sensations in her hands and feet, and visual disturbances, have been rough this week but are expected to improve. The Diamox dosage has been increased to every 12 hours. A therapeutic lumbar puncture may be considered if symptoms persist. An appointment with an ophthalmologist is scheduled for April 7 to have her eyes checked. Kidney function will be monitored due to the increased Diamox dosage.    3. Suspected candida overgrowth.  She has been experiencing yeast-like symptoms under her breasts and elsewhere for about four years. She has been taking a natural supplement called Candida Gone, which provides some relief but does not fully resolve the issue. Consultation with an infectious disease specialist will be sought to determine the appropriate diagnostic test, potentially a urine or stool specimen.      Return for keep scheduled appt. . unless patient needs to be seen sooner or acute issues arise.    Code Status: Full  Patient or patient representative verbalized consent for the use of Ambient Listening during the visit with  REMI Frances for chart documentation. 3/21/2025  10:18 CDT  I have discussed the patient results/orders and and plan/recommendation with them at today's visit.      Signed by:    REMI Frances Date: 03/21/25

## 2025-03-22 LAB
25(OH)D3+25(OH)D2 SERPL-MCNC: 22.5 NG/ML (ref 30–100)
ALBUMIN SERPL-MCNC: 4.7 G/DL (ref 3.9–4.9)
ALP SERPL-CCNC: 76 IU/L (ref 44–121)
ALT SERPL-CCNC: 10 IU/L (ref 0–32)
AST SERPL-CCNC: 16 IU/L (ref 0–40)
BILIRUB SERPL-MCNC: 0.3 MG/DL (ref 0–1.2)
BUN SERPL-MCNC: 14 MG/DL (ref 6–24)
BUN/CREAT SERPL: 13 (ref 9–23)
CALCIUM SERPL-MCNC: 9.6 MG/DL (ref 8.7–10.2)
CHLORIDE SERPL-SCNC: 105 MMOL/L (ref 96–106)
CO2 SERPL-SCNC: 17 MMOL/L (ref 20–29)
CREAT SERPL-MCNC: 1.06 MG/DL (ref 0.57–1)
EGFRCR SERPLBLD CKD-EPI 2021: 67 ML/MIN/1.73
GLOBULIN SER CALC-MCNC: 2.8 G/DL (ref 1.5–4.5)
GLUCOSE SERPL-MCNC: 93 MG/DL (ref 70–99)
POTASSIUM SERPL-SCNC: 4.3 MMOL/L (ref 3.5–5.2)
PROT SERPL-MCNC: 7.5 G/DL (ref 6–8.5)
SODIUM SERPL-SCNC: 138 MMOL/L (ref 134–144)
VIT B12 SERPL-MCNC: 672 PG/ML (ref 232–1245)

## 2025-03-24 DIAGNOSIS — E55.9 VITAMIN D DEFICIENCY: Primary | ICD-10-CM

## 2025-03-24 RX ORDER — ERGOCALCIFEROL 1.25 MG/1
50000 CAPSULE, LIQUID FILLED ORAL WEEKLY
Qty: 5 CAPSULE | Refills: 6 | Status: SHIPPED | OUTPATIENT
Start: 2025-03-24

## 2025-04-07 ENCOUNTER — APPOINTMENT (OUTPATIENT)
Dept: OPHTHALMOLOGY | Facility: CLINIC | Age: 40
End: 2025-04-07
Payer: COMMERCIAL

## 2025-04-07 DIAGNOSIS — G93.2 IDIOPATHIC INTRACRANIAL HYPERTENSION: Primary | ICD-10-CM

## 2025-04-07 DIAGNOSIS — H53.9 VISUAL DISTURBANCE: ICD-10-CM

## 2025-04-07 DIAGNOSIS — G93.2 IIH (IDIOPATHIC INTRACRANIAL HYPERTENSION): ICD-10-CM

## 2025-04-07 PROCEDURE — 92133 CPTRZD OPH DX IMG PST SGM ON: CPT | Performed by: PSYCHIATRY & NEUROLOGY

## 2025-04-07 PROCEDURE — 92083 EXTENDED VISUAL FIELD XM: CPT | Performed by: PSYCHIATRY & NEUROLOGY

## 2025-04-07 PROCEDURE — 99205 OFFICE O/P NEW HI 60 MIN: CPT | Performed by: PSYCHIATRY & NEUROLOGY

## 2025-04-07 RX ORDER — TOPIRAMATE 25 MG/1
50 TABLET ORAL 2 TIMES DAILY
Qty: 120 TABLET | Refills: 3 | Status: SHIPPED | OUTPATIENT
Start: 2025-04-07 | End: 2025-08-05

## 2025-04-07 ASSESSMENT — CONF VISUAL FIELD
OD_INFERIOR_NASAL_RESTRICTION: 0
OS_NORMAL: 1
OD_INFERIOR_TEMPORAL_RESTRICTION: 0
OS_SUPERIOR_TEMPORAL_RESTRICTION: 0
OS_SUPERIOR_NASAL_RESTRICTION: 0
OD_NORMAL: 1
OD_SUPERIOR_TEMPORAL_RESTRICTION: 0
OD_SUPERIOR_NASAL_RESTRICTION: 0
OS_INFERIOR_NASAL_RESTRICTION: 0
OS_INFERIOR_TEMPORAL_RESTRICTION: 0

## 2025-04-07 ASSESSMENT — VISUAL ACUITY
OD_SC: 20/20
METHOD: SNELLEN - LINEAR
OS_SC: 20/20

## 2025-04-07 ASSESSMENT — ENCOUNTER SYMPTOMS
NEUROLOGICAL NEGATIVE: 0
CARDIOVASCULAR NEGATIVE: 0
CONSTITUTIONAL NEGATIVE: 0
EYES NEGATIVE: 1
PSYCHIATRIC NEGATIVE: 0
ENDOCRINE NEGATIVE: 0
HEMATOLOGIC/LYMPHATIC NEGATIVE: 0
ALLERGIC/IMMUNOLOGIC NEGATIVE: 0
MUSCULOSKELETAL NEGATIVE: 0
GASTROINTESTINAL NEGATIVE: 0
RESPIRATORY NEGATIVE: 0

## 2025-04-07 ASSESSMENT — TONOMETRY
OS_IOP_MMHG: 17
OD_IOP_MMHG: 17
IOP_METHOD: GOLDMANN APPLANATION

## 2025-04-07 ASSESSMENT — CUP TO DISC RATIO
OS_RATIO: 0.2
OD_RATIO: 0.2

## 2025-04-07 ASSESSMENT — EXTERNAL EXAM - LEFT EYE: OS_EXAM: NORMAL

## 2025-04-07 ASSESSMENT — SLIT LAMP EXAM - LIDS
COMMENTS: NORMAL
COMMENTS: NORMAL

## 2025-04-07 ASSESSMENT — EXTERNAL EXAM - RIGHT EYE: OD_EXAM: NORMAL

## 2025-04-07 NOTE — LETTER
April 7, 2025     Mitali Morton, APRN-CNP  [address]  [facility] [address]    Patient: Leticia Velasquez   YOB: 1985   Date of Visit: 4/7/2025     Dear Dr. Mitali Morton, APRN-CNP:    I am writing to share my findings regarding our shared patient Leticia Velasquez from her visit with me on 4/7/2025.    HPI    Patient states that she has intracranial hypertension and fluid on her ocular nerves.    -- Attending history below --  This 40 year-old woman with a history of idiopathic intracranial hypertension, obesity, chronic migraine presents for evaluation of idiopathic intracranial hypertension.    She has pain in her right eye and ear. She would have bad headaches if bending over. She had evaluation 4/2024 diagnostic of idiopathic intracranial hypertension. She had few symptoms after the lumbar puncture then, so she did not have further treatment.    The patient reports the following regarding associated symptoms: headaches: yes, happening about once a week now, improved from before, pulsatile tinnitus: yes, for about 2 years intermittently provoked with certain head positions, transient visual obscurations: no, & diplopia: no.     She started acetazolamide January 2025, currently taking 125 mg PO BID. She does have problems with paresthesias. She has nausea in the mornings.    She reports that her local eye doctor did not note edema of her optic nerves in the past.    She last had a visit with neurology NP Mitali Morton 3/11/2025 with increased acetazolamide and consideration of a GLP1 drug.    Last edited by Ramone Champion MD PhD on 4/7/2025  3:03 PM.        Diagnoses    Leticia was seen today for intracranial hypertension.  Diagnoses and all orders for this visit:  Idiopathic intracranial hypertension (Primary)  -     OCT, Optic Nerve - OU - Both Eyes  -     Santoyo Visual Field - OU - Both Eyes  IIH (idiopathic intracranial hypertension)  -     Referral to Neurology  -     OCT, Optic  "Nerve - OU - Both Eyes  -     Santoyo Visual Field - OU - Both Eyes  -     topiramate (Topamax) 25 mg tablet; Take 2 tablets (50 mg) by mouth 2 times a day. Start 25 mg evening first week. Then 25 mg twice a day second week. Then 25 mg morning and 50 mg evening third week. Then 50 mg twice a day.  Visual disturbance  -     Referral to Neurology  -     OCT, Optic Nerve - OU - Both Eyes  -     Santoyo Visual Field - OU - Both Eyes    Assessment and Plan    04/25/2024 MRI brain without contrast, by report from [facility], shows \"1. Radiographic findings suspicious for intracranial hypertension with   various findings as above. Correlation is recommended with the patient's   symptomatology and clinical presentation. If there are clinical findings   suggesting the potential diagnosis of intracranial hypertension then   follow-up with a lumbar puncture with pressure measurement would be   suggested.   2. No diffusion restriction to suggest acute ischemia or infarct. There   is no mass effect or shift of the midline. \"  04/11/2024 CT venogram head, by report from [facility], shows \"1. Normal brain CT venogram. Dural sinuses and deep venous drainage is   patent.\"    05/13/2024 lumbar puncture opening pressure 23 cm water PRONE, tube 1: RBC 47, WBC 1, tube 4: RBC 0, WBC 0, protein 24.9 & glucose 60. IgG studies wnl. Oligoclonal bands 0. VDRL non-reactive (NR).    03/21/2025 B12 672.    04/07/2025 OCT RNFL  & .    04/07/2025 HVF 24-2 OD fovea 37, possible superior nasal step MD -0.91 & OS fovea 38, wnl MD -0.03.    This 40 year-old woman with a history of idiopathic intracranial hypertension, obesity, chronic migraine presents for evaluation of idiopathic intracranial hypertension.    She has been diagnosed with idiopathic intracranial hypertension with a reasonable evaluation although somewhat different that my usual procedure. She has minimal papilledema and low risk of vision loss although the right Santoyo visual " field (HVF) with a nasal step could indicate a prior problem. We discussed whether to repeat imaging versus treating for now. We also discussed that acetazolamide is lower than usual dosing, but as before, reasonable with low optic disc edema.    Plan    Stop acetazolamide secondary to side effects.  Start topiramate and increase from 25 mg daily to 50 mg BID over 1 month.    Follow up in 3 months with HVF & OCT. (Dilated 4/7/2025)      Below you will find my full examination. I appreciate the opportunity to see Leticia Velasquez today and to share in her care with you. Please contact me if you have questions for me regarding this visit or if I can be of assistance to another of your patients with neuro-ophthalmological problems.    Sincerely,    Ramone Champion MD PhD    CC:   No Recipients      Base Eye Exam       Visual Acuity (Snellen - Linear)         Right Left    Dist sc 20/20 20/20              Tonometry (Goldmann Applanation, 1:53 PM)         Right Left    Pressure 17 17              Pupils         Pupils Dark Light Shape React APD    Right PERRL, No APD 5 3 Round Brisk None    Left PERRL, No APD 5 3 Round Brisk None              Visual Fields         Left Right     Full Full              Extraocular Movement         Right Left     Full, Ortho Full, Ortho              Neuro/Psych       Oriented x3: Yes    Mood/Affect: Normal              Dilation       Both eyes: 1% Mydriacyl & 2.5% Tyler  @ 3:10 PM                  Additional Tests       Color         Right Left    Ishihara 11/11 11/11              Stereo       Fly: +    Animals: 3/3    Circles: 8/9                  Slit Lamp and Fundus Exam       External Exam         Right Left    External Normal Normal              Slit Lamp Exam         Right Left    Lids/Lashes Normal Normal    Conjunctiva/Sclera White and quiet White and quiet    Cornea Clear Clear    Anterior Chamber Deep and quiet Deep and quiet    Iris Round and reactive Round and reactive    Lens  Clear Clear    Anterior Vitreous Normal Normal              Fundus Exam         Right Left    Disc Normal trace edema    C/D Ratio 0.2 0.2    Macula Normal Normal    Vessels Normal Normal    Periphery Normal Normal

## 2025-04-07 NOTE — PROGRESS NOTES
"Assessment and Plan    04/25/2024 MRI brain without contrast, by report from [facility], shows \"1. Radiographic findings suspicious for intracranial hypertension with   various findings as above. Correlation is recommended with the patient's   symptomatology and clinical presentation. If there are clinical findings   suggesting the potential diagnosis of intracranial hypertension then   follow-up with a lumbar puncture with pressure measurement would be   suggested.   2. No diffusion restriction to suggest acute ischemia or infarct. There   is no mass effect or shift of the midline. \"  04/11/2024 CT venogram head, by report from [facility], shows \"1. Normal brain CT venogram. Dural sinuses and deep venous drainage is   patent.\"    05/13/2024 lumbar puncture opening pressure 23 cm water PRONE, tube 1: RBC 47, WBC 1, tube 4: RBC 0, WBC 0, protein 24.9 & glucose 60. IgG studies wnl. Oligoclonal bands 0. VDRL non-reactive (NR).    03/21/2025 B12 672.    04/07/2025 OCT RNFL  & .    04/07/2025 HVF 24-2 OD fovea 37, possible superior nasal step MD -0.91 & OS fovea 38, wnl MD -0.03.    This 40 year-old woman with a history of idiopathic intracranial hypertension, obesity, chronic migraine presents for evaluation of idiopathic intracranial hypertension.    She has been diagnosed with idiopathic intracranial hypertension with a reasonable evaluation although somewhat different that my usual procedure. She has minimal papilledema and low risk of vision loss although the right Santoyo visual field (HVF) with a nasal step could indicate a prior problem. We discussed whether to repeat imaging versus treating for now. We also discussed that acetazolamide is lower than usual dosing, but as before, reasonable with low optic disc edema.    Plan    Stop acetazolamide secondary to side effects.  Start topiramate and increase from 25 mg daily to 50 mg BID over 1 month.    Follow up in 3 months with HVF & OCT. (Dilated 4/7/2025)  "

## 2025-04-11 ENCOUNTER — OFFICE VISIT (OUTPATIENT)
Dept: NEUROLOGY | Facility: CLINIC | Age: 40
End: 2025-04-11
Payer: COMMERCIAL

## 2025-04-11 VITALS — DIASTOLIC BLOOD PRESSURE: 78 MMHG | HEART RATE: 72 BPM | SYSTOLIC BLOOD PRESSURE: 120 MMHG | HEIGHT: 61 IN

## 2025-04-11 DIAGNOSIS — R27.9 LACK OF COORDINATION: ICD-10-CM

## 2025-04-11 DIAGNOSIS — R26.89 BALANCE PROBLEM: ICD-10-CM

## 2025-04-11 DIAGNOSIS — H53.9 VISUAL DISTURBANCE: ICD-10-CM

## 2025-04-11 DIAGNOSIS — R11.0 NAUSEA: ICD-10-CM

## 2025-04-11 DIAGNOSIS — G93.2 IIH (IDIOPATHIC INTRACRANIAL HYPERTENSION): Primary | ICD-10-CM

## 2025-04-11 DIAGNOSIS — G43.E01 CHRONIC MIGRAINE WITH AURA AND WITH STATUS MIGRAINOSUS, NOT INTRACTABLE: ICD-10-CM

## 2025-04-11 DIAGNOSIS — R42 VERTIGO: ICD-10-CM

## 2025-04-11 PROCEDURE — G2212 PROLONG OUTPT/OFFICE VIS: HCPCS

## 2025-04-11 PROCEDURE — 99215 OFFICE O/P EST HI 40 MIN: CPT

## 2025-04-11 ASSESSMENT — PATIENT HEALTH QUESTIONNAIRE - PHQ9
1. LITTLE INTEREST OR PLEASURE IN DOING THINGS: SEVERAL DAYS
10. IF YOU CHECKED OFF ANY PROBLEMS, HOW DIFFICULT HAVE THESE PROBLEMS MADE IT FOR YOU TO DO YOUR WORK, TAKE CARE OF THINGS AT HOME, OR GET ALONG WITH OTHER PEOPLE: SOMEWHAT DIFFICULT
2. FEELING DOWN, DEPRESSED OR HOPELESS: NOT AT ALL
SUM OF ALL RESPONSES TO PHQ9 QUESTIONS 1 AND 2: 1

## 2025-04-11 ASSESSMENT — PAIN SCALES - GENERAL: PAINLEVEL_OUTOF10: 0-NO PAIN

## 2025-04-11 NOTE — PATIENT INSTRUCTIONS
PLAN:  Stop terbinafine (ask PCP for guidance)  Stop acetazolamide in 1 week  Start topiramate in 1 week (taper up)  25 mg PM  Up to 50 mg PM  25 mg AM and 50 mg PM  50 mg AM and 50 mg PM  Follow up with Dr. Norwood 7/71/25  Will consider repeat MRI  Schedule Vestibular Therapy  If you need assistance scheduling, please call the [facility] : The  phone number is [phone].   Consider Vision Development Team for evaluation ([email or web address])

## 2025-04-11 NOTE — PROGRESS NOTES
"Chief Complaint   Patient presents with    Migraine     Virtual or Telephone Consent  An interactive audio and video telecommunication system which permits real time communications between the patient (at the originating site) and provider (at the distant site) was utilized to provide this telehealth service.     Verbal consent was requested and obtained from Leticia Velasquez on this date, 03/11/25 for a telehealth visit and the patient's location was confirmed at the time of the visit.    Subjective   HPI  Leticia Velasquez is a 40 y.o. year old female who presents for a virtual follow-up of No primary diagnosis found., chronic migraine with aura, visual disturbance, and nausea. PMH significant for allergic rhinitis, asthma, anxiety/depression, headaches, PCOS, weight gain, and repeat candida infections. She is a 10 pack-year smoker, quit 12 years ago.   *Switched to a new PCP- Mitali Ly KT/ [facility].  Head pressure symptoms occurring several days per week.    Leticia  states that she is experiencing a HA in the morning, over right ear 3 days per week. (Previous visit, had about 4-5 migraines, one severe, lasting 3 days).  Also states she is \"smelling weird smells\"and experiencing twitching of right eye, HA over the top of the right eye- pressure; can't even touch the outside. Pain level 7/10; intermittent, but increasing.  Started acetazolamide on 1/17/25; (side effect of nausea in the morning; tingling in the fingertips/ dropping paint brushes).  125 mg at night- 9 PM. If she takes it earlier, the tingling is still present  Triggers include  barometric pressure, wind, bending forward/ backwards. Aura sees stars in visual field prior to the onset of headaches.   Daily symptoms of confusion upon awakening, cold sweats/ nausea followed by a headache, sensitive ears when outside in the wind. Causes pain behind the right eye.   The headaches are usually supraorbital \"behind the bone\" always on the right side " intense pressure. No excess lacrimation or rhinorrhea associated with it. She also endorses heaviness in the back of the neck/ sore muscle.   Headaches are worsened with exertion. Bending over and putting head back makes it worse- not instantly but within a few hours.   Associated nausea, photophobia, and phonophobia, cognitive fog, fatigue.   Treats with Rizatriptan- has only taken it once and did not feel a difference. Took it about the first of January. States she just went home and went to bed and slept for about 8 hours. Unsure if the HA made her sleep or the medication.   Work attendance or other daily activities affected: still difficult to complete work. (She is a ); bilateral tinnitus with painting- will experience ringing in the ears if she leans to the side.    The patient denies the presence of any associated double vision, speech problems, confusion, facial or problems with coordination. Denies other neurological history of seizures, stroke, or TIA.    Current/ past HA treatments:  Preventive:  Acetazolamide   No history of Topamax use, no hx of kidney stones     Rescue:  rizatriptan  No steroid burst  Ibuprofen  Tylenol  heat      Current Outpatient Medications:     busPIRone (Buspar) 10 mg tablet, Take 0.5-1 tablets (5-10 mg) by mouth if needed., Disp: , Rfl:     citalopram (CeleXA) 20 mg tablet, Take 1 tablet (20 mg) by mouth once daily., Disp: , Rfl:     loratadine (Claritin) 10 mg tablet, Take 1 tablet (10 mg) by mouth once daily., Disp: , Rfl:     ondansetron (Zofran) 4 mg tablet, Take 1 tablet (4 mg) by mouth every 8 hours if needed for nausea or vomiting for up to 90 doses., Disp: 30 tablet, Rfl: 2    rizatriptan (Maxalt) 10 mg tablet, Take 1 tablet (10 mg) by mouth 1 time if needed for migraine (May repeat in 2 hours if unresolved. Do not exceed 30 mg in 24 hours.) for up to 27 doses., Disp: 9 tablet, Rfl: 2    terbinafine (LamISIL) 250 mg tablet, Take 1 tablet (250 mg) by mouth once  daily., Disp: , Rfl:     topiramate (Topamax) 25 mg tablet, Take 2 tablets (50 mg) by mouth 2 times a day. Start 25 mg evening first week. Then 25 mg twice a day second week. Then 25 mg morning and 50 mg evening third week. Then 50 mg twice a day. (Patient not taking: Reported on 4/11/2025), Disp: 120 tablet, Rfl: 3    No Known Allergies    ROS  As noted in HPI, otherwise all other systems have been reviewed are negative for complaint.     Objective   General Appearance: Leticia is well-developed, well-nourished, 40 y.o. year old female, in no acute distress. Makes good eye contact, is alert, interactive, and cooperative. Demonstrates recent & remote memory recall. Subjective information consistent with objective assessment. *Assessment limited due to virtual visit.    Neurological Exam  Cranial Nerves  CN III, IV, VI: Extraocular movements intact bilaterally. Normal lids and orbits bilaterally.  CN VII: Full and symmetric facial movement.  CN VIII: Hearing is normal.    RECORDS REVIEW:  Previous records (provider notes, laboratory reports, and imaging studies were reviewed and summarized).  From initial visit: From [place]. Occipital pain and ears- more the right side. MRI done 4/25/24. [facility] in [facility].([facility]to [facility]) CT Veniogram also done 4/11/24. EEG done 4/11/24. CT Sinus w/o contrast 10/2/23. Lumbar Puncture in April. Weight gain in past year.     She states that she had sinus pressure/ headaches for about a year prior to IIH diagnosis in April 2024. An LP was performed 5/13/2024, opening pressure of 23 cm (removal of 25 mL CSF), closing pressure 13 cm. She endorses improvement of symptoms for 3 to 4 months following the LP. She began to have similar symptoms and at a follow-up visit, it was suggested to complete therapeutic LPs when indicated.   Assessment & Plan  IIH (idiopathic intracranial hypertension)    Orders:    Follow Up In Neurology; Future    Referral to Physical Therapy;  Future    Chronic migraine with aura and with status migrainosus, not intractable    Orders:    Follow Up In Neurology; Future    Referral to Physical Therapy; Future    Visual disturbance    Orders:    Follow Up In Neurology; Future    Referral to Physical Therapy; Future    Vertigo    Orders:    Referral to Physical Therapy; Future    Nausea    Orders:    Referral to Physical Therapy; Future    Balance problem    Orders:    Referral to Physical Therapy; Future    Lack of coordination    Orders:    Referral to Physical Therapy; Future         ASSESSMENT/PLAN:  Increase Diamox to twice a day- note what your symptoms are, what side effects you are experiencing, and how they impact you. (Backup plan will be to switch to topiramate)  Please have PCP Check B 12, Vitamin D-25  Ask about adding semaglutide (Ozempic or Wegovy) or trezepetide (Zep Bound)  Keep upcoming appointment with Dr. Champion  I will send visit notes to your new PCP for continuity of care  Follow up in 3 months    Stop terbinafine (ask PCP for guidance)  Stop acetazolamide in 1 week  Start topiramate   25 mg PM  Up to 50 mg PM  25 mg AM and 50 mg PM  50 mg AM and 50 mg PM    Follow up with Dr. Norwood  Schedule Vestibular Therapy  If you need assistance scheduling, please call the [facility] [employer]: The [employer] phone number is [phone].   Consider Vision Development Team for evaluation ([email or web address])      I personally spent  minutes today, exclusive of procedures, providing care for this patient, including preparation, face to face time, documentation and other services such as review of medical records, diagnostic result, patient education, counseling, coordination of care as specified in the encounter.     Mitali Morton, APRN-CNP

## 2025-04-14 ENCOUNTER — HOSPITAL ENCOUNTER (OUTPATIENT)
Dept: RADIOLOGY | Facility: EXTERNAL LOCATION | Age: 40
Discharge: HOME | End: 2025-04-14
Payer: COMMERCIAL

## 2025-04-18 ENCOUNTER — APPOINTMENT (OUTPATIENT)
Dept: OPHTHALMOLOGY | Facility: CLINIC | Age: 40
End: 2025-04-18
Payer: COMMERCIAL

## 2025-04-29 DIAGNOSIS — F32.A ANXIETY AND DEPRESSION: ICD-10-CM

## 2025-04-29 DIAGNOSIS — E66.812 CLASS 2 OBESITY DUE TO EXCESS CALORIES WITHOUT SERIOUS COMORBIDITY WITH BODY MASS INDEX (BMI) OF 37.0 TO 37.9 IN ADULT: ICD-10-CM

## 2025-04-29 DIAGNOSIS — E66.09 CLASS 2 OBESITY DUE TO EXCESS CALORIES WITHOUT SERIOUS COMORBIDITY WITH BODY MASS INDEX (BMI) OF 37.0 TO 37.9 IN ADULT: ICD-10-CM

## 2025-04-29 DIAGNOSIS — F41.9 ANXIETY AND DEPRESSION: ICD-10-CM

## 2025-04-29 DIAGNOSIS — J45.909 ASTHMA DUE TO SEASONAL ALLERGIES: ICD-10-CM

## 2025-04-29 RX ORDER — BUSPIRONE HYDROCHLORIDE 10 MG/1
5-10 TABLET ORAL DAILY PRN
Qty: 60 TABLET | Refills: 2 | OUTPATIENT
Start: 2025-04-29

## 2025-04-29 RX ORDER — PHENTERMINE HYDROCHLORIDE 37.5 MG/1
37.5 CAPSULE ORAL EVERY MORNING
Qty: 30 CAPSULE | Refills: 0 | Status: SHIPPED | OUTPATIENT
Start: 2025-04-29

## 2025-04-29 RX ORDER — LORATADINE 10 MG/1
10 TABLET ORAL DAILY
Qty: 30 TABLET | Refills: 5 | OUTPATIENT
Start: 2025-04-29

## 2025-04-29 RX ORDER — PHENTERMINE HYDROCHLORIDE 37.5 MG/1
37.5 CAPSULE ORAL EVERY MORNING
Qty: 30 CAPSULE | Refills: 0 | OUTPATIENT
Start: 2025-04-29

## 2025-04-29 RX ORDER — CITALOPRAM HYDROBROMIDE 20 MG/1
20 TABLET ORAL DAILY
Qty: 90 TABLET | Refills: 0 | Status: SHIPPED | OUTPATIENT
Start: 2025-04-29

## 2025-04-29 NOTE — TELEPHONE ENCOUNTER
Rx Refill Note  Requested Prescriptions     Pending Prescriptions Disp Refills    loratadine (CLARITIN) 10 MG tablet 30 tablet 5     Sig: Take 1 tablet by mouth Daily.    busPIRone (BUSPAR) 10 MG tablet 60 tablet 2     Sig: Take 0.5-1 tablets by mouth Daily As Needed (anxiety).      Last office visit with prescribing clinician: 11/5/2024   Last telemedicine visit with prescribing clinician: Visit date not found   Next office visit with prescribing clinician: Visit date not found                         Would you like a call back once the refill request has been completed: [] Yes [] No    If the office needs to give you a call back, can they leave a voicemail: [] Yes [] No    Davie Sandoval MA  04/29/25, 10:25 CDT

## 2025-06-09 DIAGNOSIS — E66.812 CLASS 2 OBESITY DUE TO EXCESS CALORIES WITHOUT SERIOUS COMORBIDITY WITH BODY MASS INDEX (BMI) OF 37.0 TO 37.9 IN ADULT: ICD-10-CM

## 2025-06-09 DIAGNOSIS — E66.09 CLASS 2 OBESITY DUE TO EXCESS CALORIES WITHOUT SERIOUS COMORBIDITY WITH BODY MASS INDEX (BMI) OF 37.0 TO 37.9 IN ADULT: ICD-10-CM

## 2025-06-09 DIAGNOSIS — F41.9 ANXIETY AND DEPRESSION: ICD-10-CM

## 2025-06-09 DIAGNOSIS — E55.9 VITAMIN D DEFICIENCY: ICD-10-CM

## 2025-06-09 DIAGNOSIS — J45.909 ASTHMA DUE TO SEASONAL ALLERGIES: ICD-10-CM

## 2025-06-09 DIAGNOSIS — B37.2 YEAST DERMATITIS: ICD-10-CM

## 2025-06-09 DIAGNOSIS — F32.A ANXIETY AND DEPRESSION: ICD-10-CM

## 2025-06-09 RX ORDER — BUSPIRONE HYDROCHLORIDE 10 MG/1
5-10 TABLET ORAL DAILY PRN
Qty: 60 TABLET | Refills: 2 | OUTPATIENT
Start: 2025-06-09

## 2025-06-09 RX ORDER — NYSTATIN 100000 U/G
1 CREAM TOPICAL 3 TIMES DAILY
Qty: 30 G | Refills: 3 | OUTPATIENT
Start: 2025-06-09

## 2025-06-09 RX ORDER — ERGOCALCIFEROL 1.25 MG/1
50000 CAPSULE, LIQUID FILLED ORAL WEEKLY
Qty: 5 CAPSULE | Refills: 6 | Status: SHIPPED | OUTPATIENT
Start: 2025-06-09

## 2025-06-09 RX ORDER — NYSTATIN 100000 [USP'U]/G
POWDER TOPICAL 4 TIMES DAILY
Qty: 30 G | Refills: 3 | OUTPATIENT
Start: 2025-06-09

## 2025-06-09 RX ORDER — ACETAZOLAMIDE 125 MG/1
125 TABLET ORAL EVERY 12 HOURS SCHEDULED
Qty: 60 TABLET | Refills: 11 | Status: SHIPPED | OUTPATIENT
Start: 2025-06-09

## 2025-06-09 RX ORDER — LORATADINE 10 MG/1
10 TABLET ORAL DAILY
Qty: 30 TABLET | Refills: 5 | OUTPATIENT
Start: 2025-06-09

## 2025-06-09 RX ORDER — PHENTERMINE HYDROCHLORIDE 37.5 MG/1
37.5 CAPSULE ORAL EVERY MORNING
Qty: 30 CAPSULE | Refills: 0 | Status: SHIPPED | OUTPATIENT
Start: 2025-06-09

## 2025-06-09 RX ORDER — FLUTICASONE PROPIONATE 50 MCG
2 SPRAY, SUSPENSION (ML) NASAL DAILY
Qty: 16 G | Refills: 3 | Status: SHIPPED | OUTPATIENT
Start: 2025-06-09

## 2025-06-10 ENCOUNTER — TELEPHONE (OUTPATIENT)
Dept: INTERNAL MEDICINE | Facility: CLINIC | Age: 40
End: 2025-06-10
Payer: MEDICAID

## 2025-06-10 NOTE — TELEPHONE ENCOUNTER
Caller:     Kristen Christie        Relationship  [facility]     Callback number:    [phone]       Is it ok to leave a message: [x] Yes [x] No    Requested medication for samples:   phentermine 37.5 MG capsule  37.5 mg, Every Morning   ACETAZOLAMIDE     How much medication does the patient currently have left: NONE     Who will be picking up the samples: SELF     Do you need information about patient financial assistance for this medication: [x] Yes [] No    Additional details provided: SHE STATES SHE IS REPLYING FOR STATE MEDICAID SHE DOES NOT HAVE INSURANCE AT THIS TIME

## 2025-06-10 NOTE — TELEPHONE ENCOUNTER
Relay    Left message with patient to return call. We do not have sample of medication requested. Insurance information may need to be changed at the pharmacy. If patient is self pay, she may have to pay for medication out of pocket.

## 2025-06-16 ENCOUNTER — APPOINTMENT (OUTPATIENT)
Dept: NEUROLOGY | Facility: CLINIC | Age: 40
End: 2025-06-16
Payer: COMMERCIAL

## 2025-07-21 ENCOUNTER — APPOINTMENT (OUTPATIENT)
Dept: OPHTHALMOLOGY | Facility: CLINIC | Age: 40
End: 2025-07-21
Payer: COMMERCIAL

## 2025-07-21 ENCOUNTER — APPOINTMENT (OUTPATIENT)
Dept: NEUROLOGY | Facility: CLINIC | Age: 40
End: 2025-07-21
Payer: COMMERCIAL

## 2025-08-12 DIAGNOSIS — G93.2 PSEUDOTUMOR CEREBRI: Primary | ICD-10-CM

## 2025-08-12 RX ORDER — ACETAZOLAMIDE 250 MG/1
500 TABLET ORAL 2 TIMES DAILY
Qty: 120 TABLET | Refills: 11 | Status: SHIPPED | OUTPATIENT
Start: 2025-08-12

## 2025-08-14 DIAGNOSIS — E66.812 CLASS 2 OBESITY DUE TO EXCESS CALORIES WITHOUT SERIOUS COMORBIDITY WITH BODY MASS INDEX (BMI) OF 37.0 TO 37.9 IN ADULT: ICD-10-CM

## 2025-08-14 DIAGNOSIS — E66.09 CLASS 2 OBESITY DUE TO EXCESS CALORIES WITHOUT SERIOUS COMORBIDITY WITH BODY MASS INDEX (BMI) OF 37.0 TO 37.9 IN ADULT: ICD-10-CM

## 2025-08-14 RX ORDER — PHENTERMINE HYDROCHLORIDE 37.5 MG/1
37.5 CAPSULE ORAL EVERY MORNING
Qty: 30 CAPSULE | Refills: 0 | Status: SHIPPED | OUTPATIENT
Start: 2025-08-14